# Patient Record
Sex: FEMALE | Race: WHITE | NOT HISPANIC OR LATINO | URBAN - METROPOLITAN AREA
[De-identification: names, ages, dates, MRNs, and addresses within clinical notes are randomized per-mention and may not be internally consistent; named-entity substitution may affect disease eponyms.]

---

## 2016-12-29 NOTE — ED PROVIDER NOTE - OBJECTIVE STATEMENT
89 y/o F with h/o chronic neck and back pain c/o same.  States she slept in a recliner last night and this exacerbated her chronic pain.  Denies injury/trauma.  Denies fever, bowel/bladder incontinence, extremity weakness , or any other complaints. 89 y/o F with h/o chronic neck and back pain c/o same.  States she slept in a recliner last night and this exacerbated her chronic pain.  Denies injury/trauma.  Denies fever, bowel/bladder incontinence, extremity weakness , or any other complaints.  Pt followed by pmd and pain mgmt for her chronic pain.  States this is her typical chronic neck and back pain.  No change.

## 2016-12-29 NOTE — ED PROVIDER NOTE - CARE PLAN
Principal Discharge DX:	Chronic back pain, unspecified back location, unspecified back pain laterality

## 2016-12-29 NOTE — ED ADULT NURSE NOTE - PMH
CAD (coronary artery disease)    Cataract  right eye  Chronic back pain    DVT (deep venous thrombosis), right    Hypothyroid

## 2016-12-29 NOTE — ED PROVIDER NOTE - CHPI ED SYMPTOMS NEG
no constipation/no bowel dysfunction/no motor function loss/no bladder dysfunction/no neck tenderness/no numbness/no tingling

## 2016-12-29 NOTE — ED PROVIDER NOTE - PROGRESS NOTE DETAILS
PT found to have rectal temp of 100.4.  PT denies fever/chills, SOB, cough, urinary symptoms, or any other infectious complaints.  Only c/o chronic neck and back pain. PT found to have rectal temp of 100.4.  PT denies fever/chills, SOB, cough, urinary symptoms, or any other infectious complaints.  Only c/o chronic neck and back pain.  CXR with chronic L  basilar effusion. Pt resting comfortably. No distress.  Feels better.  Family at bedside not sure if she can go home yet.  May need STR.   Will consult PT and SW. Seen and evaluated by PT. Not stable to go home. Will require 3 day in patient hospitalization. PMD=Dilip. Will contact  for admission Seen and evaluated by PT. Not stable to go home. Will require 3 day in patient hospitalization. PMD=Dilip. Will contact  for admission. Awaiting call back

## 2016-12-29 NOTE — ED ADULT NURSE NOTE - OBJECTIVE STATEMENT
pt states she has chronic pain and last night slept in the recliner and was unable to stand today. pt sees pain management  presents with complaints of pain to neck, and lower back. pt moaning and yells in pain with movement.

## 2016-12-30 NOTE — H&P ADULT. - PROBLEM SELECTOR PLAN 1
Will continue patient on Oxycodone for pain as needed.  Will get neurology to see patient.   Further management as per patient's clinical course

## 2016-12-30 NOTE — H&P ADULT. - PROBLEM SELECTOR PLAN 2
Will continue patient on amlodipine, Diovan and metoprolol with holding parameters.  Monitor blood pressure per protocol.

## 2016-12-30 NOTE — H&P ADULT. - PROBLEM SELECTOR PLAN 8
Source of leukocytosis and low-grade temperature not clear.  Will order one dose of Levaquin.  We will request infectious disease consultation.  Follow culture data.

## 2016-12-30 NOTE — PHYSICAL THERAPY INITIAL EVALUATION ADULT - GAIT DEVIATIONS NOTED, PT EVAL
decreased weight-shifting ability/decreased yuval/decreased velocity of limb motion/decreased stride length/decreased step length

## 2016-12-30 NOTE — H&P ADULT. - NEUROLOGICAL COMMENTS
Patient is awake and alert.  She is complaining of pain all over the body.  She denies any weakness in legs or arms.

## 2016-12-30 NOTE — PHYSICAL THERAPY INITIAL EVALUATION ADULT - RANGE OF MOTION EXAMINATION, REHAB EVAL
deficits as listed below/however limited cervical spine rotation, multiple arthritic changes and limitations to end range due to pain and musculoskeletal history./bilateral upper extremity ROM was WFL (within functional limits)/bilateral lower extremity ROM was WFL (within functional limits)

## 2016-12-30 NOTE — PHYSICAL THERAPY INITIAL EVALUATION ADULT - FOLLOWS COMMANDS/ANSWERS QUESTIONS, REHAB EVAL
very hesitant, anxious and not willing to allow PT to assist her. Pt was getting annoyed and stating "let me do it". Pt with confusion and poor safety awareness/75% of the time/able to follow single-step instructions

## 2016-12-30 NOTE — ED ADULT NURSE REASSESSMENT NOTE - COMFORT CARE
warm blanket provided/side rails up/plan of care explained/darkened lights/repositioned/wait time explained

## 2016-12-30 NOTE — H&P ADULT. - ASSESSMENT
89-year-old female with  history of CAD, stent placement, hypertension, dyslipidemia, hypothyroidism, history of cataracts and DVT in the past, who has been having increasing back pain and neck pain.  Patient was brought into the emergency room last night due to increasing back and neck pain and inability to walk.  She was seen by physical therapy today.  She is recommended subacute rehabilitation.  Patient is being admitted to the hospital for 3 nights today as per ER physician and physical therapy. Patient is being admitted for same. She is also noted to have low grade temp and mild leukocytosis. Chest x-ray is questionable for pneumonia.

## 2016-12-30 NOTE — PHYSICAL THERAPY INITIAL EVALUATION ADULT - ADDITIONAL COMMENTS
History obtained from pt but is noted to have confusion. As per pt she lives with her son in a private home (son works during daytime). Pt has 3 MARIO and handrails. Pt did not specify about stairs indoors. Pt reports she ambulates with a rolling walker, also owns a single axis cane. Pt does not drive. Pt reports being independent /c functional mobility and ADLs. Pt has a electric recliner in which she does not usually sleep in but slept in after seeing her doctor this week. Now came to ED due to back pain.

## 2016-12-30 NOTE — PATIENT PROFILE ADULT. - ABILITY TO HEAR (WITH HEARING AID OR HEARING APPLIANCE IF NORMALLY USED):
Mildly to Moderately Impaired: difficulty hearing in some environments or speaker may need to increase volume or speak distinctly

## 2016-12-30 NOTE — PHYSICAL THERAPY INITIAL EVALUATION ADULT - IMPAIRMENTS CONTRIBUTING TO GAIT DEVIATIONS, PT EVAL
cognition/decreased ROM/impaired balance/decreased flexibility/decreased strength/impaired coordination/pain/impaired postural control/narrow base of support

## 2016-12-30 NOTE — PHYSICAL THERAPY INITIAL EVALUATION ADULT - PERTINENT HX OF CURRENT PROBLEM, REHAB EVAL
As per ED note: "89 y/o F with h/o chronic neck and back pain c/o same.  States she slept in a recliner last night and this exacerbated her chronic pain.  Denies injury/trauma.  Denies fever, bowel/bladder incontinence, extremity weakness , or any other complaints.  Pt followed by pmd and pain mgmt for her chronic pain.  States this is her typical chronic neck and back pain.  No change". Pt has history of musculoskeletal issues of cervical spondylosis and chronic back pain.

## 2016-12-30 NOTE — PHYSICAL THERAPY INITIAL EVALUATION ADULT - ANKLE STRATEGY ASSESSMENT, REHAB EVAL
bpm when initially back into bed, resolved to ~98 bpm in < 10 seconds. NAD. Dr. Ascencio and CATRACHO Arzola aware.

## 2016-12-30 NOTE — PHYSICAL THERAPY INITIAL EVALUATION ADULT - PHYSICAL ASSIST/NONPHYSICAL ASSIST: SUPINE/SIT, REHAB EVAL
1 person assist/verbal cues/pt grabbing for bedrail instead of following PT cues for ease of transfers/nonverbal cues (demo/gestures)/set-up required

## 2016-12-30 NOTE — PHYSICAL THERAPY INITIAL EVALUATION ADULT - TRANSFER SAFETY CONCERNS NOTED: SIT/STAND, REHAB EVAL
decreased balance during turns/decreased weight-shifting ability/decreased sequencing ability/decreased safety awareness

## 2016-12-30 NOTE — PHYSICAL THERAPY INITIAL EVALUATION ADULT - PHYSICAL ASSIST/NONPHYSICAL ASSIST: SIT/SUPINE, REHAB EVAL
verbal cues/set-up required/nonverbal cues (demo/gestures)/pt grabbing for bedrail instead of following PT cues for ease of transfers/1 person assist

## 2016-12-30 NOTE — PHYSICAL THERAPY INITIAL EVALUATION ADULT - PLANNED THERAPY INTERVENTIONS, PT EVAL
balance training/Addendum: G-code information.  The patient's primary goal was to improve mobility and ambulation.  Based on the subjective reports and objective findings the primary functional issue is related to G code 8978 CK modifier. Because the patient will be followed in another setting, the goal and discharge status is as follows:  CK for goal,  CK for discharge. PT recc KUN needs./bed mobility training/transfer training/gait training

## 2016-12-30 NOTE — PHYSICAL THERAPY INITIAL EVALUATION ADULT - IMPAIRED TRANSFERS: SIT/STAND, REHAB EVAL
impaired balance/decreased flexibility/decreased ROM/impaired postural control/cognition/narrow base of support/pain/decreased strength/impaired coordination

## 2016-12-30 NOTE — H&P ADULT. - BACK COMMENTS
Patient complains of back pain on minimal palpation although no deformities are noted except for straightening of the spine.  Patient  is unable to sit up straight due to pain.  She also is unable to turn on her own.

## 2016-12-30 NOTE — PHYSICAL THERAPY INITIAL EVALUATION ADULT - GENERAL OBSERVATIONS, REHAB EVAL
Pt received in ED on stretcher /c BP signals beeping. BP re-taken 148/48. Pt reporting she feels cold and in pain to right heel initially. Pt noted to be confused as well. A&Ox2-3 but very anxious and sensitive to touch. NAD. CATRACHO Arzola and Dr. Ascencio aware. Pt hesitant and apprehension to get up and move but was agreeable /c PT eval.

## 2016-12-30 NOTE — H&P ADULT. - RS GEN PE MLT RESP DETAILS PC
no intercostal retractions/diminished breath sounds, L/no rales/airway patent/no chest wall tenderness/no subcutaneous emphysema/diminished breath sounds, R

## 2016-12-30 NOTE — PHYSICAL THERAPY INITIAL EVALUATION ADULT - IMPAIRMENTS FOUND, PT EVAL
gross motor/ROM/posture/pain, stairs/aerobic capacity/endurance/muscle strength/poor safety awareness/cognitive impairment/fine motor/gait, locomotion, and balance/arousal, attention, and cognition

## 2016-12-30 NOTE — H&P ADULT. - PMH
CAD (coronary artery disease)    Cataract  right eye  Chronic back pain    DVT (deep venous thrombosis), right    Essential hypertension    Hyperlipidemia, unspecified hyperlipidemia type    Hypothyroid

## 2016-12-30 NOTE — H&P ADULT. - HISTORY OF PRESENT ILLNESS
89-year-old female who has past medical history of CAD, stent placement, hypertension, dyslipidemia, hypothyroidism, history of cataracts and DVT in the past, who has been having increasing back pain and neck pain.  The patient follows with pain management physician on regular basis.      Patient apparently slept in a recliner day before.  She was unable to stand or walk next day due to pain.  The patient was brought into the emergency room last night.  She was seen by physical therapy today.  She is recommended subacute rehabilitation.  Patient is being admitted to the hospital for 3 nights today as per ER physician and physical therapy.    Patient was also noted to have low-grade fever in the emergency room 100.4.  Chest x-ray shows pleural effusion which are chronic.    At the time my evaluation patient denies any chest pain or chest pressure.  She continues complaining of neck pain and back pain.  Denies any nausea or vomiting denies any fever or chills.  Denies any dizziness or lightheadedness.  Denies any syncope.  The patient denies any bowel or bladder control issues.  She denies any neurological deficit other than inability to stand and walk at this time secondary to pain.

## 2016-12-30 NOTE — ED ADULT NURSE REASSESSMENT NOTE - NS ED NURSE REASSESS COMMENT FT1
patient c/o bladder pressure, bladder scan done, shows >678. Kumar cath placed with 675 mL residual urine drained with relief of pressure. Dr Latham aware.
patient resting during night comfortably in no distress, with no complaints. breathing easily, no pain, moving all extremities.
89 year old female received from Night RN. Pt presented to the ED with back pain. Pt at baseline mental status- confused A+O x 2. disoriented to time. Pt afib on the monitor. Pt denies chest pain. Respirations even and unlabored. Pt unwilling / unable to ambulate with assistance at this time. Pt c/o back bone pain . pain scale 8. Limited movement bilateral upper and lower extremities. Dumont in place to r/o urinary retention. 75 mL in dumont bag. Urine light yellow and clear. no abdominal or suprapubic pain/distention noted.

## 2017-01-02 NOTE — DISCHARGE NOTE ADULT - PROVIDER TOKENS
FREE:[LAST:[angela],FIRST:[tom],PHONE:[(   )    -],FAX:[(   )    -],ADDRESS:[[facility]]],TOKEN:'[id number]',TOKEN:'[id number]'

## 2017-01-02 NOTE — DISCHARGE NOTE ADULT - MEDICATION SUMMARY - MEDICATIONS TO STOP TAKING
I will STOP taking the medications listed below when I get home from the hospital:    hydrocodone-ibuprofen 7.5 mg-200 mg oral tablet  -- 1 tab(s) by mouth every 4 hours, As Needed

## 2017-01-02 NOTE — DISCHARGE NOTE ADULT - SECONDARY DIAGNOSIS.
CAD (coronary artery disease) Essential hypertension Hyperlipidemia, unspecified hyperlipidemia type Hypothyroid

## 2017-01-02 NOTE — DISCHARGE NOTE ADULT - MEDICATION SUMMARY - MEDICATIONS TO TAKE
I will START or STAY ON the medications listed below when I get home from the hospital:    Medrol Dosepak 4 mg oral tablet  -- 1 packet(s) by mouth once take as directed  -- Indication: For =    traMADol 50 mg oral tablet  -- 1 tab(s) by mouth 4 times a day, As needed, pain  -- Indication: For =    aspirin 81 mg oral tablet  -- 1 tab(s) by mouth once a day  -- Indication: For =    Diovan 80 mg oral tablet  -- 1 tab(s) by mouth once a day  -- Indication: For =    tamsulosin 0.4 mg oral capsule  -- 1 cap(s) by mouth once a day (at bedtime)  -- Indication: For =    heparin  -- 5000 unit(s) subcutaneous 3 times a day  -- Indication: For =    Zocor 40 mg oral tablet  -- 1 tab(s) by mouth once a day (at bedtime)  -- Indication: For =    Zetia 10 mg oral tablet  -- 1 tab(s) by mouth once a day  -- Indication: For =    QUEtiapine  -- 12.5 milligram(s) by mouth once a day (at bedtime)  -- Indication: For =    Xanax 0.25 mg oral tablet  -- 1 tab(s) by mouth 3 times a day, As Needed  -- Indication: For =    metoprolol tartrate 50 mg oral tablet  -- 1 tab(s) by mouth 2 times a day  -- Indication: For =    Fosamax  -- 35  by mouth once a day  -- Indication: For =    amLODIPine 10 mg oral tablet  -- 1 tab(s) by mouth once a day  -- Indication: For =    lidocaine 5% topical film  -- Apply on skin to affected area once a day  -- Indication: For =    docusate sodium 100 mg oral capsule  -- 1 cap(s) by mouth 3 times a day  -- Indication: For =    methocarbamol 750 mg oral tablet  -- 1 tab(s) by mouth 3 times a day  -- Indication: For =    pantoprazole 40 mg oral delayed release tablet  -- 1 tab(s) by mouth once a day (before a meal)  -- Indication: For =    Synthroid 75 mcg (0.075 mg) oral tablet  -- 1 tab(s) by mouth once a day  -- Indication: For = I will START or STAY ON the medications listed below when I get home from the hospital:    Medrol Dosepak 4 mg oral tablet  -- 1 packet(s) by mouth once take as directed  -- Indication: For =    traMADol 50 mg oral tablet  -- 1 tab(s) by mouth 4 times a day, As needed, pain  -- Indication: For =    aspirin 81 mg oral tablet  -- 1 tab(s) by mouth once a day  -- Indication: For =    Diovan 80 mg oral tablet  -- 1 tab(s) by mouth once a day  -- Indication: For =    tamsulosin 0.4 mg oral capsule  -- 1 cap(s) by mouth once a day (at bedtime)  -- Indication: For =    heparin  -- 5000 unit(s) subcutaneous 3 times a day  -- Indication: For =    Zocor 40 mg oral tablet  -- 1 tab(s) by mouth once a day (at bedtime)  -- Indication: For =    Zetia 10 mg oral tablet  -- 1 tab(s) by mouth once a day  -- Indication: For =    QUEtiapine  -- 12.5 milligram(s) by mouth once a day (at bedtime)  -- Indication: For =    Xanax 0.25 mg oral tablet  -- 1 tab(s) by mouth 3 times a day, As Needed  -- Indication: For =    metoprolol tartrate 50 mg oral tablet  -- 1 tab(s) by mouth 2 times a day  -- Indication: For =    Fosamax  -- 35  by mouth once a day  -- Indication: For =    amLODIPine 10 mg oral tablet  -- 1 tab(s) by mouth once a day  -- Indication: For =    lidocaine 5% topical film  -- Apply on skin to affected area once a day  -- Indication: For =    docusate sodium 100 mg oral capsule  -- 1 cap(s) by mouth 3 times a day  -- Indication: For =    methocarbamol 750 mg oral tablet  -- 1 tab(s) by mouth 3 times a day  -- Indication: For =    pantoprazole 40 mg oral delayed release tablet  -- 1 tab(s) by mouth once a day (before a meal)  -- Indication: For =    Synthroid 75 mcg (0.075 mg) oral tablet  -- 1 tab(s) by mouth once a day  -- Indication: For =    nitrofurantoin macrocrystals-monohydrate 100 mg oral capsule  -- 1 cap(s) by mouth 2 times a day (with meals) for 2 days  -- Indication: For =

## 2017-01-02 NOTE — DISCHARGE NOTE ADULT - CARE PROVIDER_API CALL
tom millerA  Phone: (   )    -  Fax: (   )    -    Nuno Marin (MD), Urology  [address]  [place] [address]  Phone: [phone]  Fax: [phone]    Perlman, Craig D (DO), Internal Medicine  [facility]  [place] [address]  Phone: [phone]  Fax: [phone]

## 2017-01-02 NOTE — DISCHARGE NOTE ADULT - HOSPITAL COURSE
admitted for FALL   had a febrile epiose  Doubt active infection  DC after ID and neuro clearance admitted for FALL   had a febrile epiose  Doubt active infection / UTI  DC after ID and neuro clearance

## 2017-01-02 NOTE — DISCHARGE NOTE ADULT - CARE PROVIDERS DIRECT ADDRESSES
,DirectAddress_Unknown,[email or web address],DirectAddress_Unknown,DirectAddress_Unknown

## 2017-01-02 NOTE — DISCHARGE NOTE ADULT - PATIENT PORTAL LINK FT
“You can access the FollowHealth Patient Portal, offered by [facility], by registering with the following website: [email or web address]”

## 2017-01-02 NOTE — DISCHARGE NOTE ADULT - CARE PLAN
Principal Discharge DX:	Chronic back pain, unspecified back location, unspecified back pain laterality  Goal:	free from pain  Instructions for follow-up, activity and diet:	follow up with PMD Dr. CURTIS  Secondary Diagnosis:	CAD (coronary artery disease)  Secondary Diagnosis:	Essential hypertension  Secondary Diagnosis:	Hyperlipidemia, unspecified hyperlipidemia type  Secondary Diagnosis:	Hypothyroid

## 2017-01-14 ENCOUNTER — INPATIENT (INPATIENT)
Facility: HOSPITAL | Age: 82
LOS: 3 days | Discharge: HOME | DRG: 195 | End: 2017-01-18
Attending: INTERNAL MEDICINE | Admitting: INTERNAL MEDICINE
Payer: MEDICARE

## 2017-01-14 VITALS
WEIGHT: 123.02 LBS | TEMPERATURE: 100 F | OXYGEN SATURATION: 97 % | SYSTOLIC BLOOD PRESSURE: 116 MMHG | HEART RATE: 84 BPM | RESPIRATION RATE: 14 BRPM | DIASTOLIC BLOOD PRESSURE: 60 MMHG

## 2017-01-14 DIAGNOSIS — A41.9 SEPSIS, UNSPECIFIED ORGANISM: ICD-10-CM

## 2017-01-14 DIAGNOSIS — Z95.0 PRESENCE OF CARDIAC PACEMAKER: Chronic | ICD-10-CM

## 2017-01-14 LAB
ALBUMIN SERPL ELPH-MCNC: 3 G/DL — LOW (ref 3.3–5)
ALBUMIN SERPL ELPH-MCNC: 3.3 G/DL — SIGNIFICANT CHANGE UP (ref 3.3–5)
ALP SERPL-CCNC: 81 U/L — SIGNIFICANT CHANGE UP (ref 40–120)
ALT FLD-CCNC: 37 U/L — SIGNIFICANT CHANGE UP (ref 12–78)
ANION GAP SERPL CALC-SCNC: 5 MMOL/L — SIGNIFICANT CHANGE UP (ref 5–17)
ANION GAP SERPL CALC-SCNC: 6 MMOL/L — SIGNIFICANT CHANGE UP (ref 5–17)
ANION GAP SERPL CALC-SCNC: 7 MMOL/L — SIGNIFICANT CHANGE UP (ref 5–17)
APPEARANCE UR: CLEAR — SIGNIFICANT CHANGE UP
AST SERPL-CCNC: 18 U/L — SIGNIFICANT CHANGE UP (ref 15–37)
AST SERPL-CCNC: 22 U/L — SIGNIFICANT CHANGE UP (ref 15–37)
BILIRUB SERPL-MCNC: 0.3 MG/DL — SIGNIFICANT CHANGE UP (ref 0.2–1.2)
BILIRUB UR-MCNC: ABNORMAL
BUN SERPL-MCNC: 35 MG/DL — HIGH (ref 7–23)
BUN SERPL-MCNC: 36 MG/DL — HIGH (ref 7–23)
BUN SERPL-MCNC: 37 MG/DL — HIGH (ref 7–23)
CALCIUM SERPL-MCNC: 7.1 MG/DL — LOW (ref 8.5–10.1)
CALCIUM SERPL-MCNC: 7.3 MG/DL — LOW (ref 8.5–10.1)
CALCIUM SERPL-MCNC: 8.3 MG/DL — LOW (ref 8.5–10.1)
CHLORIDE SERPL-SCNC: 107 MMOL/L — SIGNIFICANT CHANGE UP (ref 96–108)
CHLORIDE SERPL-SCNC: 110 MMOL/L — HIGH (ref 96–108)
CHLORIDE SERPL-SCNC: 110 MMOL/L — HIGH (ref 96–108)
CO2 SERPL-SCNC: 25 MMOL/L — SIGNIFICANT CHANGE UP (ref 22–31)
CO2 SERPL-SCNC: 25 MMOL/L — SIGNIFICANT CHANGE UP (ref 22–31)
CO2 SERPL-SCNC: 26 MMOL/L — SIGNIFICANT CHANGE UP (ref 22–31)
COLOR SPEC: YELLOW — SIGNIFICANT CHANGE UP
CREAT SERPL-MCNC: 1.1 MG/DL — SIGNIFICANT CHANGE UP (ref 0.5–1.3)
CREAT SERPL-MCNC: 1.1 MG/DL — SIGNIFICANT CHANGE UP (ref 0.5–1.3)
CREAT SERPL-MCNC: 1.3 MG/DL — SIGNIFICANT CHANGE UP (ref 0.5–1.3)
DIFF PNL FLD: ABNORMAL
EOSINOPHIL NFR BLD AUTO: 1 % — SIGNIFICANT CHANGE UP (ref 0–6)
GLUCOSE SERPL-MCNC: 126 MG/DL — HIGH (ref 70–99)
GLUCOSE SERPL-MCNC: 127 MG/DL — HIGH (ref 70–99)
GLUCOSE SERPL-MCNC: 154 MG/DL — HIGH (ref 70–99)
GLUCOSE UR QL: NEGATIVE — SIGNIFICANT CHANGE UP
HCT VFR BLD CALC: 36 % — SIGNIFICANT CHANGE UP (ref 34.5–45)
HCT VFR BLD CALC: 37.1 % — SIGNIFICANT CHANGE UP (ref 34.5–45)
HCT VFR BLD CALC: 39 % — SIGNIFICANT CHANGE UP (ref 34.5–45)
HGB BLD-MCNC: 11 G/DL — LOW (ref 11.5–15.5)
HGB BLD-MCNC: 11.5 G/DL — SIGNIFICANT CHANGE UP (ref 11.5–15.5)
HGB BLD-MCNC: 11.8 G/DL — SIGNIFICANT CHANGE UP (ref 11.5–15.5)
KETONES UR-MCNC: NEGATIVE — SIGNIFICANT CHANGE UP
LACTATE SERPL-SCNC: 1.2 MMOL/L — SIGNIFICANT CHANGE UP (ref 0.7–2)
LEUKOCYTE ESTERASE UR-ACNC: ABNORMAL
LYMPHOCYTES # BLD AUTO: 1 % — LOW (ref 13–44)
LYMPHOCYTES # BLD AUTO: 3 % — LOW (ref 13–44)
MCHC RBC-ENTMCNC: 29.7 PG — SIGNIFICANT CHANGE UP (ref 27–34)
MCHC RBC-ENTMCNC: 29.9 PG — SIGNIFICANT CHANGE UP (ref 27–34)
MCHC RBC-ENTMCNC: 30.2 GM/DL — LOW (ref 32–36)
MCHC RBC-ENTMCNC: 30.2 PG — SIGNIFICANT CHANGE UP (ref 27–34)
MCHC RBC-ENTMCNC: 30.4 GM/DL — LOW (ref 32–36)
MCHC RBC-ENTMCNC: 31 GM/DL — LOW (ref 32–36)
MCV RBC AUTO: 97.5 FL — SIGNIFICANT CHANGE UP (ref 80–100)
MCV RBC AUTO: 98.3 FL — SIGNIFICANT CHANGE UP (ref 80–100)
MCV RBC AUTO: 98.4 FL — SIGNIFICANT CHANGE UP (ref 80–100)
MONOCYTES NFR BLD AUTO: 2 % — SIGNIFICANT CHANGE UP (ref 1–9)
MONOCYTES NFR BLD AUTO: 7 % — SIGNIFICANT CHANGE UP (ref 1–9)
NEUTROPHILS NFR BLD AUTO: 87 % — HIGH (ref 43–77)
NEUTROPHILS NFR BLD AUTO: 91 % — HIGH (ref 43–77)
NITRITE UR-MCNC: NEGATIVE — SIGNIFICANT CHANGE UP
PH UR: 5 — SIGNIFICANT CHANGE UP (ref 4.8–8)
PLATELET # BLD AUTO: 221 K/UL — SIGNIFICANT CHANGE UP (ref 150–400)
PLATELET # BLD AUTO: 236 K/UL — SIGNIFICANT CHANGE UP (ref 150–400)
PLATELET # BLD AUTO: 267 K/UL — SIGNIFICANT CHANGE UP (ref 150–400)
POTASSIUM SERPL-MCNC: 5.3 MMOL/L — SIGNIFICANT CHANGE UP (ref 3.5–5.3)
POTASSIUM SERPL-MCNC: 5.6 MMOL/L — HIGH (ref 3.5–5.3)
POTASSIUM SERPL-MCNC: 6 MMOL/L — HIGH (ref 3.5–5.3)
POTASSIUM SERPL-SCNC: 5.3 MMOL/L — SIGNIFICANT CHANGE UP (ref 3.5–5.3)
POTASSIUM SERPL-SCNC: 5.6 MMOL/L — HIGH (ref 3.5–5.3)
POTASSIUM SERPL-SCNC: 6 MMOL/L — HIGH (ref 3.5–5.3)
PROT SERPL-MCNC: 7.8 G/DL — SIGNIFICANT CHANGE UP (ref 6–8.3)
PROT UR-MCNC: 25 MG/DL
RBC # BLD: 3.66 M/UL — LOW (ref 3.8–5.2)
RBC # BLD: 3.81 M/UL — SIGNIFICANT CHANGE UP (ref 3.8–5.2)
RBC # BLD: 3.96 M/UL — SIGNIFICANT CHANGE UP (ref 3.8–5.2)
RBC # FLD: 14.8 % — HIGH (ref 10.3–14.5)
RBC # FLD: 14.8 % — HIGH (ref 10.3–14.5)
RBC # FLD: 15 % — HIGH (ref 10.3–14.5)
SODIUM SERPL-SCNC: 140 MMOL/L — SIGNIFICANT CHANGE UP (ref 135–145)
SODIUM SERPL-SCNC: 140 MMOL/L — SIGNIFICANT CHANGE UP (ref 135–145)
SODIUM SERPL-SCNC: 141 MMOL/L — SIGNIFICANT CHANGE UP (ref 135–145)
SP GR SPEC: 1.01 — SIGNIFICANT CHANGE UP (ref 1.01–1.02)
UROBILINOGEN FLD QL: NEGATIVE — SIGNIFICANT CHANGE UP
WBC # BLD: 14.5 K/UL — HIGH (ref 3.8–10.5)
WBC # BLD: 14.9 K/UL — HIGH (ref 3.8–10.5)
WBC # BLD: 17.7 K/UL — HIGH (ref 3.8–10.5)
WBC # FLD AUTO: 14.5 K/UL — HIGH (ref 3.8–10.5)
WBC # FLD AUTO: 14.9 K/UL — HIGH (ref 3.8–10.5)
WBC # FLD AUTO: 17.7 K/UL — HIGH (ref 3.8–10.5)

## 2017-01-14 PROCEDURE — 71010: CPT | Mod: 26

## 2017-01-14 PROCEDURE — 99285 EMERGENCY DEPT VISIT HI MDM: CPT

## 2017-01-14 RX ORDER — SODIUM CHLORIDE 9 MG/ML
1000 INJECTION INTRAMUSCULAR; INTRAVENOUS; SUBCUTANEOUS ONCE
Qty: 0 | Refills: 0 | Status: COMPLETED | OUTPATIENT
Start: 2017-01-14 | End: 2017-01-14

## 2017-01-14 RX ORDER — QUETIAPINE FUMARATE 200 MG/1
12.5 TABLET, FILM COATED ORAL AT BEDTIME
Qty: 0 | Refills: 0 | Status: DISCONTINUED | OUTPATIENT
Start: 2017-01-14 | End: 2017-01-18

## 2017-01-14 RX ORDER — DOCUSATE SODIUM 100 MG
100 CAPSULE ORAL THREE TIMES A DAY
Qty: 0 | Refills: 0 | Status: DISCONTINUED | OUTPATIENT
Start: 2017-01-14 | End: 2017-01-18

## 2017-01-14 RX ORDER — HEPARIN SODIUM 5000 [USP'U]/ML
5000 INJECTION INTRAVENOUS; SUBCUTANEOUS EVERY 12 HOURS
Qty: 0 | Refills: 0 | Status: DISCONTINUED | OUTPATIENT
Start: 2017-01-14 | End: 2017-01-18

## 2017-01-14 RX ORDER — PANTOPRAZOLE SODIUM 20 MG/1
40 TABLET, DELAYED RELEASE ORAL
Qty: 0 | Refills: 0 | Status: DISCONTINUED | OUTPATIENT
Start: 2017-01-14 | End: 2017-01-18

## 2017-01-14 RX ORDER — LIDOCAINE 4 G/100G
1 CREAM TOPICAL DAILY
Qty: 0 | Refills: 0 | Status: DISCONTINUED | OUTPATIENT
Start: 2017-01-14 | End: 2017-01-18

## 2017-01-14 RX ORDER — TAMSULOSIN HYDROCHLORIDE 0.4 MG/1
0.4 CAPSULE ORAL AT BEDTIME
Qty: 0 | Refills: 0 | Status: DISCONTINUED | OUTPATIENT
Start: 2017-01-14 | End: 2017-01-18

## 2017-01-14 RX ORDER — ASPIRIN/CALCIUM CARB/MAGNESIUM 324 MG
81 TABLET ORAL DAILY
Qty: 0 | Refills: 0 | Status: DISCONTINUED | OUTPATIENT
Start: 2017-01-14 | End: 2017-01-18

## 2017-01-14 RX ORDER — LEVOTHYROXINE SODIUM 125 MCG
75 TABLET ORAL DAILY
Qty: 0 | Refills: 0 | Status: DISCONTINUED | OUTPATIENT
Start: 2017-01-14 | End: 2017-01-18

## 2017-01-14 RX ORDER — METOPROLOL TARTRATE 50 MG
50 TABLET ORAL
Qty: 0 | Refills: 0 | Status: DISCONTINUED | OUTPATIENT
Start: 2017-01-14 | End: 2017-01-17

## 2017-01-14 RX ORDER — AMLODIPINE BESYLATE 2.5 MG/1
10 TABLET ORAL DAILY
Qty: 0 | Refills: 0 | Status: DISCONTINUED | OUTPATIENT
Start: 2017-01-14 | End: 2017-01-18

## 2017-01-14 RX ORDER — SIMVASTATIN 20 MG/1
40 TABLET, FILM COATED ORAL AT BEDTIME
Qty: 0 | Refills: 0 | Status: DISCONTINUED | OUTPATIENT
Start: 2017-01-14 | End: 2017-01-18

## 2017-01-14 RX ORDER — ALPRAZOLAM 0.25 MG
0.25 TABLET ORAL THREE TIMES A DAY
Qty: 0 | Refills: 0 | Status: DISCONTINUED | OUTPATIENT
Start: 2017-01-14 | End: 2017-01-18

## 2017-01-14 RX ORDER — LACTOBACILLUS ACIDOPHILUS 100MM CELL
1 CAPSULE ORAL DAILY
Qty: 0 | Refills: 0 | Status: DISCONTINUED | OUTPATIENT
Start: 2017-01-14 | End: 2017-01-18

## 2017-01-14 RX ORDER — LOSARTAN POTASSIUM 100 MG/1
25 TABLET, FILM COATED ORAL DAILY
Qty: 0 | Refills: 0 | Status: DISCONTINUED | OUTPATIENT
Start: 2017-01-14 | End: 2017-01-15

## 2017-01-14 RX ORDER — TRAMADOL HYDROCHLORIDE 50 MG/1
50 TABLET ORAL
Qty: 0 | Refills: 0 | Status: DISCONTINUED | OUTPATIENT
Start: 2017-01-14 | End: 2017-01-18

## 2017-01-14 RX ORDER — ACETAMINOPHEN 500 MG
650 TABLET ORAL EVERY 6 HOURS
Qty: 0 | Refills: 0 | Status: DISCONTINUED | OUTPATIENT
Start: 2017-01-14 | End: 2017-01-18

## 2017-01-14 RX ORDER — CEFTRIAXONE 500 MG/1
1 INJECTION, POWDER, FOR SOLUTION INTRAMUSCULAR; INTRAVENOUS ONCE
Qty: 0 | Refills: 0 | Status: COMPLETED | OUTPATIENT
Start: 2017-01-14 | End: 2017-01-14

## 2017-01-14 RX ORDER — ACETAMINOPHEN 500 MG
650 TABLET ORAL ONCE
Qty: 0 | Refills: 0 | Status: COMPLETED | OUTPATIENT
Start: 2017-01-14 | End: 2017-01-14

## 2017-01-14 RX ORDER — VALSARTAN 80 MG/1
80 TABLET ORAL DAILY
Qty: 0 | Refills: 0 | Status: DISCONTINUED | OUTPATIENT
Start: 2017-01-14 | End: 2017-01-14

## 2017-01-14 RX ORDER — METHOCARBAMOL 500 MG/1
750 TABLET, FILM COATED ORAL THREE TIMES A DAY
Qty: 0 | Refills: 0 | Status: DISCONTINUED | OUTPATIENT
Start: 2017-01-14 | End: 2017-01-18

## 2017-01-14 RX ADMIN — CEFTRIAXONE 100 GRAM(S): 500 INJECTION, POWDER, FOR SOLUTION INTRAMUSCULAR; INTRAVENOUS at 19:52

## 2017-01-14 RX ADMIN — Medication 650 MILLIGRAM(S): at 18:30

## 2017-01-14 RX ADMIN — SODIUM CHLORIDE 1000 MILLILITER(S): 9 INJECTION INTRAMUSCULAR; INTRAVENOUS; SUBCUTANEOUS at 18:08

## 2017-01-14 RX ADMIN — SODIUM CHLORIDE 2000 MILLILITER(S): 9 INJECTION INTRAMUSCULAR; INTRAVENOUS; SUBCUTANEOUS at 19:57

## 2017-01-14 NOTE — ED PROVIDER NOTE - CONSTITUTIONAL, MLM
normal... Well appearing thin elderly female, well nourished, awake, alert, oriented to person, place, in no apparent distress.

## 2017-01-14 NOTE — ED PROVIDER NOTE - CHPI ED SYMPTOMS NEG
no chills/no decreased eating/drinking/no abdominal pain/no shortness of breath/no diarrhea/no cough/no vomiting

## 2017-01-14 NOTE — ED PROVIDER NOTE - OBJECTIVE STATEMENT
89 yo white female, home from rehab for one day, sent here from home for evaluation of possible fever after being visited by home care visiting nurse. Patient states feels well and has no complaints and son agrees with her

## 2017-01-14 NOTE — H&P ADULT. - PMH
CAD (coronary artery disease)    Cataract  right eye  Chronic back pain    DVT (deep venous thrombosis), right    Essential hypertension    Hyperlipidemia, unspecified hyperlipidemia type    Hypothyroid Agitation    Anxiety    CAD (coronary artery disease)    Cataract  right eye  Chronic back pain    DVT (deep venous thrombosis), right    Essential hypertension    GERD (gastroesophageal reflux disease)    Hyperlipidemia, unspecified hyperlipidemia type    Hypothyroid    Neck pain    Retention of urine

## 2017-01-15 DIAGNOSIS — D72.829 ELEVATED WHITE BLOOD CELL COUNT, UNSPECIFIED: ICD-10-CM

## 2017-01-15 DIAGNOSIS — R50.9 FEVER, UNSPECIFIED: ICD-10-CM

## 2017-01-15 DIAGNOSIS — E87.5 HYPERKALEMIA: ICD-10-CM

## 2017-01-15 LAB
ALP SERPL-CCNC: 69 U/L — SIGNIFICANT CHANGE UP (ref 40–120)
ALT FLD-CCNC: 17 U/L — SIGNIFICANT CHANGE UP (ref 12–78)
ANION GAP SERPL CALC-SCNC: 7 MMOL/L — SIGNIFICANT CHANGE UP (ref 5–17)
BILIRUB SERPL-MCNC: 0.2 MG/DL — SIGNIFICANT CHANGE UP (ref 0.2–1.2)
BUN SERPL-MCNC: 25 MG/DL — HIGH (ref 7–23)
CALCIUM SERPL-MCNC: 7 MG/DL — LOW (ref 8.5–10.1)
CHLORIDE SERPL-SCNC: 111 MMOL/L — HIGH (ref 96–108)
CO2 SERPL-SCNC: 24 MMOL/L — SIGNIFICANT CHANGE UP (ref 22–31)
CREAT SERPL-MCNC: 0.97 MG/DL — SIGNIFICANT CHANGE UP (ref 0.5–1.3)
GLUCOSE SERPL-MCNC: 86 MG/DL — SIGNIFICANT CHANGE UP (ref 70–99)
HBA1C BLD-MCNC: 6 % — HIGH (ref 4–5.6)
HCT VFR BLD CALC: 32.4 % — LOW (ref 34.5–45)
HGB BLD-MCNC: 10 G/DL — LOW (ref 11.5–15.5)
MCHC RBC-ENTMCNC: 29.8 PG — SIGNIFICANT CHANGE UP (ref 27–34)
MCHC RBC-ENTMCNC: 30.8 GM/DL — LOW (ref 32–36)
MCV RBC AUTO: 96.8 FL — SIGNIFICANT CHANGE UP (ref 80–100)
PLATELET # BLD AUTO: 201 K/UL — SIGNIFICANT CHANGE UP (ref 150–400)
POTASSIUM SERPL-MCNC: 3.7 MMOL/L — SIGNIFICANT CHANGE UP (ref 3.5–5.3)
POTASSIUM SERPL-SCNC: 3.7 MMOL/L — SIGNIFICANT CHANGE UP (ref 3.5–5.3)
PROT SERPL-MCNC: 6.4 G/DL — SIGNIFICANT CHANGE UP (ref 6–8.3)
RAPID RVP RESULT: SIGNIFICANT CHANGE UP
RBC # BLD: 3.34 M/UL — LOW (ref 3.8–5.2)
RBC # FLD: 14.6 % — HIGH (ref 10.3–14.5)
SODIUM SERPL-SCNC: 142 MMOL/L — SIGNIFICANT CHANGE UP (ref 135–145)
WBC # BLD: 11.2 K/UL — HIGH (ref 3.8–10.5)
WBC # FLD AUTO: 11.2 K/UL — HIGH (ref 3.8–10.5)

## 2017-01-15 RX ORDER — SODIUM POLYSTYRENE SULFONATE 4.1 MEQ/G
30 POWDER, FOR SUSPENSION ORAL ONCE
Qty: 0 | Refills: 0 | Status: COMPLETED | OUTPATIENT
Start: 2017-01-15 | End: 2017-01-15

## 2017-01-15 RX ORDER — SODIUM POLYSTYRENE SULFONATE 4.1 MEQ/G
15 POWDER, FOR SUSPENSION ORAL ONCE
Qty: 0 | Refills: 0 | Status: COMPLETED | OUTPATIENT
Start: 2017-01-15 | End: 2017-01-15

## 2017-01-15 RX ADMIN — Medication 100 MILLIGRAM(S): at 14:54

## 2017-01-15 RX ADMIN — QUETIAPINE FUMARATE 12.5 MILLIGRAM(S): 200 TABLET, FILM COATED ORAL at 23:25

## 2017-01-15 RX ADMIN — Medication 100 MILLIGRAM(S): at 05:07

## 2017-01-15 RX ADMIN — METHOCARBAMOL 750 MILLIGRAM(S): 500 TABLET, FILM COATED ORAL at 14:54

## 2017-01-15 RX ADMIN — AMLODIPINE BESYLATE 10 MILLIGRAM(S): 2.5 TABLET ORAL at 05:06

## 2017-01-15 RX ADMIN — Medication 50 MILLIGRAM(S): at 17:25

## 2017-01-15 RX ADMIN — METHOCARBAMOL 750 MILLIGRAM(S): 500 TABLET, FILM COATED ORAL at 06:49

## 2017-01-15 RX ADMIN — SODIUM POLYSTYRENE SULFONATE 15 GRAM(S): 4.1 POWDER, FOR SUSPENSION ORAL at 05:02

## 2017-01-15 RX ADMIN — LIDOCAINE 1 PATCH: 4 CREAM TOPICAL at 11:36

## 2017-01-15 RX ADMIN — Medication 75 MICROGRAM(S): at 05:06

## 2017-01-15 RX ADMIN — Medication 100 MILLIGRAM(S): at 11:37

## 2017-01-15 RX ADMIN — TAMSULOSIN HYDROCHLORIDE 0.4 MILLIGRAM(S): 0.4 CAPSULE ORAL at 21:56

## 2017-01-15 RX ADMIN — TRAMADOL HYDROCHLORIDE 50 MILLIGRAM(S): 50 TABLET ORAL at 23:00

## 2017-01-15 RX ADMIN — LOSARTAN POTASSIUM 25 MILLIGRAM(S): 100 TABLET, FILM COATED ORAL at 05:06

## 2017-01-15 RX ADMIN — SODIUM POLYSTYRENE SULFONATE 15 GRAM(S): 4.1 POWDER, FOR SUSPENSION ORAL at 11:37

## 2017-01-15 RX ADMIN — Medication 81 MILLIGRAM(S): at 11:36

## 2017-01-15 RX ADMIN — Medication 50 MILLIGRAM(S): at 05:06

## 2017-01-15 RX ADMIN — HEPARIN SODIUM 5000 UNIT(S): 5000 INJECTION INTRAVENOUS; SUBCUTANEOUS at 05:07

## 2017-01-15 RX ADMIN — TRAMADOL HYDROCHLORIDE 50 MILLIGRAM(S): 50 TABLET ORAL at 21:57

## 2017-01-15 RX ADMIN — SIMVASTATIN 40 MILLIGRAM(S): 20 TABLET, FILM COATED ORAL at 21:57

## 2017-01-15 RX ADMIN — Medication 1 TABLET(S): at 11:36

## 2017-01-15 RX ADMIN — PANTOPRAZOLE SODIUM 40 MILLIGRAM(S): 20 TABLET, DELAYED RELEASE ORAL at 05:06

## 2017-01-15 RX ADMIN — LIDOCAINE 1 PATCH: 4 CREAM TOPICAL at 23:26

## 2017-01-15 RX ADMIN — METHOCARBAMOL 750 MILLIGRAM(S): 500 TABLET, FILM COATED ORAL at 21:56

## 2017-01-15 RX ADMIN — HEPARIN SODIUM 5000 UNIT(S): 5000 INJECTION INTRAVENOUS; SUBCUTANEOUS at 17:25

## 2017-01-15 NOTE — PATIENT PROFILE ADULT. - VISION (WITH CORRECTIVE LENSES IF THE PATIENT USUALLY WEARS THEM):
use eyeglasses for reading/Partially impaired: cannot see medication labels or newsprint, but can see obstacles in path, and the surrounding layout; can count fingers at arm's length

## 2017-01-15 NOTE — PHYSICAL THERAPY INITIAL EVALUATION ADULT - PERTINENT HX OF CURRENT PROBLEM, REHAB EVAL
presented to er after patient felt feverish and had cough after exposed to cold air at rehab.  Dc from rehab couple of days ago.  Associated with chills and occasional diahporesis.

## 2017-01-16 LAB
ANION GAP SERPL CALC-SCNC: 6 MMOL/L — SIGNIFICANT CHANGE UP (ref 5–17)
BASOPHILS # BLD AUTO: 0 K/UL — SIGNIFICANT CHANGE UP (ref 0–0.2)
BASOPHILS NFR BLD AUTO: 0.5 % — SIGNIFICANT CHANGE UP (ref 0–2)
BUN SERPL-MCNC: 21 MG/DL — SIGNIFICANT CHANGE UP (ref 7–23)
CALCIUM SERPL-MCNC: 7.3 MG/DL — LOW (ref 8.5–10.1)
CHLORIDE SERPL-SCNC: 109 MMOL/L — HIGH (ref 96–108)
CO2 SERPL-SCNC: 27 MMOL/L — SIGNIFICANT CHANGE UP (ref 22–31)
CREAT SERPL-MCNC: 0.93 MG/DL — SIGNIFICANT CHANGE UP (ref 0.5–1.3)
EOSINOPHIL # BLD AUTO: 0.1 K/UL — SIGNIFICANT CHANGE UP (ref 0–0.5)
EOSINOPHIL NFR BLD AUTO: 1.6 % — SIGNIFICANT CHANGE UP (ref 0–6)
FERRITIN SERPL-MCNC: 231.2 NG/ML — HIGH (ref 15–150)
GLUCOSE SERPL-MCNC: 70 MG/DL — SIGNIFICANT CHANGE UP (ref 70–99)
HCT VFR BLD CALC: 31.1 % — LOW (ref 34.5–45)
HGB BLD-MCNC: 9.7 G/DL — LOW (ref 11.5–15.5)
IRON SATN MFR SERPL: 14 UG/DL — LOW (ref 30–160)
IRON SATN MFR SERPL: 9 % — LOW (ref 14–50)
LYMPHOCYTES # BLD AUTO: 0.7 K/UL — LOW (ref 1–3.3)
LYMPHOCYTES # BLD AUTO: 10 % — LOW (ref 13–44)
MAGNESIUM SERPL-MCNC: 1.8 MG/DL — SIGNIFICANT CHANGE UP (ref 1.8–2.4)
MCHC RBC-ENTMCNC: 30.1 PG — SIGNIFICANT CHANGE UP (ref 27–34)
MCHC RBC-ENTMCNC: 31.1 GM/DL — LOW (ref 32–36)
MCV RBC AUTO: 96.9 FL — SIGNIFICANT CHANGE UP (ref 80–100)
MONOCYTES # BLD AUTO: 0.5 K/UL — SIGNIFICANT CHANGE UP (ref 0–0.9)
MONOCYTES NFR BLD AUTO: 7 % — SIGNIFICANT CHANGE UP (ref 1–9)
NEUTROPHILS # BLD AUTO: 6 K/UL — SIGNIFICANT CHANGE UP (ref 1.8–7.4)
NEUTROPHILS NFR BLD AUTO: 80.9 % — HIGH (ref 43–77)
PLATELET # BLD AUTO: 153 K/UL — SIGNIFICANT CHANGE UP (ref 150–400)
POTASSIUM SERPL-MCNC: 3.9 MMOL/L — SIGNIFICANT CHANGE UP (ref 3.5–5.3)
POTASSIUM SERPL-SCNC: 3.9 MMOL/L — SIGNIFICANT CHANGE UP (ref 3.5–5.3)
RBC # BLD: 3.21 M/UL — LOW (ref 3.8–5.2)
RBC # BLD: 3.22 M/UL — LOW (ref 3.8–5.2)
RBC # FLD: 14.6 % — HIGH (ref 10.3–14.5)
RETICS #: 46.7 K/UL — SIGNIFICANT CHANGE UP (ref 25–125)
RETICS/RBC NFR: 1.5 % — SIGNIFICANT CHANGE UP (ref 0.5–2.5)
SODIUM SERPL-SCNC: 142 MMOL/L — SIGNIFICANT CHANGE UP (ref 135–145)
TIBC SERPL-MCNC: 149 UG/DL — LOW (ref 220–430)
UIBC SERPL-MCNC: 135 UG/DL — SIGNIFICANT CHANGE UP (ref 110–370)
VIT B12 SERPL-MCNC: 395 PG/ML — SIGNIFICANT CHANGE UP (ref 243–894)
WBC # BLD: 7.5 K/UL — SIGNIFICANT CHANGE UP (ref 3.8–10.5)
WBC # FLD AUTO: 7.5 K/UL — SIGNIFICANT CHANGE UP (ref 3.8–10.5)

## 2017-01-16 RX ORDER — PREGABALIN 225 MG/1
1000 CAPSULE ORAL DAILY
Qty: 0 | Refills: 0 | Status: COMPLETED | OUTPATIENT
Start: 2017-01-16 | End: 2017-01-17

## 2017-01-16 RX ORDER — IRON SUCROSE 20 MG/ML
100 INJECTION, SOLUTION INTRAVENOUS DAILY
Qty: 0 | Refills: 0 | Status: DISCONTINUED | OUTPATIENT
Start: 2017-01-16 | End: 2017-01-16

## 2017-01-16 RX ORDER — IRON SUCROSE 20 MG/ML
100 INJECTION, SOLUTION INTRAVENOUS DAILY
Qty: 0 | Refills: 0 | Status: COMPLETED | OUTPATIENT
Start: 2017-01-16 | End: 2017-01-18

## 2017-01-16 RX ADMIN — AMLODIPINE BESYLATE 10 MILLIGRAM(S): 2.5 TABLET ORAL at 05:52

## 2017-01-16 RX ADMIN — METHOCARBAMOL 750 MILLIGRAM(S): 500 TABLET, FILM COATED ORAL at 13:26

## 2017-01-16 RX ADMIN — HEPARIN SODIUM 5000 UNIT(S): 5000 INJECTION INTRAVENOUS; SUBCUTANEOUS at 05:52

## 2017-01-16 RX ADMIN — TRAMADOL HYDROCHLORIDE 50 MILLIGRAM(S): 50 TABLET ORAL at 09:30

## 2017-01-16 RX ADMIN — Medication 50 MILLIGRAM(S): at 05:52

## 2017-01-16 RX ADMIN — Medication 81 MILLIGRAM(S): at 12:27

## 2017-01-16 RX ADMIN — TRAMADOL HYDROCHLORIDE 50 MILLIGRAM(S): 50 TABLET ORAL at 23:56

## 2017-01-16 RX ADMIN — QUETIAPINE FUMARATE 12.5 MILLIGRAM(S): 200 TABLET, FILM COATED ORAL at 21:55

## 2017-01-16 RX ADMIN — TRAMADOL HYDROCHLORIDE 50 MILLIGRAM(S): 50 TABLET ORAL at 23:14

## 2017-01-16 RX ADMIN — TAMSULOSIN HYDROCHLORIDE 0.4 MILLIGRAM(S): 0.4 CAPSULE ORAL at 21:55

## 2017-01-16 RX ADMIN — Medication 100 MILLIGRAM(S): at 21:55

## 2017-01-16 RX ADMIN — SIMVASTATIN 40 MILLIGRAM(S): 20 TABLET, FILM COATED ORAL at 21:55

## 2017-01-16 RX ADMIN — IRON SUCROSE 100 MILLIGRAM(S): 20 INJECTION, SOLUTION INTRAVENOUS at 23:14

## 2017-01-16 RX ADMIN — METHOCARBAMOL 750 MILLIGRAM(S): 500 TABLET, FILM COATED ORAL at 21:55

## 2017-01-16 RX ADMIN — LIDOCAINE 1 PATCH: 4 CREAM TOPICAL at 12:27

## 2017-01-16 RX ADMIN — METHOCARBAMOL 750 MILLIGRAM(S): 500 TABLET, FILM COATED ORAL at 06:07

## 2017-01-16 RX ADMIN — TRAMADOL HYDROCHLORIDE 50 MILLIGRAM(S): 50 TABLET ORAL at 08:54

## 2017-01-16 RX ADMIN — PREGABALIN 1000 MICROGRAM(S): 225 CAPSULE ORAL at 22:28

## 2017-01-16 RX ADMIN — PANTOPRAZOLE SODIUM 40 MILLIGRAM(S): 20 TABLET, DELAYED RELEASE ORAL at 06:09

## 2017-01-16 RX ADMIN — Medication 75 MICROGRAM(S): at 05:55

## 2017-01-16 RX ADMIN — Medication 1 TABLET(S): at 12:27

## 2017-01-16 RX ADMIN — Medication 50 MILLIGRAM(S): at 17:44

## 2017-01-16 RX ADMIN — Medication 100 MILLIGRAM(S): at 15:50

## 2017-01-17 LAB
ANION GAP SERPL CALC-SCNC: 6 MMOL/L — SIGNIFICANT CHANGE UP (ref 5–17)
BUN SERPL-MCNC: 18 MG/DL — SIGNIFICANT CHANGE UP (ref 7–23)
CALCIUM SERPL-MCNC: 7.4 MG/DL — LOW (ref 8.5–10.1)
CHLORIDE SERPL-SCNC: 110 MMOL/L — HIGH (ref 96–108)
CO2 SERPL-SCNC: 25 MMOL/L — SIGNIFICANT CHANGE UP (ref 22–31)
CREAT SERPL-MCNC: 0.98 MG/DL — SIGNIFICANT CHANGE UP (ref 0.5–1.3)
GLUCOSE SERPL-MCNC: 84 MG/DL — SIGNIFICANT CHANGE UP (ref 70–99)
HCT VFR BLD CALC: 30.9 % — LOW (ref 34.5–45)
HGB BLD-MCNC: 9.6 G/DL — LOW (ref 11.5–15.5)
MCHC RBC-ENTMCNC: 29.3 PG — SIGNIFICANT CHANGE UP (ref 27–34)
MCHC RBC-ENTMCNC: 31.1 GM/DL — LOW (ref 32–36)
MCV RBC AUTO: 94.2 FL — SIGNIFICANT CHANGE UP (ref 80–100)
PLATELET # BLD AUTO: 149 K/UL — LOW (ref 150–400)
POTASSIUM SERPL-MCNC: 3.9 MMOL/L — SIGNIFICANT CHANGE UP (ref 3.5–5.3)
POTASSIUM SERPL-SCNC: 3.9 MMOL/L — SIGNIFICANT CHANGE UP (ref 3.5–5.3)
RBC # BLD: 3.28 M/UL — LOW (ref 3.8–5.2)
RBC # FLD: 14.5 % — SIGNIFICANT CHANGE UP (ref 10.3–14.5)
SODIUM SERPL-SCNC: 141 MMOL/L — SIGNIFICANT CHANGE UP (ref 135–145)
WBC # BLD: 8 K/UL — SIGNIFICANT CHANGE UP (ref 3.8–10.5)
WBC # FLD AUTO: 8 K/UL — SIGNIFICANT CHANGE UP (ref 3.8–10.5)

## 2017-01-17 RX ORDER — METOPROLOL TARTRATE 50 MG
50 TABLET ORAL
Qty: 0 | Refills: 0 | Status: DISCONTINUED | OUTPATIENT
Start: 2017-01-17 | End: 2017-01-18

## 2017-01-17 RX ADMIN — QUETIAPINE FUMARATE 12.5 MILLIGRAM(S): 200 TABLET, FILM COATED ORAL at 22:24

## 2017-01-17 RX ADMIN — Medication 75 MICROGRAM(S): at 06:00

## 2017-01-17 RX ADMIN — IRON SUCROSE 100 MILLIGRAM(S): 20 INJECTION, SOLUTION INTRAVENOUS at 12:15

## 2017-01-17 RX ADMIN — Medication 100 MILLIGRAM(S): at 22:24

## 2017-01-17 RX ADMIN — PREGABALIN 1000 MICROGRAM(S): 225 CAPSULE ORAL at 12:15

## 2017-01-17 RX ADMIN — Medication 50 MILLIGRAM(S): at 18:46

## 2017-01-17 RX ADMIN — Medication 1 TABLET(S): at 12:16

## 2017-01-17 RX ADMIN — PANTOPRAZOLE SODIUM 40 MILLIGRAM(S): 20 TABLET, DELAYED RELEASE ORAL at 06:03

## 2017-01-17 RX ADMIN — LIDOCAINE 1 PATCH: 4 CREAM TOPICAL at 15:24

## 2017-01-17 RX ADMIN — Medication 81 MILLIGRAM(S): at 12:16

## 2017-01-17 RX ADMIN — Medication 100 MILLIGRAM(S): at 15:47

## 2017-01-17 RX ADMIN — METHOCARBAMOL 750 MILLIGRAM(S): 500 TABLET, FILM COATED ORAL at 15:47

## 2017-01-17 RX ADMIN — METHOCARBAMOL 750 MILLIGRAM(S): 500 TABLET, FILM COATED ORAL at 22:24

## 2017-01-17 RX ADMIN — METHOCARBAMOL 750 MILLIGRAM(S): 500 TABLET, FILM COATED ORAL at 05:59

## 2017-01-17 RX ADMIN — LIDOCAINE 1 PATCH: 4 CREAM TOPICAL at 00:47

## 2017-01-17 RX ADMIN — SIMVASTATIN 40 MILLIGRAM(S): 20 TABLET, FILM COATED ORAL at 22:24

## 2017-01-17 RX ADMIN — TAMSULOSIN HYDROCHLORIDE 0.4 MILLIGRAM(S): 0.4 CAPSULE ORAL at 22:24

## 2017-01-17 RX ADMIN — HEPARIN SODIUM 5000 UNIT(S): 5000 INJECTION INTRAVENOUS; SUBCUTANEOUS at 18:46

## 2017-01-18 VITALS
HEART RATE: 65 BPM | TEMPERATURE: 98 F | OXYGEN SATURATION: 98 % | RESPIRATION RATE: 14 BRPM | DIASTOLIC BLOOD PRESSURE: 59 MMHG | SYSTOLIC BLOOD PRESSURE: 100 MMHG

## 2017-01-18 LAB
ANION GAP SERPL CALC-SCNC: 7 MMOL/L — SIGNIFICANT CHANGE UP (ref 5–17)
BASOPHILS # BLD AUTO: 0.1 K/UL — SIGNIFICANT CHANGE UP (ref 0–0.2)
BASOPHILS NFR BLD AUTO: 0.7 % — SIGNIFICANT CHANGE UP (ref 0–2)
BUN SERPL-MCNC: 13 MG/DL — SIGNIFICANT CHANGE UP (ref 7–23)
CALCIUM SERPL-MCNC: 8 MG/DL — LOW (ref 8.5–10.1)
CHLORIDE SERPL-SCNC: 109 MMOL/L — HIGH (ref 96–108)
CO2 SERPL-SCNC: 27 MMOL/L — SIGNIFICANT CHANGE UP (ref 22–31)
CREAT SERPL-MCNC: 0.88 MG/DL — SIGNIFICANT CHANGE UP (ref 0.5–1.3)
EOSINOPHIL # BLD AUTO: 0.2 K/UL — SIGNIFICANT CHANGE UP (ref 0–0.5)
EOSINOPHIL NFR BLD AUTO: 2.4 % — SIGNIFICANT CHANGE UP (ref 0–6)
GLUCOSE SERPL-MCNC: 85 MG/DL — SIGNIFICANT CHANGE UP (ref 70–99)
HCT VFR BLD CALC: 31.2 % — LOW (ref 34.5–45)
HGB BLD-MCNC: 9.6 G/DL — LOW (ref 11.5–15.5)
LYMPHOCYTES # BLD AUTO: 1.2 K/UL — SIGNIFICANT CHANGE UP (ref 1–3.3)
LYMPHOCYTES # BLD AUTO: 15 % — SIGNIFICANT CHANGE UP (ref 13–44)
MCHC RBC-ENTMCNC: 29.6 PG — SIGNIFICANT CHANGE UP (ref 27–34)
MCHC RBC-ENTMCNC: 30.8 GM/DL — LOW (ref 32–36)
MCV RBC AUTO: 95.9 FL — SIGNIFICANT CHANGE UP (ref 80–100)
MONOCYTES # BLD AUTO: 0.5 K/UL — SIGNIFICANT CHANGE UP (ref 0–0.9)
MONOCYTES NFR BLD AUTO: 5.8 % — SIGNIFICANT CHANGE UP (ref 1–9)
NEUTROPHILS # BLD AUTO: 6 K/UL — SIGNIFICANT CHANGE UP (ref 1.8–7.4)
NEUTROPHILS NFR BLD AUTO: 76.2 % — SIGNIFICANT CHANGE UP (ref 43–77)
PLATELET # BLD AUTO: 158 K/UL — SIGNIFICANT CHANGE UP (ref 150–400)
POTASSIUM SERPL-MCNC: 4.2 MMOL/L — SIGNIFICANT CHANGE UP (ref 3.5–5.3)
POTASSIUM SERPL-SCNC: 4.2 MMOL/L — SIGNIFICANT CHANGE UP (ref 3.5–5.3)
RBC # BLD: 3.25 M/UL — LOW (ref 3.8–5.2)
RBC # FLD: 14.1 % — SIGNIFICANT CHANGE UP (ref 10.3–14.5)
SODIUM SERPL-SCNC: 143 MMOL/L — SIGNIFICANT CHANGE UP (ref 135–145)
WBC # BLD: 7.9 K/UL — SIGNIFICANT CHANGE UP (ref 3.8–10.5)
WBC # FLD AUTO: 7.9 K/UL — SIGNIFICANT CHANGE UP (ref 3.8–10.5)

## 2017-01-18 PROCEDURE — 97530 THERAPEUTIC ACTIVITIES: CPT

## 2017-01-18 PROCEDURE — 85045 AUTOMATED RETICULOCYTE COUNT: CPT

## 2017-01-18 PROCEDURE — 87798 DETECT AGENT NOS DNA AMP: CPT

## 2017-01-18 PROCEDURE — 82607 VITAMIN B-12: CPT

## 2017-01-18 PROCEDURE — 96374 THER/PROPH/DIAG INJ IV PUSH: CPT

## 2017-01-18 PROCEDURE — 80048 BASIC METABOLIC PNL TOTAL CA: CPT

## 2017-01-18 PROCEDURE — 81001 URINALYSIS AUTO W/SCOPE: CPT

## 2017-01-18 PROCEDURE — 71045 X-RAY EXAM CHEST 1 VIEW: CPT

## 2017-01-18 PROCEDURE — 87086 URINE CULTURE/COLONY COUNT: CPT

## 2017-01-18 PROCEDURE — 99285 EMERGENCY DEPT VISIT HI MDM: CPT | Mod: 25

## 2017-01-18 PROCEDURE — 87040 BLOOD CULTURE FOR BACTERIA: CPT

## 2017-01-18 PROCEDURE — 87633 RESP VIRUS 12-25 TARGETS: CPT

## 2017-01-18 PROCEDURE — 83550 IRON BINDING TEST: CPT

## 2017-01-18 PROCEDURE — 97116 GAIT TRAINING THERAPY: CPT

## 2017-01-18 PROCEDURE — 87486 CHLMYD PNEUM DNA AMP PROBE: CPT

## 2017-01-18 PROCEDURE — 83735 ASSAY OF MAGNESIUM: CPT

## 2017-01-18 PROCEDURE — 97161 PT EVAL LOW COMPLEX 20 MIN: CPT

## 2017-01-18 PROCEDURE — 96372 THER/PROPH/DIAG INJ SC/IM: CPT | Mod: 59

## 2017-01-18 PROCEDURE — 96375 TX/PRO/DX INJ NEW DRUG ADDON: CPT

## 2017-01-18 PROCEDURE — 82728 ASSAY OF FERRITIN: CPT

## 2017-01-18 PROCEDURE — 80053 COMPREHEN METABOLIC PANEL: CPT

## 2017-01-18 PROCEDURE — 83036 HEMOGLOBIN GLYCOSYLATED A1C: CPT

## 2017-01-18 PROCEDURE — 83605 ASSAY OF LACTIC ACID: CPT

## 2017-01-18 PROCEDURE — 85027 COMPLETE CBC AUTOMATED: CPT

## 2017-01-18 PROCEDURE — 87581 M.PNEUMON DNA AMP PROBE: CPT

## 2017-01-18 PROCEDURE — 84145 PROCALCITONIN (PCT): CPT

## 2017-01-18 RX ORDER — LACTOBACILLUS ACIDOPHILUS 100MM CELL
1 CAPSULE ORAL
Qty: 0 | Refills: 0 | COMMUNITY
Start: 2017-01-18 | End: 2017-02-01

## 2017-01-18 RX ADMIN — Medication 100 MILLIGRAM(S): at 13:54

## 2017-01-18 RX ADMIN — PANTOPRAZOLE SODIUM 40 MILLIGRAM(S): 20 TABLET, DELAYED RELEASE ORAL at 06:15

## 2017-01-18 RX ADMIN — AMLODIPINE BESYLATE 10 MILLIGRAM(S): 2.5 TABLET ORAL at 06:15

## 2017-01-18 RX ADMIN — Medication 81 MILLIGRAM(S): at 12:06

## 2017-01-18 RX ADMIN — LIDOCAINE 1 PATCH: 4 CREAM TOPICAL at 12:06

## 2017-01-18 RX ADMIN — Medication 75 MICROGRAM(S): at 06:15

## 2017-01-18 RX ADMIN — TRAMADOL HYDROCHLORIDE 50 MILLIGRAM(S): 50 TABLET ORAL at 10:53

## 2017-01-18 RX ADMIN — TRAMADOL HYDROCHLORIDE 50 MILLIGRAM(S): 50 TABLET ORAL at 11:59

## 2017-01-18 RX ADMIN — METHOCARBAMOL 750 MILLIGRAM(S): 500 TABLET, FILM COATED ORAL at 06:15

## 2017-01-18 RX ADMIN — Medication 100 MILLIGRAM(S): at 06:15

## 2017-01-18 RX ADMIN — TRAMADOL HYDROCHLORIDE 50 MILLIGRAM(S): 50 TABLET ORAL at 03:18

## 2017-01-18 RX ADMIN — Medication 50 MILLIGRAM(S): at 06:15

## 2017-01-18 RX ADMIN — METHOCARBAMOL 750 MILLIGRAM(S): 500 TABLET, FILM COATED ORAL at 13:56

## 2017-01-18 RX ADMIN — TRAMADOL HYDROCHLORIDE 50 MILLIGRAM(S): 50 TABLET ORAL at 04:15

## 2017-01-18 RX ADMIN — HEPARIN SODIUM 5000 UNIT(S): 5000 INJECTION INTRAVENOUS; SUBCUTANEOUS at 06:16

## 2017-01-18 RX ADMIN — IRON SUCROSE 100 MILLIGRAM(S): 20 INJECTION, SOLUTION INTRAVENOUS at 12:07

## 2017-01-18 RX ADMIN — Medication 1 TABLET(S): at 12:05

## 2017-01-18 NOTE — DISCHARGE NOTE ADULT - CARE PLAN
Principal Discharge DX:	Pneumonia due to infectious organism, unspecified laterality, unspecified part of lung  Goal:	IV antibiotic  Instructions for follow-up, activity and diet:	IV fluid  Secondary Diagnosis:	Hyperlipidemia, unspecified hyperlipidemia type  Secondary Diagnosis:	Neck pain  Secondary Diagnosis:	Retention of urine  Secondary Diagnosis:	Fever, unspecified fever cause  Secondary Diagnosis:	Essential hypertension  Secondary Diagnosis:	Chronic midline low back pain without sciatica

## 2017-01-18 NOTE — DISCHARGE NOTE ADULT - PATIENT PORTAL LINK FT
“You can access the FollowHealth Patient Portal, offered by [facility], by registering with the following website: [email or web address]”

## 2017-01-18 NOTE — DISCHARGE NOTE ADULT - MEDICATION SUMMARY - MEDICATIONS TO TAKE
I will START or STAY ON the medications listed below when I get home from the hospital:    Medrol Dosepak 4 mg oral tablet  -- 1 packet(s) by mouth once take as directed  -- Indication: For Neck pain    traMADol 50 mg oral tablet  -- 1 tab(s) by mouth 4 times a day, As needed, pain  -- Indication: For Neck pain    aspirin 81 mg oral tablet  -- 1 tab(s) by mouth once a day  -- Indication: For cad    Diovan 80 mg oral tablet  -- 1 tab(s) by mouth once a day  -- Indication: For Hypertension    tamsulosin 0.4 mg oral capsule  -- 1 cap(s) by mouth once a day (at bedtime)  -- Indication: For bph    Zocor 40 mg oral tablet  -- 1 tab(s) by mouth once a day (at bedtime)  -- Indication: For Hypercholesterolemia     QUEtiapine  -- 12.5 milligram(s) by mouth once a day (at bedtime)  -- Indication: For Antipsychotic    Xanax 0.25 mg oral tablet  -- 1 tab(s) by mouth 3 times a day, As Needed  -- Indication: For Anxiety    metoprolol tartrate 50 mg oral tablet  -- 1 tab(s) by mouth 2 times a day  -- Indication: For Hypertension    Fosamax  -- 35  by mouth once a day  -- Indication: For osteoporosis    amLODIPine 10 mg oral tablet  -- 1 tab(s) by mouth once a day  -- Indication: For Hypertension    lidocaine 5% topical film  -- Apply on skin to affected area once a day  -- Indication: For pain    guaiFENesin 100 mg/5 mL oral liquid  -- 5 milliliter(s) by mouth every 6 hours, As needed, Cough  -- Indication: For cough    docusate sodium 100 mg oral capsule  -- 1 cap(s) by mouth 3 times a day  -- Indication: For constipation    methocarbamol 750 mg oral tablet  -- 1 tab(s) by mouth 3 times a day  -- Indication: For Muscle relaxent    lactobacillus acidophilus oral capsule  -- 1  by mouth 3 times a day  -- Indication: For probiotics    pantoprazole 40 mg oral delayed release tablet  -- 1 tab(s) by mouth once a day (before a meal)  -- Indication: For GERD (gastroesophageal reflux disease)    levoFLOXacin 250 mg oral tablet  -- 1 tab(s) by mouth every 24 hours  -- Indication: For Bronchitis    Synthroid 75 mcg (0.075 mg) oral tablet  -- 1 tab(s) by mouth once a day  -- Indication: For Hypothyroidism

## 2017-01-18 NOTE — DISCHARGE NOTE ADULT - SECONDARY DIAGNOSIS.
Hyperlipidemia, unspecified hyperlipidemia type Neck pain Retention of urine Fever, unspecified fever cause Essential hypertension Chronic midline low back pain without sciatica

## 2017-01-18 NOTE — DISCHARGE NOTE ADULT - CARE PROVIDER_API CALL
Amico, Frank J (DO), Internal Medicine  [address] [place] [address]  Phone: [phone]  Fax: [phone]    Edith Pro), Internal Medicine  [address] [place] [address]  Phone: [phone]  Fax: [phone]

## 2017-01-18 NOTE — DISCHARGE NOTE ADULT - HOSPITAL COURSE
This is a 89 YO W F presented to ER after patient felt feverish and had cough after exposed to cold air at rehab.  She was discharged from rehab couple of few days ago.  had associated with chills and occasional diaphoresis.  Denies dysuria.    Patient treated with IV antibiotics and condition improved and d/c home.

## 2017-01-19 LAB
CULTURE RESULTS: SIGNIFICANT CHANGE UP
CULTURE RESULTS: SIGNIFICANT CHANGE UP
SPECIMEN SOURCE: SIGNIFICANT CHANGE UP
SPECIMEN SOURCE: SIGNIFICANT CHANGE UP

## 2017-01-20 DIAGNOSIS — R50.9 FEVER, UNSPECIFIED: ICD-10-CM

## 2017-01-20 DIAGNOSIS — M54.2 CERVICALGIA: ICD-10-CM

## 2017-01-20 DIAGNOSIS — I25.10 ATHEROSCLEROTIC HEART DISEASE OF NATIVE CORONARY ARTERY WITHOUT ANGINA PECTORIS: ICD-10-CM

## 2017-01-20 DIAGNOSIS — M54.5 LOW BACK PAIN: ICD-10-CM

## 2017-01-20 DIAGNOSIS — K21.9 GASTRO-ESOPHAGEAL REFLUX DISEASE WITHOUT ESOPHAGITIS: ICD-10-CM

## 2017-01-20 DIAGNOSIS — R33.9 RETENTION OF URINE, UNSPECIFIED: ICD-10-CM

## 2017-01-20 DIAGNOSIS — D72.829 ELEVATED WHITE BLOOD CELL COUNT, UNSPECIFIED: ICD-10-CM

## 2017-01-20 DIAGNOSIS — D50.0 IRON DEFICIENCY ANEMIA SECONDARY TO BLOOD LOSS (CHRONIC): ICD-10-CM

## 2017-01-20 DIAGNOSIS — E03.9 HYPOTHYROIDISM, UNSPECIFIED: ICD-10-CM

## 2017-01-20 DIAGNOSIS — I10 ESSENTIAL (PRIMARY) HYPERTENSION: ICD-10-CM

## 2017-01-20 DIAGNOSIS — Z86.718 PERSONAL HISTORY OF OTHER VENOUS THROMBOSIS AND EMBOLISM: ICD-10-CM

## 2017-01-20 DIAGNOSIS — E87.5 HYPERKALEMIA: ICD-10-CM

## 2017-01-20 DIAGNOSIS — J18.9 PNEUMONIA, UNSPECIFIED ORGANISM: ICD-10-CM

## 2017-01-20 DIAGNOSIS — Z79.01 LONG TERM (CURRENT) USE OF ANTICOAGULANTS: ICD-10-CM

## 2017-01-20 DIAGNOSIS — E78.5 HYPERLIPIDEMIA, UNSPECIFIED: ICD-10-CM

## 2017-01-20 DIAGNOSIS — Z79.82 LONG TERM (CURRENT) USE OF ASPIRIN: ICD-10-CM

## 2017-01-20 DIAGNOSIS — F41.9 ANXIETY DISORDER, UNSPECIFIED: ICD-10-CM

## 2017-01-20 DIAGNOSIS — Z95.1 PRESENCE OF AORTOCORONARY BYPASS GRAFT: ICD-10-CM

## 2017-01-20 DIAGNOSIS — Z95.0 PRESENCE OF CARDIAC PACEMAKER: ICD-10-CM

## 2017-05-22 ENCOUNTER — INPATIENT (INPATIENT)
Facility: HOSPITAL | Age: 82
LOS: 2 days | Discharge: HOME HEALTH | DRG: 378 | End: 2017-05-25
Attending: FAMILY MEDICINE | Admitting: FAMILY MEDICINE
Payer: MEDICARE

## 2017-05-22 VITALS
RESPIRATION RATE: 15 BRPM | TEMPERATURE: 98 F | DIASTOLIC BLOOD PRESSURE: 60 MMHG | HEART RATE: 63 BPM | SYSTOLIC BLOOD PRESSURE: 96 MMHG

## 2017-05-22 DIAGNOSIS — E03.9 HYPOTHYROIDISM, UNSPECIFIED: ICD-10-CM

## 2017-05-22 DIAGNOSIS — I10 ESSENTIAL (PRIMARY) HYPERTENSION: ICD-10-CM
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
## 2017-05-22 DIAGNOSIS — M10.9 GOUT, UNSPECIFIED: ICD-10-CM

## 2017-05-22 DIAGNOSIS — Z95.0 PRESENCE OF CARDIAC PACEMAKER: Chronic | ICD-10-CM

## 2017-05-22 DIAGNOSIS — K52.9 NONINFECTIVE GASTROENTERITIS AND COLITIS, UNSPECIFIED: ICD-10-CM

## 2017-05-22 DIAGNOSIS — K92.2 GASTROINTESTINAL HEMORRHAGE, UNSPECIFIED: ICD-10-CM

## 2017-05-22 PROBLEM — M54.2 CERVICALGIA: Chronic | Status: ACTIVE | Noted: 2017-01-15

## 2017-05-22 PROBLEM — K21.9 GASTRO-ESOPHAGEAL REFLUX DISEASE WITHOUT ESOPHAGITIS: Chronic | Status: ACTIVE | Noted: 2017-01-15

## 2017-05-22 PROBLEM — R33.9 RETENTION OF URINE, UNSPECIFIED: Chronic | Status: ACTIVE | Noted: 2017-01-15

## 2017-05-22 PROBLEM — R45.1 RESTLESSNESS AND AGITATION: Chronic | Status: ACTIVE | Noted: 2017-01-15

## 2017-05-22 PROBLEM — F41.9 ANXIETY DISORDER, UNSPECIFIED: Chronic | Status: ACTIVE | Noted: 2017-01-15

## 2017-05-22 LAB
ALBUMIN SERPL ELPH-MCNC: 4 G/DL — SIGNIFICANT CHANGE UP (ref 3.3–5)
ALP SERPL-CCNC: 86 U/L — SIGNIFICANT CHANGE UP (ref 40–120)
ALT FLD-CCNC: 20 U/L — SIGNIFICANT CHANGE UP (ref 12–78)
ANION GAP SERPL CALC-SCNC: 9 MMOL/L — SIGNIFICANT CHANGE UP (ref 5–17)
APPEARANCE UR: CLEAR — SIGNIFICANT CHANGE UP
AST SERPL-CCNC: 30 U/L — SIGNIFICANT CHANGE UP (ref 15–37)
BACTERIA # UR AUTO: ABNORMAL
BASOPHILS # BLD AUTO: 0.1 K/UL — SIGNIFICANT CHANGE UP (ref 0–0.2)
BASOPHILS NFR BLD AUTO: 0.5 % — SIGNIFICANT CHANGE UP (ref 0–2)
BILIRUB SERPL-MCNC: 0.5 MG/DL — SIGNIFICANT CHANGE UP (ref 0.2–1.2)
BILIRUB UR-MCNC: NEGATIVE — SIGNIFICANT CHANGE UP
BUN SERPL-MCNC: 35 MG/DL — HIGH (ref 7–23)
CALCIUM SERPL-MCNC: 9.3 MG/DL — SIGNIFICANT CHANGE UP (ref 8.5–10.1)
CHLORIDE SERPL-SCNC: 107 MMOL/L — SIGNIFICANT CHANGE UP (ref 96–108)
CO2 SERPL-SCNC: 25 MMOL/L — SIGNIFICANT CHANGE UP (ref 22–31)
COLOR SPEC: SIGNIFICANT CHANGE UP
CREAT SERPL-MCNC: 1.2 MG/DL — SIGNIFICANT CHANGE UP (ref 0.5–1.3)
DIFF PNL FLD: NEGATIVE — SIGNIFICANT CHANGE UP
EOSINOPHIL # BLD AUTO: 0 K/UL — SIGNIFICANT CHANGE UP (ref 0–0.5)
EOSINOPHIL NFR BLD AUTO: 0.2 % — SIGNIFICANT CHANGE UP (ref 0–6)
EPI CELLS # UR: SIGNIFICANT CHANGE UP
ERYTHROCYTE [SEDIMENTATION RATE] IN BLOOD: 34 MM/HR — HIGH (ref 0–20)
GLUCOSE SERPL-MCNC: 126 MG/DL — HIGH (ref 70–99)
GLUCOSE UR QL: NEGATIVE — SIGNIFICANT CHANGE UP
HCT VFR BLD CALC: 35.1 % — SIGNIFICANT CHANGE UP (ref 34.5–45)
HCT VFR BLD CALC: 36.2 % — SIGNIFICANT CHANGE UP (ref 34.5–45)
HCT VFR BLD CALC: 40.8 % — SIGNIFICANT CHANGE UP (ref 34.5–45)
HGB BLD-MCNC: 11 G/DL — LOW (ref 11.5–15.5)
HGB BLD-MCNC: 11.2 G/DL — LOW (ref 11.5–15.5)
HGB BLD-MCNC: 12.9 G/DL — SIGNIFICANT CHANGE UP (ref 11.5–15.5)
KETONES UR-MCNC: NEGATIVE — SIGNIFICANT CHANGE UP
LEUKOCYTE ESTERASE UR-ACNC: ABNORMAL
LIDOCAIN IGE QN: 217 U/L — SIGNIFICANT CHANGE UP (ref 73–393)
LYMPHOCYTES # BLD AUTO: 1.3 K/UL — SIGNIFICANT CHANGE UP (ref 1–3.3)
LYMPHOCYTES # BLD AUTO: 10.9 % — LOW (ref 13–44)
MCHC RBC-ENTMCNC: 30.3 PG — SIGNIFICANT CHANGE UP (ref 27–34)
MCHC RBC-ENTMCNC: 30.6 PG — SIGNIFICANT CHANGE UP (ref 27–34)
MCHC RBC-ENTMCNC: 30.8 GM/DL — LOW (ref 32–36)
MCHC RBC-ENTMCNC: 31.1 PG — SIGNIFICANT CHANGE UP (ref 27–34)
MCHC RBC-ENTMCNC: 31.4 GM/DL — LOW (ref 32–36)
MCHC RBC-ENTMCNC: 31.5 GM/DL — LOW (ref 32–36)
MCV RBC AUTO: 96.3 FL — SIGNIFICANT CHANGE UP (ref 80–100)
MCV RBC AUTO: 99.2 FL — SIGNIFICANT CHANGE UP (ref 80–100)
MCV RBC AUTO: 99.3 FL — SIGNIFICANT CHANGE UP (ref 80–100)
MONOCYTES # BLD AUTO: 0.8 K/UL — SIGNIFICANT CHANGE UP (ref 0–0.9)
MONOCYTES NFR BLD AUTO: 6.8 % — SIGNIFICANT CHANGE UP (ref 1–9)
NEUTROPHILS # BLD AUTO: 9.6 K/UL — HIGH (ref 1.8–7.4)
NEUTROPHILS NFR BLD AUTO: 81.6 % — HIGH (ref 43–77)
NITRITE UR-MCNC: NEGATIVE — SIGNIFICANT CHANGE UP
OB PNL STL: POSITIVE
PH UR: 7 — SIGNIFICANT CHANGE UP (ref 5–8)
PLATELET # BLD AUTO: 169 K/UL — SIGNIFICANT CHANGE UP (ref 150–400)
PLATELET # BLD AUTO: 169 K/UL — SIGNIFICANT CHANGE UP (ref 150–400)
PLATELET # BLD AUTO: 228 K/UL — SIGNIFICANT CHANGE UP (ref 150–400)
POTASSIUM SERPL-MCNC: 5.2 MMOL/L — SIGNIFICANT CHANGE UP (ref 3.5–5.3)
POTASSIUM SERPL-SCNC: 5.2 MMOL/L — SIGNIFICANT CHANGE UP (ref 3.5–5.3)
PROCALCITONIN SERPL-MCNC: <0.05 NG/ML — SIGNIFICANT CHANGE UP (ref 0–0.05)
PROT SERPL-MCNC: 7.9 G/DL — SIGNIFICANT CHANGE UP (ref 6–8.3)
PROT UR-MCNC: NEGATIVE — SIGNIFICANT CHANGE UP
RBC # BLD: 3.54 M/UL — LOW (ref 3.8–5.2)
RBC # BLD: 3.65 M/UL — LOW (ref 3.8–5.2)
RBC # BLD: 4.24 M/UL — SIGNIFICANT CHANGE UP (ref 3.8–5.2)
RBC # FLD: 14 % — SIGNIFICANT CHANGE UP (ref 10.3–14.5)
RBC # FLD: 14.2 % — SIGNIFICANT CHANGE UP (ref 10.3–14.5)
RBC # FLD: 14.3 % — SIGNIFICANT CHANGE UP (ref 10.3–14.5)
SODIUM SERPL-SCNC: 141 MMOL/L — SIGNIFICANT CHANGE UP (ref 135–145)
SP GR SPEC: 1 — LOW (ref 1.01–1.02)
UROBILINOGEN FLD QL: NEGATIVE — SIGNIFICANT CHANGE UP
WBC # BLD: 11.7 K/UL — HIGH (ref 3.8–10.5)
WBC # BLD: 8.3 K/UL — SIGNIFICANT CHANGE UP (ref 3.8–10.5)
WBC # BLD: 8.6 K/UL — SIGNIFICANT CHANGE UP (ref 3.8–10.5)
WBC # FLD AUTO: 11.7 K/UL — HIGH (ref 3.8–10.5)
WBC # FLD AUTO: 8.3 K/UL — SIGNIFICANT CHANGE UP (ref 3.8–10.5)
WBC # FLD AUTO: 8.6 K/UL — SIGNIFICANT CHANGE UP (ref 3.8–10.5)
WBC UR QL: SIGNIFICANT CHANGE UP

## 2017-05-22 PROCEDURE — 99285 EMERGENCY DEPT VISIT HI MDM: CPT

## 2017-05-22 PROCEDURE — 74176 CT ABD & PELVIS W/O CONTRAST: CPT | Mod: 26

## 2017-05-22 RX ORDER — PANTOPRAZOLE SODIUM 20 MG/1
40 TABLET, DELAYED RELEASE ORAL EVERY 12 HOURS
Qty: 0 | Refills: 0 | Status: DISCONTINUED | OUTPATIENT
Start: 2017-05-22 | End: 2017-05-23

## 2017-05-22 RX ORDER — TRAMADOL HYDROCHLORIDE 50 MG/1
50 TABLET ORAL EVERY 6 HOURS
Qty: 0 | Refills: 0 | Status: DISCONTINUED | OUTPATIENT
Start: 2017-05-22 | End: 2017-05-22

## 2017-05-22 RX ORDER — ATORVASTATIN CALCIUM 80 MG/1
20 TABLET, FILM COATED ORAL AT BEDTIME
Qty: 0 | Refills: 0 | Status: DISCONTINUED | OUTPATIENT
Start: 2017-05-22 | End: 2017-05-25

## 2017-05-22 RX ORDER — SODIUM CHLORIDE 9 MG/ML
1000 INJECTION INTRAMUSCULAR; INTRAVENOUS; SUBCUTANEOUS
Qty: 0 | Refills: 0 | Status: DISCONTINUED | OUTPATIENT
Start: 2017-05-22 | End: 2017-05-23

## 2017-05-22 RX ORDER — LEVOTHYROXINE SODIUM 125 MCG
75 TABLET ORAL DAILY
Qty: 0 | Refills: 0 | Status: DISCONTINUED | OUTPATIENT
Start: 2017-05-22 | End: 2017-05-25

## 2017-05-22 RX ORDER — METRONIDAZOLE 500 MG
500 TABLET ORAL ONCE
Qty: 0 | Refills: 0 | Status: COMPLETED | OUTPATIENT
Start: 2017-05-22 | End: 2017-05-22

## 2017-05-22 RX ORDER — IOHEXOL 300 MG/ML
30 INJECTION, SOLUTION INTRAVENOUS ONCE
Qty: 0 | Refills: 0 | Status: COMPLETED | OUTPATIENT
Start: 2017-05-22 | End: 2017-05-22

## 2017-05-22 RX ORDER — TRAMADOL HYDROCHLORIDE 50 MG/1
25 TABLET ORAL
Qty: 0 | Refills: 0 | Status: DISCONTINUED | OUTPATIENT
Start: 2017-05-22 | End: 2017-05-25

## 2017-05-22 RX ORDER — METRONIDAZOLE 500 MG
500 TABLET ORAL EVERY 8 HOURS
Qty: 0 | Refills: 0 | Status: DISCONTINUED | OUTPATIENT
Start: 2017-05-22 | End: 2017-05-24

## 2017-05-22 RX ORDER — PIPERACILLIN AND TAZOBACTAM 4; .5 G/20ML; G/20ML
3.38 INJECTION, POWDER, LYOPHILIZED, FOR SOLUTION INTRAVENOUS ONCE
Qty: 0 | Refills: 0 | Status: COMPLETED | OUTPATIENT
Start: 2017-05-22 | End: 2017-05-22

## 2017-05-22 RX ORDER — PIPERACILLIN AND TAZOBACTAM 4; .5 G/20ML; G/20ML
3.38 INJECTION, POWDER, LYOPHILIZED, FOR SOLUTION INTRAVENOUS EVERY 8 HOURS
Qty: 0 | Refills: 0 | Status: DISCONTINUED | OUTPATIENT
Start: 2017-05-22 | End: 2017-05-22

## 2017-05-22 RX ORDER — PANTOPRAZOLE SODIUM 20 MG/1
40 TABLET, DELAYED RELEASE ORAL ONCE
Qty: 0 | Refills: 0 | Status: COMPLETED | OUTPATIENT
Start: 2017-05-22 | End: 2017-05-22

## 2017-05-22 RX ORDER — ACETAMINOPHEN 500 MG
650 TABLET ORAL EVERY 6 HOURS
Qty: 0 | Refills: 0 | Status: DISCONTINUED | OUTPATIENT
Start: 2017-05-22 | End: 2017-05-25

## 2017-05-22 RX ORDER — ACETAMINOPHEN 500 MG
650 TABLET ORAL EVERY 6 HOURS
Qty: 0 | Refills: 0 | Status: DISCONTINUED | OUTPATIENT
Start: 2017-05-22 | End: 2017-05-22

## 2017-05-22 RX ORDER — LACTOBACILLUS ACIDOPHILUS 100MM CELL
1 CAPSULE ORAL
Qty: 0 | Refills: 0 | Status: DISCONTINUED | OUTPATIENT
Start: 2017-05-22 | End: 2017-05-25

## 2017-05-22 RX ORDER — CIPROFLOXACIN LACTATE 400MG/40ML
200 VIAL (ML) INTRAVENOUS EVERY 12 HOURS
Qty: 0 | Refills: 0 | Status: DISCONTINUED | OUTPATIENT
Start: 2017-05-22 | End: 2017-05-24

## 2017-05-22 RX ORDER — SODIUM CHLORIDE 9 MG/ML
1000 INJECTION INTRAMUSCULAR; INTRAVENOUS; SUBCUTANEOUS ONCE
Qty: 0 | Refills: 0 | Status: COMPLETED | OUTPATIENT
Start: 2017-05-22 | End: 2017-05-22

## 2017-05-22 RX ORDER — SODIUM CHLORIDE 9 MG/ML
3 INJECTION INTRAMUSCULAR; INTRAVENOUS; SUBCUTANEOUS ONCE
Qty: 0 | Refills: 0 | Status: COMPLETED | OUTPATIENT
Start: 2017-05-22 | End: 2017-05-22

## 2017-05-22 RX ORDER — ALLOPURINOL 300 MG
100 TABLET ORAL DAILY
Qty: 0 | Refills: 0 | Status: DISCONTINUED | OUTPATIENT
Start: 2017-05-22 | End: 2017-05-25

## 2017-05-22 RX ADMIN — Medication 1 TABLET(S): at 17:01

## 2017-05-22 RX ADMIN — Medication 100 MILLIGRAM(S): at 21:08

## 2017-05-22 RX ADMIN — ATORVASTATIN CALCIUM 20 MILLIGRAM(S): 80 TABLET, FILM COATED ORAL at 21:08

## 2017-05-22 RX ADMIN — SODIUM CHLORIDE 1000 MILLILITER(S): 9 INJECTION INTRAMUSCULAR; INTRAVENOUS; SUBCUTANEOUS at 05:36

## 2017-05-22 RX ADMIN — Medication 1 TABLET(S): at 13:02

## 2017-05-22 RX ADMIN — SODIUM CHLORIDE 75 MILLILITER(S): 9 INJECTION INTRAMUSCULAR; INTRAVENOUS; SUBCUTANEOUS at 12:20

## 2017-05-22 RX ADMIN — Medication 100 MILLIGRAM(S): at 14:13

## 2017-05-22 RX ADMIN — PANTOPRAZOLE SODIUM 40 MILLIGRAM(S): 20 TABLET, DELAYED RELEASE ORAL at 17:01

## 2017-05-22 RX ADMIN — PANTOPRAZOLE SODIUM 40 MILLIGRAM(S): 20 TABLET, DELAYED RELEASE ORAL at 05:36

## 2017-05-22 RX ADMIN — Medication 100 MILLIGRAM(S): at 09:05

## 2017-05-22 RX ADMIN — IOHEXOL 30 MILLILITER(S): 300 INJECTION, SOLUTION INTRAVENOUS at 05:36

## 2017-05-22 RX ADMIN — Medication 100 MILLIGRAM(S): at 18:12

## 2017-05-22 RX ADMIN — Medication 100 MILLIGRAM(S): at 13:02

## 2017-05-22 RX ADMIN — PIPERACILLIN AND TAZOBACTAM 200 GRAM(S): 4; .5 INJECTION, POWDER, LYOPHILIZED, FOR SOLUTION INTRAVENOUS at 09:04

## 2017-05-22 RX ADMIN — SODIUM CHLORIDE 3 MILLILITER(S): 9 INJECTION INTRAMUSCULAR; INTRAVENOUS; SUBCUTANEOUS at 05:29

## 2017-05-22 NOTE — H&P ADULT - HISTORY OF PRESENT ILLNESS
This is a 89 F with PMH of HTN CAD PPM HLD hypothyroid who came in c/o bloody diarrhea. Patient reports having crampy abdominal pain yesterday. Today when she went to the bathroom there was diarrhea mixed with blood. She had one large bloody BM before coming to the ER. She denies n/v, CP, AGARWAL, SOB, palpitations, fevers, chills. This has never happened before. She has never had a colonoscopy. She is not on any blood thinners.

## 2017-05-22 NOTE — CHART NOTE - NSCHARTNOTEFT_GEN_A_CORE
Called by nurse because patient reported that she fell while getting out of the tub 1 or 2 weeks ago. She went to grab the grab handle and the handle came out of the wall. Then she fell backwards and she hit her head. She denies LOC, dizziness, headache, blurry vision, palpitations.     HEAD: there is a very small bump on the left side of her head, no open wound/laceration; skin intact    Will order CT head to r/o bleed even though it is highly unlikely considering the fall was so many days ago.

## 2017-05-22 NOTE — PROGRESS NOTE ADULT - SUBJECTIVE AND OBJECTIVE BOX
Patient is a 89y old  Female who presents with a chief complaint of bloody diarrhea (22 May 2017 11:22)      INTERVAL /OVERNIGHT EVENTS: s/p fall at home, abd pain and diarhea    MEDICATIONS  (STANDING):  allopurinol 100milliGRAM(s) Oral daily  atorvastatin 20milliGRAM(s) Oral at bedtime  levothyroxine 75MICROGram(s) Oral daily  sodium chloride 0.9%. 1000milliLiter(s) IV Continuous <Continuous>  metroNIDAZOLE  IVPB 500milliGRAM(s) IV Intermittent every 8 hours  lactobacillus acidophilus 1Tablet(s) Oral three times a day with meals  pantoprazole  Injectable 40milliGRAM(s) IV Push every 12 hours  ciprofloxacin   IVPB 200milliGRAM(s) IV Intermittent every 12 hours  multivitamin 1Tablet(s) Oral daily    MEDICATIONS  (PRN):  acetaminophen   Tablet 650milliGRAM(s) Oral every 6 hours PRN For Temp greater than 38 C (100.4 F)  traMADol 25milliGRAM(s) Oral four times a day PRN Mild Pain (1 - 3)      Allergies    No Known Allergies    Intolerances        REVIEW OF SYSTEMS:  CONSTITUTIONAL: No fever, weight loss, or fatigue  EYES: No eye pain, visual disturbances, or discharge  ENMT:  No difficulty hearing, tinnitus, vertigo; No sinus or throat pain  NECK: No pain or stiffness  RESPIRATORY: No cough, wheezing, chills or hemoptysis; No shortness of breath  CARDIOVASCULAR: No chest pain, palpitations, dizziness, or leg swelling  GASTROINTESTINAL: No abdominal or epigastric pain. No nausea, vomiting, or hematemesis; No diarrhea or constipation. No melena or hematochezia.  GENITOURINARY: No dysuria, frequency, hematuria, or incontinence  NEUROLOGICAL: No headaches, memory loss, loss of strength, numbness, or tremors  SKIN: No itching, burning, rashes, or lesions   LYMPH NODES: No enlarged glands  ENDOCRINE: No heat or cold intolerance; No hair loss; No polydipsia or polyuria  MUSCULOSKELETAL: No joint pain or swelling; No muscle, back, or extremity pain  PSYCHIATRIC: No depression, anxiety, mood swings, or difficulty sleeping  HEME/LYMPH: No easy bruising, or bleeding gums  ALLERGY AND IMMUNOLOGIC: No hives or eczema    Vital Signs Last 24 Hrs  T(C): 36.7, Max: 36.7 ( @ 14:46)  T(F): 98, Max: 98 ( @ 14:46)  HR: 61 (60 - 66)  BP: 106/58 (96/60 - 113/57)  BP(mean): --  RR: 15 (15 - 16)  SpO2: 97% (97% - 99%)    PHYSICAL EXAM:  GENERAL: NAD, well-groomed, well-developed  HEAD:  Atraumatic, Normocephalic  EYES: EOMI, PERRLA, conjunctiva and sclera clear  ENMT: No tonsillar erythema, exudates, or enlargement; Moist mucous membranes, Good dentition, No lesions  NECK: Supple, No JVD, Normal thyroid  NERVOUS SYSTEM:  Alert & Oriented X3, Good concentration; Motor Strength 5/5 B/L upper and lower extremities; DTRs 2+ intact and symmetric  CHEST/LUNG: Clear to auscultation bilaterally; No rales, rhonchi, wheezing, or rubs  HEART: Regular rate and rhythm; No murmurs, rubs, or gallops  ABDOMEN: Soft, +tender, Nondistended; Bowel sounds present  EXTREMITIES:  2+ Peripheral Pulses, No clubbing, cyanosis, or edema  LYMPH: No lymphadenopathy noted  SKIN: No rashes or lesions    LABS:                        11.2   8.3   )-----------( 169      ( 22 May 2017 13:04 )             36.2     22 May 2017 05:28    141    |  107    |  35     ----------------------------<  126    5.2     |  25     |  1.20     Ca    9.3        22 May 2017 05:28    TPro  7.9    /  Alb  4.0    /  TBili  0.5    /  DBili  x      /  AST  30     /  ALT  20     /  AlkPhos  86     22 May 2017 05:28      Urinalysis Basic - ( 22 May 2017 07:29 )    Color: Pale Yellow / Appearance: Clear / S.005 / pH: x  Gluc: x / Ketone: Negative  / Bili: Negative / Urobili: Negative   Blood: x / Protein: Negative / Nitrite: Negative   Leuk Esterase: Small / RBC: x / WBC 3-5   Sq Epi: x / Non Sq Epi: Few / Bacteria: Occasional      CAPILLARY BLOOD GLUCOSE      RADIOLOGY & ADDITIONAL TESTS: EXAM:  CT ABDOMEN AND PELVIS OC                            PROCEDURE DATE:  2017        INTERPRETATION:  History: Abdominal pain, rectal bleeding.    CT abdomen and pelvis, only oral contrast.   In situ cardiac pacer. Coronary artery calcification. Fibroatelectatic   stranding left lung base.  Calcified gallstones. Unenhanced liver pancreas spleen not remarkable.  There is a 1.6 cm right adrenal nodule consistent with a cortical adenoma.  Multiple bilateral renal cysts. Bilateral extrarenal pelvis. No   hydronephrosis or urolithiasis.  Markedly atherosclerotic nonaneurysmal abdominal aorta.  No suspicious retroperitoneal mesenteric adenopathy.  Mild wall thickening involving the sigmoid descending and transverse   colon including the region of the splenic flexure consistent with   colitis, inflammatory/infectious versus ischemic. There is no bowel   obstruction, free air free fluid or abscess. Uterus normal for age.   Bladder not remarkable.  Spinal degenerative change.    Impression:    Uncomplicated colitis involving sigmoid and descending and transverse   colon. Consider inflammatory/infectious versus ischemic etiology.      Notes Reviewed:  [ x] YES  [ ] NO    Care Discussed with Consultants/Other Providers [x ] YES  [ ] NO

## 2017-05-22 NOTE — ED PROVIDER NOTE - PROGRESS NOTE DETAILS
dr matute paged regarding consultation for pt with uncomplicated colitis and gi b dr matute paged regarding consultation for pt with uncomplicated colitis and gib, he asks for gi consult by garett and isa after which dispo will be considered this was relayed to pt who is agreeable to plan dw gi dr mendiola will see pt after his review of ct p joseph and gloria by dr ILA Lord, gi needs admit dr matute notified by him

## 2017-05-22 NOTE — ED ADULT NURSE NOTE - PMH
Agitation    Anxiety    CAD (coronary artery disease)    Cataract  right eye  Chronic back pain    DVT (deep venous thrombosis), right    Essential hypertension    GERD (gastroesophageal reflux disease)    Hyperlipidemia, unspecified hyperlipidemia type    Hypothyroid    Neck pain    Retention of urine

## 2017-05-22 NOTE — CONSULT NOTE ADULT - SUBJECTIVE AND OBJECTIVE BOX
Chief Complaint:  Patient is a 89y old  Female who presents with a chief complaint of bloody diarrhea (22 May 2017 11:22)    Anxiety  GERD (gastroesophageal reflux disease)  Retention of urine  Agitation  Neck pain  Hyperlipidemia, unspecified hyperlipidemia type  Essential hypertension  Chronic back pain  CAD (coronary artery disease)  Cataract  Hypothyroid  DVT (deep venous thrombosis), right  Pacemaker  S/P coronary artery bypass graft x 1     HPI:  This is a 89 F with PMH of HTN CAD PPM HLD hypothyroid who came in c/o bloody diarrhea. Patient reports having crampy abdominal pain yesterday. Today when she went to the bathroom there was diarrhea mixed with blood. She had one large bloody BM before coming to the ER. She denies n/v, CP, AGARWAL, SOB, palpitations, fevers, chills. This has never happened before. She has never had a colonoscopy. She is not on any blood thinners. (22 May 2017 11:22)      No Known Allergies      allopurinol 100milliGRAM(s) Oral daily  atorvastatin 20milliGRAM(s) Oral at bedtime  levothyroxine 75MICROGram(s) Oral daily  sodium chloride 0.9%. 1000milliLiter(s) IV Continuous <Continuous>  piperacillin/tazobactam IVPB. 3.375Gram(s) IV Intermittent every 8 hours  metroNIDAZOLE  IVPB 500milliGRAM(s) IV Intermittent every 8 hours  lactobacillus acidophilus 1Tablet(s) Oral three times a day with meals  pantoprazole  Injectable 40milliGRAM(s) IV Push every 12 hours  acetaminophen   Tablet 650milliGRAM(s) Oral every 6 hours PRN  acetaminophen   Tablet. 650milliGRAM(s) Oral every 6 hours PRN        FAMILY HISTORY:  No pertinent family history in first degree relatives        Review of Systems:    General:  No wt loss, fevers, chills, night sweats,fatigue,   Eyes:  Good vision, no reported pain  ENT:  No sore throat, pain, runny nose, dysphagia  CV:  No pain, palpitatioins, hypo/hypertension  Resp:  No dyspnea, cough, tachypnea, wheezing  :  No pain, bleeding, incontinence, nocturia  Muscle:  No pain, weakness  Neuro:  No weakness, tingling, memory problems  Psych:  No fatigue, insomnia, mood problems, depression  Endocrine:  No polyuria, polydypsia, cold/heat intolerance  Heme:  No petechiae, ecchymosis, easy bruisability  Skin:  No rash, tattoos, scars, edema    Relevant Family History:       Relevant Social History:       Physical Exam:    Vital Signs:  Vital Signs Last 24 Hrs  T(C): 36.6, Max: 36.6 ( @ 05:02)  T(F): 97.8, Max: 97.9 ( @ 05:02)  HR: 60 (60 - 66)  BP: 113/57 (96/60 - 113/57)  BP(mean): --  RR: 16 (15 - 16)  SpO2: 98% (98% - 99%)  Daily     Daily     General:  Appears stated age, well-groomed, well-nourished, no distress  HEENT:  NC/AT,  conjunctivae clear and pink, no thyromegaly, nodules, adenopathy, no JVD  Chest:  Full & symmetric excursion, no increased effort, breath sounds clear  Cardiovascular:  Regular rhythm, S1, S2, no murmur/rub/S3/S4, no abdominal bruit, no edema  Abdomen:  Soft, non-tender, non-distended, normoactive bowel sounds,  no masses ,no hepatosplenomeagaly, no signs of chronic liver disease  Extremities:  no cyanosis,clubbing or edema  Skin:  No rash/erythema/ecchymoses/petechiae/wounds/abscess/warm/dry  Neuro/Psych:  Alert, oriented, no asterixis, no tremor, no encephalopathy    Laboratory:                            11.2   8.3   )-----------( 169      ( 22 May 2017 13:04 )             36.2         141  |  107  |  35<H>  ----------------------------<  126<H>  5.2   |  25  |  1.20    Ca    9.3      22 May 2017 05:28    TPro  7.9  /  Alb  4.0  /  TBili  0.5  /  DBili  x   /  AST  30  /  ALT  20  /  AlkPhos  86      LIVER FUNCTIONS - ( 22 May 2017 05:28 )  Alb: 4.0 g/dL / Pro: 7.9 g/dL / ALK PHOS: 86 U/L / ALT: 20 U/L / AST: 30 U/L / GGT: x             Urinalysis Basic - ( 22 May 2017 07:29 )    Color: Pale Yellow / Appearance: Clear / S.005 / pH: x  Gluc: x / Ketone: Negative  / Bili: Negative / Urobili: Negative   Blood: x / Protein: Negative / Nitrite: Negative   Leuk Esterase: Small / RBC: x / WBC 3-5   Sq Epi: x / Non Sq Epi: Few / Bacteria: Occasional      Amylase Serum--      Lipase [id number]       Ammonia--    Imaging:

## 2017-05-23 DIAGNOSIS — Z71.89 OTHER SPECIFIED COUNSELING: ICD-10-CM

## 2017-05-23 DIAGNOSIS — R19.7 DIARRHEA, UNSPECIFIED: ICD-10-CM

## 2017-05-23 LAB
ANION GAP SERPL CALC-SCNC: 7 MMOL/L — SIGNIFICANT CHANGE UP (ref 5–17)
BUN SERPL-MCNC: 21 MG/DL — SIGNIFICANT CHANGE UP (ref 7–23)
CALCIUM SERPL-MCNC: 8.6 MG/DL — SIGNIFICANT CHANGE UP (ref 8.5–10.1)
CHLORIDE SERPL-SCNC: 114 MMOL/L — HIGH (ref 96–108)
CO2 SERPL-SCNC: 25 MMOL/L — SIGNIFICANT CHANGE UP (ref 22–31)
CREAT SERPL-MCNC: 0.94 MG/DL — SIGNIFICANT CHANGE UP (ref 0.5–1.3)
CRP SERPL-MCNC: 0.5 MG/DL — HIGH (ref 0–0.4)
GLUCOSE SERPL-MCNC: 92 MG/DL — SIGNIFICANT CHANGE UP (ref 70–99)
HBA1C BLD-MCNC: 5.7 % — HIGH (ref 4–5.6)
HCT VFR BLD CALC: 34.5 % — SIGNIFICANT CHANGE UP (ref 34.5–45)
HCT VFR BLD CALC: 34.8 % — SIGNIFICANT CHANGE UP (ref 34.5–45)
HGB BLD-MCNC: 10.5 G/DL — LOW (ref 11.5–15.5)
HGB BLD-MCNC: 11 G/DL — LOW (ref 11.5–15.5)
MAGNESIUM SERPL-MCNC: 2 MG/DL — SIGNIFICANT CHANGE UP (ref 1.6–2.6)
MCHC RBC-ENTMCNC: 29.6 PG — SIGNIFICANT CHANGE UP (ref 27–34)
MCHC RBC-ENTMCNC: 30.5 GM/DL — LOW (ref 32–36)
MCHC RBC-ENTMCNC: 31 PG — SIGNIFICANT CHANGE UP (ref 27–34)
MCHC RBC-ENTMCNC: 31.6 GM/DL — LOW (ref 32–36)
MCV RBC AUTO: 97 FL — SIGNIFICANT CHANGE UP (ref 80–100)
MCV RBC AUTO: 98.1 FL — SIGNIFICANT CHANGE UP (ref 80–100)
OB PNL STL: NEGATIVE — SIGNIFICANT CHANGE UP
PLATELET # BLD AUTO: 146 K/UL — LOW (ref 150–400)
PLATELET # BLD AUTO: 150 K/UL — SIGNIFICANT CHANGE UP (ref 150–400)
POTASSIUM SERPL-MCNC: 4.5 MMOL/L — SIGNIFICANT CHANGE UP (ref 3.5–5.3)
POTASSIUM SERPL-SCNC: 4.5 MMOL/L — SIGNIFICANT CHANGE UP (ref 3.5–5.3)
RBC # BLD: 3.55 M/UL — LOW (ref 3.8–5.2)
RBC # BLD: 3.55 M/UL — LOW (ref 3.8–5.2)
RBC # FLD: 14 % — SIGNIFICANT CHANGE UP (ref 10.3–14.5)
RBC # FLD: 14.2 % — SIGNIFICANT CHANGE UP (ref 10.3–14.5)
SODIUM SERPL-SCNC: 146 MMOL/L — HIGH (ref 135–145)
WBC # BLD: 6.2 K/UL — SIGNIFICANT CHANGE UP (ref 3.8–10.5)
WBC # BLD: 7 K/UL — SIGNIFICANT CHANGE UP (ref 3.8–10.5)
WBC # FLD AUTO: 6.2 K/UL — SIGNIFICANT CHANGE UP (ref 3.8–10.5)
WBC # FLD AUTO: 7 K/UL — SIGNIFICANT CHANGE UP (ref 3.8–10.5)
WRIGHT STN STL: NEGATIVE — SIGNIFICANT CHANGE UP

## 2017-05-23 RX ORDER — CHOLESTYRAMINE 4 G/9G
4 POWDER, FOR SUSPENSION ORAL DAILY
Qty: 0 | Refills: 0 | Status: DISCONTINUED | OUTPATIENT
Start: 2017-05-23 | End: 2017-05-25

## 2017-05-23 RX ORDER — RISPERIDONE 4 MG/1
0.5 TABLET ORAL AT BEDTIME
Qty: 0 | Refills: 0 | Status: DISCONTINUED | OUTPATIENT
Start: 2017-05-23 | End: 2017-05-25

## 2017-05-23 RX ORDER — PANTOPRAZOLE SODIUM 20 MG/1
40 TABLET, DELAYED RELEASE ORAL
Qty: 0 | Refills: 0 | Status: DISCONTINUED | OUTPATIENT
Start: 2017-05-23 | End: 2017-05-25

## 2017-05-23 RX ADMIN — RISPERIDONE 0.5 MILLIGRAM(S): 4 TABLET ORAL at 23:08

## 2017-05-23 RX ADMIN — Medication 100 MILLIGRAM(S): at 13:31

## 2017-05-23 RX ADMIN — Medication 75 MICROGRAM(S): at 05:32

## 2017-05-23 RX ADMIN — Medication 100 MILLIGRAM(S): at 23:02

## 2017-05-23 RX ADMIN — CHOLESTYRAMINE 4 GRAM(S): 4 POWDER, FOR SUSPENSION ORAL at 08:58

## 2017-05-23 RX ADMIN — SODIUM CHLORIDE 75 MILLILITER(S): 9 INJECTION INTRAMUSCULAR; INTRAVENOUS; SUBCUTANEOUS at 05:31

## 2017-05-23 RX ADMIN — Medication 100 MILLIGRAM(S): at 11:46

## 2017-05-23 RX ADMIN — Medication 1 TABLET(S): at 11:46

## 2017-05-23 RX ADMIN — Medication 1 TABLET(S): at 07:38

## 2017-05-23 RX ADMIN — Medication 100 MILLIGRAM(S): at 17:52

## 2017-05-23 RX ADMIN — SODIUM CHLORIDE 50 MILLILITER(S): 9 INJECTION INTRAMUSCULAR; INTRAVENOUS; SUBCUTANEOUS at 10:06

## 2017-05-23 RX ADMIN — PANTOPRAZOLE SODIUM 40 MILLIGRAM(S): 20 TABLET, DELAYED RELEASE ORAL at 05:32

## 2017-05-23 RX ADMIN — ATORVASTATIN CALCIUM 20 MILLIGRAM(S): 80 TABLET, FILM COATED ORAL at 23:02

## 2017-05-23 RX ADMIN — Medication 100 MILLIGRAM(S): at 05:31

## 2017-05-23 RX ADMIN — Medication 1 TABLET(S): at 17:07

## 2017-05-23 RX ADMIN — Medication 100 MILLIGRAM(S): at 05:32

## 2017-05-23 NOTE — GOALS OF CARE CONVERSATION - PERSONAL ADVANCE DIRECTIVE - CONVERSATION DETAILS
met pt, A&O x3 at this moment, spoke to daughter juan francisco on speaker phone, verified hcp, copy from old chart, pt wants to add son Barry to the proxy list, discussed w daughter: is first, then Babar, and Barry. further discussion of form, pt directives: simple lay terms, pt would not want cpr, but to discuss further w family as well as feed tube. pt is full code at present, to talk more to family. hcp completed. contact # given

## 2017-05-23 NOTE — PHYSICAL THERAPY INITIAL EVALUATION ADULT - ADDITIONAL COMMENTS
Pt reports 0 stairs to navigate @ home. Pt has 2 steps with rails into the house and 1 in the back with rails. Pt has 2 steps with rails into the house and 1 in the back with rails. Pt lives at home with son who works. Other children/grandchildren live close by. Pt uses cane to ambulate in and outside of home. Pt sometimes uses rollator for exercise. Pt does not drive, takes cab or family member to appointments or store. Pt reports being independent with ADLs at home.

## 2017-05-23 NOTE — PHYSICAL THERAPY INITIAL EVALUATION ADULT - DISCHARGE DISPOSITION, PT EVAL
home w/ home PT/home w/ assist home w/ home PT/home w/ assist/Pt is interested in receiving an aid home w/ home PT/HCPT. Pt is interested in receiving a home health aid (pt declines HCPT services)/home w/ assist

## 2017-05-23 NOTE — CONSULT NOTE ADULT - SUBJECTIVE AND OBJECTIVE BOX
89yFemale admitted to med/surg with following history:  HPI:  This is a 89 F with PMH of HTN CAD PPM HLD hypothyroid who came in c/o bloody diarrhea. Patient reports having crampy abdominal pain yesterday. Today when she went to the bathroom there was diarrhea mixed with blood. She had one large bloody BM before coming to the ER. She denies n/v, CP, AGARWAL, SOB, palpitations, fevers, chills. This has never happened before. She has never had a colonoscopy. She is not on any blood thinners. (22 May 2017 11:22)      Psych HPI: Patient presented with agitation and confused. She reported auditory hallucinations, hearing music and occasional voices. At this time patient is calm and lucid, she denies symptoms of psychosis, charo or depression.    Past Psych Hx: Denies    PAST MEDICAL & SURGICAL HISTORY:  Anxiety  GERD (gastroesophageal reflux disease)  Retention of urine  Agitation  Neck pain  Hyperlipidemia, unspecified hyperlipidemia type  Essential hypertension  Chronic back pain  CAD (coronary artery disease)  Cataract: right eye  Hypothyroid  DVT (deep venous thrombosis), right  Pacemaker  S/P coronary artery bypass graft x 1      Allergies    No Known Allergies    Intolerances      MEDICATIONS  (STANDING):  allopurinol 100milliGRAM(s) Oral daily  atorvastatin 20milliGRAM(s) Oral at bedtime  levothyroxine 75MICROGram(s) Oral daily  metroNIDAZOLE  IVPB 500milliGRAM(s) IV Intermittent every 8 hours  lactobacillus acidophilus 1Tablet(s) Oral three times a day with meals  ciprofloxacin   IVPB 200milliGRAM(s) IV Intermittent every 12 hours  multivitamin 1Tablet(s) Oral daily  cholestyramine Powder (Sugar-Free) 4Gram(s) Oral daily  pantoprazole    Tablet 40milliGRAM(s) Oral before breakfast    MEDICATIONS  (PRN):  acetaminophen   Tablet 650milliGRAM(s) Oral every 6 hours PRN For Temp greater than 38 C (100.4 F)  traMADol 25milliGRAM(s) Oral four times a day PRN Mild Pain (1 - 3)      SH: Lives in [place] with her daughter, she is a   Sub Abuse Hx: Does not smoke, does not drink, no drugs    FPH: Denies    ROS: Psych: See HPI.  All other systems negative.                          11.0   6.2   )-----------( 146      ( 23 May 2017 06:35 )             34.8   23 May 2017 06:35    146    |  114    |  21     ----------------------------<  92     4.5     |  25     |  0.94     Ca    8.6        23 May 2017 06:35  Mg     2.0       23 May 2017 06:35    TPro  7.9    /  Alb  4.0    /  TBili  0.5    /  DBili  x      /  AST  30     /  ALT  20     /  AlkPhos  86     22 May 2017 05:28    TEXAM:  Vital Signs Last 24 Hrs  T(C): 36.6, Max: 36.8 (05-22 @ 22:51)  T(F): 97.9, Max: 98.2 (05-22 @ 22:51)  HR: 67 (61 - 75)  BP: 127/53 (106/58 - 135/52)  BP(mean): --  RR: 17 (15 - 17)  SpO2: 100% (96% - 100%)  Gen Appearance: Good hygiene and grooming  Gait/Station/Muscle Tone: WNL  Abnl Movements: Absent  Speech: [employer]orm[employer]  TP; WNL  Associations: WNL  TC: WNL  Mood: Somewhat anxious  Affect: Congruent  Consciousness/orientation: WNL  Memory:   Recent: Grossly intact    Remote: Intact  Attention/Concentration: Impaired  Language: WNL  Fund of Knowledge: Average  Insight: Fair in most instances  Judgment: Fair in most instances    Suicide Risk Assessment: Patient currently denies suicidal ideation, is future-oriented, and is able to contract for safety      DX: Psychosis NOS, r/o Delirium    REC: Risperdal 0.5 mg po at HS  Observation Status: Regular  Follow-up: As needed

## 2017-05-23 NOTE — PHYSICAL THERAPY INITIAL EVALUATION ADULT - IMPAIRMENTS CONTRIBUTING TO GAIT DEVIATIONS, PT EVAL
impaired balance Pt held onto IV pole with R hand throughout entire walk. Felt unsteady without assistive device./impaired balance impaired postural control/decreased strength/impaired balance/Pt held onto IV pole with R hand throughout entire walk. Felt unsteady without assistive device./decreased ROM

## 2017-05-23 NOTE — CONSULT NOTE ADULT - ASSESSMENT
Patient is 1) cognitively intact 2) Understands the nature of the medical condition, risks, benefits, alternatives and side-effects of treatment 3) Wants to make decisions voluntarily.  At this time patient has decision making capacity regarding medical decisions.

## 2017-05-23 NOTE — PHYSICAL THERAPY INITIAL EVALUATION ADULT - STRENGTHENING, PT EVAL
Pt will demonstrate a grade of 4+ for UE and LE manual muscle testing. Pt will demonstrate a grade of 4+/5 for UE and LE manual muscle testing /c in 5 sessions

## 2017-05-23 NOTE — PHYSICAL THERAPY INITIAL EVALUATION ADULT - GAIT TRAINING, PT EVAL
Patient will ambulate 200 feet with rollator/ rolling walker in 5 sessions. Patient will ambulate 150 feet independently /c or /s an assistive device. Patient will ambulate 150 feet independently /c or /s an assistive device /c in 1-2 sessions

## 2017-05-23 NOTE — PHYSICAL THERAPY INITIAL EVALUATION ADULT - GAIT PATTERN USED, PT EVAL
swing-through gait some postural sway, pt noted to be reaching for objects in order to stabilize herself when not holding onto IV pole/swing-through gait

## 2017-05-23 NOTE — PHYSICAL THERAPY INITIAL EVALUATION ADULT - CRITERIA FOR SKILLED THERAPEUTIC INTERVENTIONS
impairments found/anticipated discharge recommendation/HCPT (Pt refused) rehab potential/HCPT (Pt refused)/impairments found/anticipated discharge recommendation/therapy frequency

## 2017-05-23 NOTE — PHYSICAL THERAPY INITIAL EVALUATION ADULT - PERTINENT HX OF CURRENT PROBLEM, REHAB EVAL
presented to er after patient felt feverish and had cough after exposed to cold air at rehab.  Dc from rehab couple of days ago.  Associated with chills and occasional diahporesis. Per H&P: "This is a 89 F with PMH of HTN CAD PPM HLD hypothyroid who came in c/o bloody diarrhea. Patient reports having crampy abdominal pain yesterday. Today when she went to the bathroom there was diarrhea mixed with blood. She had one large bloody BM before coming to the ER. She denies n/v, CP, AGARWAL, SOB, palpitations, fevers, chills. This has never happened before. She has never had a colonoscopy. She is not on any blood thinners."

## 2017-05-23 NOTE — PROGRESS NOTE ADULT - SUBJECTIVE AND OBJECTIVE BOX
INTERVAL HPI/OVERNIGHT EVENTS:    MEDICATIONS  (STANDING):  allopurinol 100milliGRAM(s) Oral daily  atorvastatin 20milliGRAM(s) Oral at bedtime  levothyroxine 75MICROGram(s) Oral daily  metroNIDAZOLE  IVPB 500milliGRAM(s) IV Intermittent every 8 hours  lactobacillus acidophilus 1Tablet(s) Oral three times a day with meals  ciprofloxacin   IVPB 200milliGRAM(s) IV Intermittent every 12 hours  multivitamin 1Tablet(s) Oral daily  cholestyramine Powder (Sugar-Free) 4Gram(s) Oral daily  pantoprazole    Tablet 40milliGRAM(s) Oral before breakfast    MEDICATIONS  (PRN):  acetaminophen   Tablet 650milliGRAM(s) Oral every 6 hours PRN For Temp greater than 38 C (100.4 F)  traMADol 25milliGRAM(s) Oral four times a day PRN Mild Pain (1 - 3)      Allergies    No Known Allergies    Intolerances        Review of Systems:    General:  No wt loss, fevers, chills, night sweats,fatigue,   Eyes:  Good vision, no reported pain  ENT:  No sore throat, pain, runny nose, dysphagia  CV:  No pain, palpitatioins, hypo/hypertension  Resp:  No dyspnea, cough, tachypnea, wheezing  GI:  No pain, No nausea, No vomiting, No diarrhea, No constipatiion, No weight loss, No fever, No pruritis, No rectal bleeding, No tarry stools, No dysphagia,  :  No pain, bleeding, incontinence, nocturia  Muscle:  No pain, weakness  Neuro:  No weakness, tingling, memory problems  Psych:  No fatigue, insomnia, mood problems, depression  Endocrine:  No polyuria, polydypsia, cold/heat intolerance  Heme:  No petechiae, ecchymosis, easy bruisability  Skin:  No rash, tattoos, scars, edema      Vital Signs Last 24 Hrs  T(C): 36.6, Max: 36.8 (05- @ 22:51)  T(F): 97.9, Max: 98.2 (05- @ 22:51)  HR: 75 (60 - 75)  BP: 135/52 (99/60 - 135/52)  BP(mean): --  RR: 16 (15 - 16)  SpO2: 100% (96% - 100%)    PHYSICAL EXAM:    Constitutional: NAD, well-developed  HEENT: EOMI, throat clear  Neck: No LAD, supple  Respiratory: CTA and P  Cardiovascular: S1 and S2, RRR, no M  Gastrointestinal: BS+, soft, NT/ND, neg HSM,  Extremities: No peripheral edema, neg clubing, cyanosis  Vascular: 2+ peripheral pulses  Neurological: A/O x 3, no focal deficits  Psychiatric: Normal mood, normal affect  Skin: No rashes      LABS:                        11.0   6.2   )-----------( 146      ( 23 May 2017 06:35 )             34.8         146<H>  |  114<H>  |  21  ----------------------------<  92  4.5   |  25  |  0.94    Ca    8.6      23 May 2017 06:35  Mg     2.0         TPro  7.9  /  Alb  4.0  /  TBili  0.5  /  DBili  x   /  AST  30  /  ALT  20  /  AlkPhos  86        Urinalysis Basic - ( 22 May 2017 07:29 )    Color: Pale Yellow / Appearance: Clear / S.005 / pH: x  Gluc: x / Ketone: Negative  / Bili: Negative / Urobili: Negative   Blood: x / Protein: Negative / Nitrite: Negative   Leuk Esterase: Small / RBC: x / WBC 3-5   Sq Epi: x / Non Sq Epi: Few / Bacteria: Occasional        RADIOLOGY & ADDITIONAL TESTS:

## 2017-05-23 NOTE — PHYSICAL THERAPY INITIAL EVALUATION ADULT - ORIENTATION, REHAB EVAL
situation/person/place oriented to person, place, time and situation/Pt did not know day of week in month

## 2017-05-23 NOTE — PHYSICAL THERAPY INITIAL EVALUATION ADULT - RANGE OF MOTION EXAMINATION, REHAB EVAL
no ROM deficits were identified bilateral upper extremity ROM was WFL (within functional limits)/Arthritic changes noted throughout, knuckle hypertrophy, excessive kyphosis./bilateral lower extremity ROM was WFL (within functional limits) bilateral lower extremity ROM was WFL (within functional limits)/bilateral upper extremity ROM was WFL (within functional limits)/Arthritic changes noted throughout, knuckle hypertrophy, excessive kyphosis./deficits as listed below

## 2017-05-23 NOTE — PROGRESS NOTE ADULT - SUBJECTIVE AND OBJECTIVE BOX
Patient is a 89y old  Female who presents with a chief complaint of bloody diarrhea (22 May 2017 11:22)      INTERVAL /OVERNIGHT EVENTS: still having bloody diarrhea and abdominal cramping    MEDICATIONS  (STANDING):  allopurinol 100milliGRAM(s) Oral daily  atorvastatin 20milliGRAM(s) Oral at bedtime  levothyroxine 75MICROGram(s) Oral daily  metroNIDAZOLE  IVPB 500milliGRAM(s) IV Intermittent every 8 hours  lactobacillus acidophilus 1Tablet(s) Oral three times a day with meals  ciprofloxacin   IVPB 200milliGRAM(s) IV Intermittent every 12 hours  multivitamin 1Tablet(s) Oral daily  cholestyramine Powder (Sugar-Free) 4Gram(s) Oral daily  pantoprazole    Tablet 40milliGRAM(s) Oral before breakfast    MEDICATIONS  (PRN):  acetaminophen   Tablet 650milliGRAM(s) Oral every 6 hours PRN For Temp greater than 38 C (100.4 F)  traMADol 25milliGRAM(s) Oral four times a day PRN Mild Pain (1 - 3)      Allergies    No Known Allergies    Intolerances        REVIEW OF SYSTEMS:  CONSTITUTIONAL: No fever, weight loss, or fatigue  EYES: No eye pain, visual disturbances, or discharge  ENMT:  No difficulty hearing, tinnitus, vertigo; No sinus or throat pain  NECK: No pain or stiffness  RESPIRATORY: No cough, wheezing, chills or hemoptysis; No shortness of breath  CARDIOVASCULAR: No chest pain, palpitations, dizziness, or leg swelling  GASTROINTESTINAL: +abdominal  pain. No nausea, vomiting, or hematemesis; No diarrhea or constipation. No melena or hematochezia.  GENITOURINARY: No dysuria, frequency, hematuria, or incontinence  NEUROLOGICAL: No headaches, memory loss, loss of strength, numbness, or tremors  SKIN: No itching, burning, rashes, or lesions   LYMPH NODES: No enlarged glands  ENDOCRINE: No heat or cold intolerance; No hair loss; No polydipsia or polyuria  MUSCULOSKELETAL: No joint pain or swelling; No muscle, back, or extremity pain  PSYCHIATRIC: No depression, anxiety, mood swings, or difficulty sleeping  HEME/LYMPH: No easy bruising, or bleeding gums  ALLERGY AND IMMUNOLOGIC: No hives or eczema    Vital Signs Last 24 Hrs  T(C): 36.6, Max: 36.8 ( @ 22:51)  T(F): 97.9, Max: 98.2 ( @ 22:51)  HR: 75 (60 - 75)  BP: 135/52 (99/60 - 135/52)  BP(mean): --  RR: 16 (15 - 16)  SpO2: 100% (96% - 100%)    PHYSICAL EXAM:  GENERAL: NAD, well-groomed, well-developed  HEAD:  Atraumatic, Normocephalic  EYES: EOMI, PERRLA, conjunctiva and sclera clear  ENMT: No tonsillar erythema, exudates, or enlargement; Moist mucous membranes, Good dentition, No lesions  NECK: Supple, No JVD, Normal thyroid  NERVOUS SYSTEM:  Alert & Oriented X3, Good concentration; Motor Strength 5/5 B/L upper and lower extremities; DTRs 2+ intact and symmetric  CHEST/LUNG: Clear to auscultation bilaterally; No rales, rhonchi, wheezing, or rubs  HEART: Regular rate and rhythm; No murmurs, rubs, or gallops  ABDOMEN: Soft, +tender, Nondistended; Bowel sounds present  EXTREMITIES:  2+ Peripheral Pulses, No clubbing, cyanosis, or edema  LYMPH: No lymphadenopathy noted  SKIN: No rashes or lesions    LABS:                        11.0   6.2   )-----------( 146      ( 23 May 2017 06:35 )             34.8     23 May 2017 06:35    146    |  114    |  21     ----------------------------<  92     4.5     |  25     |  0.94     Ca    8.6        23 May 2017 06:35  Mg     2.0       23 May 2017 06:35        Urinalysis Basic - ( 22 May 2017 07:29 )    Color: Pale Yellow / Appearance: Clear / S.005 / pH: x  Gluc: x / Ketone: Negative  / Bili: Negative / Urobili: Negative   Blood: x / Protein: Negative / Nitrite: Negative   Leuk Esterase: Small / RBC: x / WBC 3-5   Sq Epi: x / Non Sq Epi: Few / Bacteria: Occasional      CAPILLARY BLOOD GLUCOSE      RADIOLOGY & ADDITIONAL TESTS: EXAM:  CT ABDOMEN AND PELVIS OC                            PROCEDURE DATE:  2017        INTERPRETATION:  History: Abdominal pain, rectal bleeding.    CT abdomen and pelvis, only oral contrast.   In situ cardiac pacer. Coronary artery calcification. Fibroatelectatic   stranding left lung base.  Calcified gallstones. Unenhanced liver pancreas spleen not remarkable.  There is a 1.6 cm right adrenal nodule consistent with a cortical adenoma.  Multiple bilateral renal cysts. Bilateral extrarenal pelvis. No   hydronephrosis or urolithiasis.  Markedly atherosclerotic nonaneurysmal abdominal aorta.  No suspicious retroperitoneal mesenteric adenopathy.  Mild wall thickening involving the sigmoid descending and transverse   colon including the region of the splenic flexure consistent with   colitis, inflammatory/infectious versus ischemic. There is no bowel   obstruction, free air free fluid or abscess. Uterus normal for age.   Bladder not remarkable.  Spinal degenerative change.    Impression:    Uncomplicated colitis involving sigmoid and descending and transverse   colon. Consider inflammatory/infectious versus ischemic etiology.        Notes Reviewed:  [x ] YES  [ ] NO    Care Discussed with Consultants/Other Providers [ x] YES  [ ] NO

## 2017-05-23 NOTE — PHYSICAL THERAPY INITIAL EVALUATION ADULT - BALANCE DISTURBANCE, IDENTIFIED IMPAIRMENT CONTRIBUTE, REHAB EVAL
decreased strength/impaired postural control decreased strength/impaired postural control/decreased ROM

## 2017-05-24 LAB
HCT VFR BLD CALC: 35.2 % — SIGNIFICANT CHANGE UP (ref 34.5–45)
HGB BLD-MCNC: 11 G/DL — LOW (ref 11.5–15.5)
MCHC RBC-ENTMCNC: 30.5 PG — SIGNIFICANT CHANGE UP (ref 27–34)
MCHC RBC-ENTMCNC: 31.3 GM/DL — LOW (ref 32–36)
MCV RBC AUTO: 97.3 FL — SIGNIFICANT CHANGE UP (ref 80–100)
PLATELET # BLD AUTO: 154 K/UL — SIGNIFICANT CHANGE UP (ref 150–400)
RBC # BLD: 3.62 M/UL — LOW (ref 3.8–5.2)
RBC # FLD: 14 % — SIGNIFICANT CHANGE UP (ref 10.3–14.5)
WBC # BLD: 7.2 K/UL — SIGNIFICANT CHANGE UP (ref 3.8–10.5)
WBC # FLD AUTO: 7.2 K/UL — SIGNIFICANT CHANGE UP (ref 3.8–10.5)

## 2017-05-24 PROCEDURE — 70450 CT HEAD/BRAIN W/O DYE: CPT | Mod: 26

## 2017-05-24 RX ORDER — CIPROFLOXACIN LACTATE 400MG/40ML
250 VIAL (ML) INTRAVENOUS
Qty: 0 | Refills: 0 | Status: DISCONTINUED | OUTPATIENT
Start: 2017-05-24 | End: 2017-05-25

## 2017-05-24 RX ORDER — METRONIDAZOLE 500 MG
500 TABLET ORAL EVERY 8 HOURS
Qty: 0 | Refills: 0 | Status: DISCONTINUED | OUTPATIENT
Start: 2017-05-24 | End: 2017-05-25

## 2017-05-24 RX ORDER — PANTOPRAZOLE SODIUM 20 MG/1
1 TABLET, DELAYED RELEASE ORAL
Qty: 30 | Refills: 0
Start: 2017-05-24 | End: 2017-06-23

## 2017-05-24 RX ORDER — LACTOBACILLUS ACIDOPHILUS 100MM CELL
1 CAPSULE ORAL
Qty: 0 | Refills: 0 | COMMUNITY
Start: 2017-05-24

## 2017-05-24 RX ORDER — TRAMADOL HYDROCHLORIDE 50 MG/1
1 TABLET ORAL
Qty: 0 | Refills: 0 | COMMUNITY

## 2017-05-24 RX ORDER — DIPHENOXYLATE HCL/ATROPINE 2.5-.025MG
1 TABLET ORAL
Qty: 0 | Refills: 0 | Status: DISCONTINUED | OUTPATIENT
Start: 2017-05-24 | End: 2017-05-25

## 2017-05-24 RX ORDER — CHOLESTYRAMINE 4 G/9G
1 POWDER, FOR SUSPENSION ORAL
Qty: 5 | Refills: 0 | OUTPATIENT
Start: 2017-05-24 | End: 2017-05-29

## 2017-05-24 RX ADMIN — RISPERIDONE 0.5 MILLIGRAM(S): 4 TABLET ORAL at 21:58

## 2017-05-24 RX ADMIN — Medication 1 TABLET(S): at 13:05

## 2017-05-24 RX ADMIN — Medication 100 MILLIGRAM(S): at 06:56

## 2017-05-24 RX ADMIN — PANTOPRAZOLE SODIUM 40 MILLIGRAM(S): 20 TABLET, DELAYED RELEASE ORAL at 06:56

## 2017-05-24 RX ADMIN — Medication 1 TABLET(S): at 13:04

## 2017-05-24 RX ADMIN — Medication 100 MILLIGRAM(S): at 06:55

## 2017-05-24 RX ADMIN — Medication 75 MICROGRAM(S): at 06:56

## 2017-05-24 RX ADMIN — CHOLESTYRAMINE 4 GRAM(S): 4 POWDER, FOR SUSPENSION ORAL at 13:04

## 2017-05-24 RX ADMIN — Medication 500 MILLIGRAM(S): at 21:58

## 2017-05-24 RX ADMIN — Medication 100 MILLIGRAM(S): at 13:05

## 2017-05-24 RX ADMIN — Medication 1 TABLET(S): at 17:25

## 2017-05-24 RX ADMIN — Medication 250 MILLIGRAM(S): at 17:25

## 2017-05-24 RX ADMIN — Medication 500 MILLIGRAM(S): at 16:12

## 2017-05-24 NOTE — PROGRESS NOTE ADULT - SUBJECTIVE AND OBJECTIVE BOX
Patient is a 89y old  Female who presents with a chief complaint of bloody diarrhea (22 May 2017 11:22)      INTERVAL /OVERNIGHT EVENTS: still has diarrhea and abdominal cramps, not comfortable going home today    MEDICATIONS  (STANDING):  allopurinol 100milliGRAM(s) Oral daily  atorvastatin 20milliGRAM(s) Oral at bedtime  levothyroxine 75MICROGram(s) Oral daily  lactobacillus acidophilus 1Tablet(s) Oral three times a day with meals  multivitamin 1Tablet(s) Oral daily  cholestyramine Powder (Sugar-Free) 4Gram(s) Oral daily  pantoprazole    Tablet 40milliGRAM(s) Oral before breakfast  risperiDONE   Tablet 0.5milliGRAM(s) Oral at bedtime  metroNIDAZOLE    Tablet 500milliGRAM(s) Oral every 8 hours  ciprofloxacin     Tablet 250milliGRAM(s) Oral two times a day    MEDICATIONS  (PRN):  acetaminophen   Tablet 650milliGRAM(s) Oral every 6 hours PRN For Temp greater than 38 C (100.4 F)  traMADol 25milliGRAM(s) Oral four times a day PRN Mild Pain (1 - 3)  diphenoxylate/atropine 1Tablet(s) Oral four times a day PRN Diarrhea      Allergies    No Known Allergies    Intolerances        REVIEW OF SYSTEMS:  CONSTITUTIONAL: No fever, weight loss, or fatigue  EYES: No eye pain, visual disturbances, or discharge  ENMT:  No difficulty hearing, tinnitus, vertigo; No sinus or throat pain  NECK: No pain or stiffness  RESPIRATORY: No cough, wheezing, chills or hemoptysis; No shortness of breath  CARDIOVASCULAR: No chest pain, palpitations, dizziness, or leg swelling  GASTROINTESTINAL: +abdominal  pain. No nausea, vomiting, or hematemesis; No diarrhea or constipation. No melena or hematochezia.  GENITOURINARY: No dysuria, frequency, hematuria, or incontinence  NEUROLOGICAL: No headaches, memory loss, loss of strength, numbness, or tremors  SKIN: No itching, burning, rashes, or lesions   LYMPH NODES: No enlarged glands  ENDOCRINE: No heat or cold intolerance; No hair loss; No polydipsia or polyuria  MUSCULOSKELETAL: No joint pain or swelling; No muscle, back, or extremity pain  PSYCHIATRIC: No depression, anxiety, mood swings, or difficulty sleeping  HEME/LYMPH: No easy bruising, or bleeding gums  ALLERGY AND IMMUNOLOGIC: No hives or eczema    Vital Signs Last 24 Hrs  T(C): 36.7, Max: 36.8 (05-23 @ 21:24)  T(F): 98, Max: 98.3 (05-23 @ 21:24)  HR: 62 (60 - 62)  BP: 163/67 (115/60 - 163/67)  BP(mean): --  RR: 16 (16 - 16)  SpO2: 94% (94% - 99%)    PHYSICAL EXAM:  GENERAL: NAD, well-groomed, well-developed  HEAD:  Atraumatic, Normocephalic  EYES: EOMI, PERRLA, conjunctiva and sclera clear  ENMT: No tonsillar erythema, exudates, or enlargement; Moist mucous membranes, Good dentition, No lesions  NECK: Supple, No JVD, Normal thyroid  NERVOUS SYSTEM:  Alert & Oriented X3, Good concentration; Motor Strength 5/5 B/L upper and lower extremities; DTRs 2+ intact and symmetric  CHEST/LUNG: Clear to auscultation bilaterally; No rales, rhonchi, wheezing, or rubs  HEART: Regular rate and rhythm; No murmurs, rubs, or gallops  ABDOMEN: Soft, +tender, Nondistended; Bowel sounds present  EXTREMITIES:  2+ Peripheral Pulses, No clubbing, cyanosis, or edema  LYMPH: No lymphadenopathy noted  SKIN: No rashes or lesions    LABS:                        11.0   7.2   )-----------( 154      ( 24 May 2017 08:50 )             35.2       Ca    8.6        23 May 2017 06:35          CAPILLARY BLOOD GLUCOSE      RADIOLOGY & ADDITIONAL TESTS: EXAM:  CT BRAIN                            PROCEDURE DATE:  05/24/2017        INTERPRETATION:  Clinical History: Headache following fall 2010 days ago.    Noncontrast CT scan of brain is performed with axial images obtained from   skull base to vertex.    There is cerebral volume loss, without mass effect or midline shift.    Periventricular white matter low attenuation is compatible with   microvascular ischemic disease.    No acute parenchymal hemorrhage or large territorial infarction is noted.   No extra-axial fluid collection is demonstrated.    The visualized paranasal sinuses appear unremarkable.    Impression:    No acute intracranial pathology noted.      Notes Reviewed:  [x ] YES  [ ] NO    Care Discussed with Consultants/Other Providers [x ] YES  [ ] NO

## 2017-05-24 NOTE — PROGRESS NOTE ADULT - SUBJECTIVE AND OBJECTIVE BOX
INTERVAL HPI/OVERNIGHT EVENTS:  HPI:  This is a 89 F with PMH of HTN CAD PPM HLD hypothyroid who came in c/o bloody diarrhea. Patient reports having crampy abdominal pain yesterday. Today when she went to the bathroom there was diarrhea mixed with blood. She had one large bloody BM before coming to the ER. She denies n/v, CP, AGARWAL, SOB, palpitations, fevers, chills. This has never happened before. She has never had a colonoscopy. She is not on any blood thinners. No new overnight event.  No N/V/D.  Tolerating diet.      MEDICATIONS  (STANDING):  allopurinol 100milliGRAM(s) Oral daily  atorvastatin 20milliGRAM(s) Oral at bedtime  levothyroxine 75MICROGram(s) Oral daily  metroNIDAZOLE  IVPB 500milliGRAM(s) IV Intermittent every 8 hours  lactobacillus acidophilus 1Tablet(s) Oral three times a day with meals  ciprofloxacin   IVPB 200milliGRAM(s) IV Intermittent every 12 hours  multivitamin 1Tablet(s) Oral daily  cholestyramine Powder (Sugar-Free) 4Gram(s) Oral daily  pantoprazole    Tablet 40milliGRAM(s) Oral before breakfast  risperiDONE   Tablet 0.5milliGRAM(s) Oral at bedtime    MEDICATIONS  (PRN):  acetaminophen   Tablet 650milliGRAM(s) Oral every 6 hours PRN For Temp greater than 38 C (100.4 F)  traMADol 25milliGRAM(s) Oral four times a day PRN Mild Pain (1 - 3)      Allergies    No Known Allergies    Intolerances          General:  No wt loss, fevers, chills, night sweats, fatigue,   Eyes:  Good vision, no reported pain  ENT:  No sore throat, pain, runny nose, dysphagia  CV:  No pain, palpitations, hypo/hypertension  Resp:  No dyspnea, cough, tachypnea, wheezing  GI:  No pain, No nausea, No vomiting, No diarrhea, No constipation, No weight loss, No fever, No pruritis, No rectal bleeding, No tarry stools, No dysphagia,  :  No pain, bleeding, incontinence, nocturia  Muscle:  No pain, weakness  Neuro:  No weakness, tingling, memory problems  Psych:  No fatigue, insomnia, mood problems, depression  Endocrine:  No polyuria, polydipsia, cold/heat intolerance  Heme:  No petechiae, ecchymosis, easy bruisability  Skin:  No rash, tattoos, scars, edema      PHYSICAL EXAM:   Vital Signs:  Vital Signs Last 24 Hrs  T(C): 36.8, Max: 36.8 ( @ 21:24)  T(F): 98.3, Max: 98.3 ( @ 21:24)  HR: 60 (60 - 75)  BP: 115/60 (115/60 - 127/53)  BP(mean): --  RR: 16 (16 - 17)  SpO2: 98% (98% - 100%)  Daily     Daily Weight in k.8 (24 May 2017 05:30)I&O's Summary    I & Os for current day (as of 24 May 2017 09:03)  =============================================  IN: 500 ml / OUT: 0 ml / NET: 500 ml      GENERAL:  Appears stated age, well-groomed, well-nourished, no distress  HEENT:  NC/AT,  conjunctivae clear and pink, no thyromegaly, nodules, adenopathy, no JVD, sclera -anicteric  CHEST:  Full & symmetric excursion, no increased effort, breath sounds clear  HEART:  Regular rhythm, S1, S2, no murmur/rub/S3/S4, no abdominal bruit, no edema  ABDOMEN:  Soft, non-tender, non-distended, normoactive bowel sounds,  no masses ,no hepato-splenomegaly, no signs of chronic liver disease  EXTEREMITIES:  no cyanosis,clubbing or edema  SKIN:  No rash/erythema/ecchymoses/petechiae/wounds/abscess/warm/dry  NEURO:  Alert, oriented, no asterixis, no tremor, no encephalopathy      LABS:                        11.0   7.2   )-----------( 154      ( 24 May 2017 08:50 )             35.2     -    146<H>  |  114<H>  |  21  ----------------------------<  92  4.5   |  25  |  0.94    Ca    8.6      23 May 2017 06:35  Mg     2.0               amylase   lipaseLipase, Serum: 217 U/L ( @ 05:28)    RADIOLOGY & ADDITIONAL TESTS:

## 2017-05-24 NOTE — PROGRESS NOTE ADULT - PROBLEM SELECTOR PLAN 6
refusing CT head  PT eval appreciated  refusing HCPT
refusing CT head  PT eval appreciated  refusing HCPT / outpatient PT
refusing CT head  PT eval

## 2017-05-25 VITALS
TEMPERATURE: 99 F | RESPIRATION RATE: 16 BRPM | DIASTOLIC BLOOD PRESSURE: 71 MMHG | HEART RATE: 74 BPM | SYSTOLIC BLOOD PRESSURE: 121 MMHG | OXYGEN SATURATION: 98 %

## 2017-05-25 PROCEDURE — 70450 CT HEAD/BRAIN W/O DYE: CPT

## 2017-05-25 PROCEDURE — 83735 ASSAY OF MAGNESIUM: CPT

## 2017-05-25 PROCEDURE — 86140 C-REACTIVE PROTEIN: CPT

## 2017-05-25 PROCEDURE — 97110 THERAPEUTIC EXERCISES: CPT

## 2017-05-25 PROCEDURE — 80048 BASIC METABOLIC PNL TOTAL CA: CPT

## 2017-05-25 PROCEDURE — 74176 CT ABD & PELVIS W/O CONTRAST: CPT

## 2017-05-25 PROCEDURE — 83690 ASSAY OF LIPASE: CPT

## 2017-05-25 PROCEDURE — 99285 EMERGENCY DEPT VISIT HI MDM: CPT | Mod: 25

## 2017-05-25 PROCEDURE — 96365 THER/PROPH/DIAG IV INF INIT: CPT

## 2017-05-25 PROCEDURE — 82272 OCCULT BLD FECES 1-3 TESTS: CPT

## 2017-05-25 PROCEDURE — 81001 URINALYSIS AUTO W/SCOPE: CPT

## 2017-05-25 PROCEDURE — 85652 RBC SED RATE AUTOMATED: CPT

## 2017-05-25 PROCEDURE — 80053 COMPREHEN METABOLIC PANEL: CPT

## 2017-05-25 PROCEDURE — 82270 OCCULT BLOOD FECES: CPT

## 2017-05-25 PROCEDURE — 83036 HEMOGLOBIN GLYCOSYLATED A1C: CPT

## 2017-05-25 PROCEDURE — 85027 COMPLETE CBC AUTOMATED: CPT

## 2017-05-25 PROCEDURE — 89055 LEUKOCYTE ASSESSMENT FECAL: CPT

## 2017-05-25 PROCEDURE — 84145 PROCALCITONIN (PCT): CPT

## 2017-05-25 PROCEDURE — 97161 PT EVAL LOW COMPLEX 20 MIN: CPT

## 2017-05-25 PROCEDURE — 97116 GAIT TRAINING THERAPY: CPT

## 2017-05-25 RX ORDER — CIPROFLOXACIN LACTATE 400MG/40ML
1 VIAL (ML) INTRAVENOUS
Qty: 10 | Refills: 0 | OUTPATIENT
Start: 2017-05-25 | End: 2017-05-30

## 2017-05-25 RX ORDER — METRONIDAZOLE 500 MG
1 TABLET ORAL
Qty: 15 | Refills: 0 | OUTPATIENT
Start: 2017-05-25 | End: 2017-05-30

## 2017-05-25 RX ADMIN — Medication 1 TABLET(S): at 11:54

## 2017-05-25 RX ADMIN — Medication 250 MILLIGRAM(S): at 05:45

## 2017-05-25 RX ADMIN — CHOLESTYRAMINE 4 GRAM(S): 4 POWDER, FOR SUSPENSION ORAL at 10:17

## 2017-05-25 RX ADMIN — Medication 100 MILLIGRAM(S): at 11:54

## 2017-05-25 RX ADMIN — Medication 500 MILLIGRAM(S): at 05:45

## 2017-05-25 RX ADMIN — Medication 1 TABLET(S): at 09:09

## 2017-05-25 RX ADMIN — Medication 75 MICROGRAM(S): at 05:45

## 2017-05-25 NOTE — DISCHARGE NOTE ADULT - PROVIDER TOKENS
FREE:[LAST:[angela],FIRST:[tom],PHONE:[(   )    -],FAX:[(   )    -],ADDRESS:[[facility]TOKEN:'[id number]:'[id number]'

## 2017-05-25 NOTE — PROGRESS NOTE ADULT - SUBJECTIVE AND OBJECTIVE BOX
INTERVAL HPI/OVERNIGHT EVENTS:  HPI:  This is a 89 F with PMH of HTN CAD PPM HLD hypothyroid who came in c/o bloody diarrhea. Patient reports having crampy abdominal pain yesterday. Today when she went to the bathroom there was diarrhea mixed with blood. She had one large bloody BM before coming to the ER. She denies n/v, CP, AGARWAL, SOB, palpitations, fevers, chills. This has never happened before. She has never had a colonoscopy. She is not on any blood thinners. No new overnight event.  No N/V/D.  Tolerating diet.      MEDICATIONS  (STANDING):  allopurinol 100milliGRAM(s) Oral daily  atorvastatin 20milliGRAM(s) Oral at bedtime  levothyroxine 75MICROGram(s) Oral daily  lactobacillus acidophilus 1Tablet(s) Oral three times a day with meals  multivitamin 1Tablet(s) Oral daily  cholestyramine Powder (Sugar-Free) 4Gram(s) Oral daily  pantoprazole    Tablet 40milliGRAM(s) Oral before breakfast  risperiDONE   Tablet 0.5milliGRAM(s) Oral at bedtime  metroNIDAZOLE    Tablet 500milliGRAM(s) Oral every 8 hours  ciprofloxacin     Tablet 250milliGRAM(s) Oral two times a day    MEDICATIONS  (PRN):  acetaminophen   Tablet 650milliGRAM(s) Oral every 6 hours PRN For Temp greater than 38 C (100.4 F)  traMADol 25milliGRAM(s) Oral four times a day PRN Mild Pain (1 - 3)  diphenoxylate/atropine 1Tablet(s) Oral four times a day PRN Diarrhea      Allergies    No Known Allergies    Intolerances          General:  No wt loss, fevers, chills, night sweats, fatigue,   Eyes:  Good vision, no reported pain  ENT:  No sore throat, pain, runny nose, dysphagia  CV:  No pain, palpitations, hypo/hypertension  Resp:  No dyspnea, cough, tachypnea, wheezing  GI:  No pain, No nausea, No vomiting, No diarrhea, No constipation, No weight loss, No fever, No pruritis, No rectal bleeding, No tarry stools, No dysphagia,  :  No pain, bleeding, incontinence, nocturia  Muscle:  No pain, weakness  Neuro:  No weakness, tingling, memory problems  Psych:  No fatigue, insomnia, mood problems, depression  Endocrine:  No polyuria, polydipsia, cold/heat intolerance  Heme:  No petechiae, ecchymosis, easy bruisability  Skin:  No rash, tattoos, scars, edema      PHYSICAL EXAM:   Vital Signs:  Vital Signs Last 24 Hrs  T(C): 37.4, Max: 37.4 (05-25 @ 14:16)  T(F): 99.4, Max: 99.4 (05-25 @ 14:16)  HR: 74 (66 - 74)  BP: 121/71 (118/64 - 151/64)  BP(mean): --  RR: 16 (16 - 16)  SpO2: 98% (97% - 98%)  Daily     Daily I&O's Summary    I & Os for current day (as of 25 May 2017 19:09)  =============================================  IN: 800 ml / OUT: 0 ml / NET: 800 ml      GENERAL:  Appears stated age, well-groomed, well-nourished, no distress  HEENT:  NC/AT,  conjunctivae clear and pink, no thyromegaly, nodules, adenopathy, no JVD, sclera -anicteric  CHEST:  Full & symmetric excursion, no increased effort, breath sounds clear  HEART:  Regular rhythm, S1, S2, no murmur/rub/S3/S4, no abdominal bruit, no edema  ABDOMEN:  Soft, non-tender, non-distended, normoactive bowel sounds,  no masses ,no hepato-splenomegaly, no signs of chronic liver disease  EXTEREMITIES:  no cyanosis,clubbing or edema  SKIN:  No rash/erythema/ecchymoses/petechiae/wounds/abscess/warm/dry  NEURO:  Alert, oriented, no asterixis, no tremor, no encephalopathy      LABS:                        11.0   7.2   )-----------( 154      ( 24 May 2017 08:50 )             35.2               amylase   lipaseLipase, Serum: 217 U/L (05-22 @ 05:28)    RADIOLOGY & ADDITIONAL TESTS:

## 2017-05-25 NOTE — DISCHARGE NOTE ADULT - CARE PROVIDER_API CALL
tom miller / NEL Rodriguez  Phone: (   )    -  Fax: (   )    -    Josep Cali (), Gastroenterology; Internal Medicine  [address] [place] [address]  Phone: [phone]  Fax: [phone]    Joe Contreras), Psychiatry  [address] [place] [address]  Phone: [phone]  Fax: [phone]

## 2017-05-25 NOTE — PROGRESS NOTE ADULT - PROBLEM SELECTOR PLAN 1
clinically improving  continue antibiotics  advance diet  clinically improving   possible dc   bacid 2 tabs tid
clinically improving  continue antibiotics  advance diet  clinically improving   possible dc   bacid 2 tabs tid
-Admit to [facility]  -iv cipro/flagyl  -clear liquid diet  -IVF gently  -serial labs  -GI consult
-Admit to [facility]  change -iv cipro/flagyl to PO  -advance diet as tolerated  stop-IVF  -serial labs  -GI consult with isa de la cruz
-Admit to [facility]  continue-iv cipro/flagyl  -clear liquid diet to full diet today  stop-IVF  -serial labs  -GI consult with isa
clinically improving  continue antibiotics  advance diet  clinically improving   possible dc tommorrow

## 2017-05-25 NOTE — DISCHARGE NOTE ADULT - SECONDARY DIAGNOSIS.
Diarrhea Essential hypertension Fall GERD (gastroesophageal reflux disease) Hypothyroid Hyperlipidemia, unspecified hyperlipidemia type

## 2017-05-25 NOTE — DISCHARGE NOTE ADULT - CARE PLAN
Principal Discharge DX:	Colitis, acute  Goal:	free from abdominal pain  Instructions for follow-up, activity and diet:	follow up with GI Dr. HUGHES  Secondary Diagnosis:	Diarrhea  Secondary Diagnosis:	Essential hypertension  Secondary Diagnosis:	Fall  Secondary Diagnosis:	GERD (gastroesophageal reflux disease)  Secondary Diagnosis:	Hypothyroid  Secondary Diagnosis:	Hyperlipidemia, unspecified hyperlipidemia type

## 2017-05-25 NOTE — DISCHARGE NOTE ADULT - HOSPITAL COURSE
admitted for colitis   etiology unknown - possibly ischemic  diarhea - improved with questran and lomotil  H and H remained relatively stable  DC after GI clearance

## 2017-05-25 NOTE — DISCHARGE NOTE ADULT - MEDICATION SUMMARY - MEDICATIONS TO TAKE
I will START or STAY ON the medications listed below when I get home from the hospital:    metroNIDAZOLE 500 mg oral tablet  -- 1 tab(s) by mouth every 8 hours  -- Indication: For Colitis, acute    aspirin 81 mg oral tablet, chewable  -- 1 tab(s) by mouth once a day  -- Indication: For prevent heart disease    valsartan 80 mg oral capsule  -- 1 tab(s) by mouth once a day  -- Indication: For Essential hypertension    allopurinol 100 mg oral tablet  -- 1 tab(s) by mouth once a day  -- Indication: For Gout, unspecified cause, unspecified chronicity, unspecified site    atorvastatin 20 mg oral tablet  -- 1 tab(s) by mouth once a day  -- Indication: For High lipids    cholestyramine 4 g/9 g oral powder for reconstitution  -- 1 packet(s) by mouth once a day  -- Indication: For Diarrhea    amLODIPine 10 mg oral tablet  -- 1 tab(s) by mouth once a day  -- Indication: For Essential hypertension    lactobacillus acidophilus oral capsule  -- 1  by mouth once a day  -- Indication: For suplement    pantoprazole 40 mg oral delayed release tablet  -- 1 tab(s) by mouth once a day (before a meal)  -- Indication: For Gerd    ciprofloxacin 250 mg oral tablet  -- 1 tab(s) by mouth 2 times a day  -- Indication: For Colitis, acute    levothyroxine 75 mcg (0.075 mg) oral tablet  -- 1 tab(s) by mouth once a day  -- Indication: For Hypothyroidism, unspecified type    Multiple Vitamins oral tablet  -- 1 tab(s) by mouth once a day  -- Indication: For suplement

## 2017-05-25 NOTE — DISCHARGE NOTE ADULT - PATIENT PORTAL LINK FT
“You can access the [facility] Patient Portal, offered by [facility], by registering with the following website: [email or web address]”

## 2017-05-30 DIAGNOSIS — E78.5 HYPERLIPIDEMIA, UNSPECIFIED: ICD-10-CM

## 2017-05-30 DIAGNOSIS — I25.10 ATHEROSCLEROTIC HEART DISEASE OF NATIVE CORONARY ARTERY WITHOUT ANGINA PECTORIS: ICD-10-CM

## 2017-05-30 DIAGNOSIS — Z87.891 PERSONAL HISTORY OF NICOTINE DEPENDENCE: ICD-10-CM

## 2017-05-30 DIAGNOSIS — E03.9 HYPOTHYROIDISM, UNSPECIFIED: ICD-10-CM

## 2017-05-30 DIAGNOSIS — R19.7 DIARRHEA, UNSPECIFIED: ICD-10-CM

## 2017-05-30 DIAGNOSIS — Z79.01 LONG TERM (CURRENT) USE OF ANTICOAGULANTS: ICD-10-CM

## 2017-05-30 DIAGNOSIS — R44.0 AUDITORY HALLUCINATIONS: ICD-10-CM

## 2017-05-30 DIAGNOSIS — Z95.5 PRESENCE OF CORONARY ANGIOPLASTY IMPLANT AND GRAFT: ICD-10-CM

## 2017-05-30 DIAGNOSIS — G89.29 OTHER CHRONIC PAIN: ICD-10-CM

## 2017-05-30 DIAGNOSIS — F41.9 ANXIETY DISORDER, UNSPECIFIED: ICD-10-CM

## 2017-05-30 DIAGNOSIS — I10 ESSENTIAL (PRIMARY) HYPERTENSION: ICD-10-CM

## 2017-05-30 DIAGNOSIS — Z95.0 PRESENCE OF CARDIAC PACEMAKER: ICD-10-CM

## 2017-05-30 DIAGNOSIS — K21.9 GASTRO-ESOPHAGEAL REFLUX DISEASE WITHOUT ESOPHAGITIS: ICD-10-CM

## 2017-05-30 DIAGNOSIS — K92.2 GASTROINTESTINAL HEMORRHAGE, UNSPECIFIED: ICD-10-CM

## 2017-05-30 DIAGNOSIS — Z86.718 PERSONAL HISTORY OF OTHER VENOUS THROMBOSIS AND EMBOLISM: ICD-10-CM

## 2017-05-30 DIAGNOSIS — M54.2 CERVICALGIA: ICD-10-CM

## 2017-05-30 DIAGNOSIS — K55.9 VASCULAR DISORDER OF INTESTINE, UNSPECIFIED: ICD-10-CM

## 2017-05-30 DIAGNOSIS — H26.9 UNSPECIFIED CATARACT: ICD-10-CM

## 2017-05-30 DIAGNOSIS — R33.9 RETENTION OF URINE, UNSPECIFIED: ICD-10-CM

## 2017-12-27 ENCOUNTER — EMERGENCY (EMERGENCY)
Facility: HOSPITAL | Age: 82
LOS: 1 days | Discharge: HOME | End: 2017-12-27
Attending: EMERGENCY MEDICINE | Admitting: EMERGENCY MEDICINE
Payer: MEDICARE

## 2017-12-27 VITALS
TEMPERATURE: 98 F | OXYGEN SATURATION: 97 % | HEART RATE: 72 BPM | RESPIRATION RATE: 16 BRPM | WEIGHT: 145.06 LBS | SYSTOLIC BLOOD PRESSURE: 155 MMHG | DIASTOLIC BLOOD PRESSURE: 68 MMHG | HEIGHT: 62 IN

## 2017-12-27 DIAGNOSIS — Z95.0 PRESENCE OF CARDIAC PACEMAKER: Chronic | ICD-10-CM

## 2017-12-27 LAB
ALBUMIN SERPL ELPH-MCNC: 3.4 G/DL — SIGNIFICANT CHANGE UP (ref 3.3–5)
ALP SERPL-CCNC: 107 U/L — SIGNIFICANT CHANGE UP (ref 40–120)
ALT FLD-CCNC: 30 U/L — SIGNIFICANT CHANGE UP (ref 12–78)
ANION GAP SERPL CALC-SCNC: 8 MMOL/L — SIGNIFICANT CHANGE UP (ref 5–17)
AST SERPL-CCNC: 39 U/L — HIGH (ref 15–37)
BASOPHILS # BLD AUTO: 0.1 K/UL — SIGNIFICANT CHANGE UP (ref 0–0.2)
BASOPHILS NFR BLD AUTO: 0.5 % — SIGNIFICANT CHANGE UP (ref 0–2)
BILIRUB SERPL-MCNC: 0.6 MG/DL — SIGNIFICANT CHANGE UP (ref 0.2–1.2)
BUN SERPL-MCNC: 49 MG/DL — HIGH (ref 7–23)
CALCIUM SERPL-MCNC: 9.2 MG/DL — SIGNIFICANT CHANGE UP (ref 8.5–10.1)
CHLORIDE SERPL-SCNC: 109 MMOL/L — HIGH (ref 96–108)
CK SERPL-CCNC: 63 U/L — SIGNIFICANT CHANGE UP (ref 26–192)
CO2 SERPL-SCNC: 21 MMOL/L — LOW (ref 22–31)
CREAT SERPL-MCNC: 1 MG/DL — SIGNIFICANT CHANGE UP (ref 0.5–1.3)
EOSINOPHIL # BLD AUTO: 0.2 K/UL — SIGNIFICANT CHANGE UP (ref 0–0.5)
EOSINOPHIL NFR BLD AUTO: 1.4 % — SIGNIFICANT CHANGE UP (ref 0–6)
GLUCOSE SERPL-MCNC: 111 MG/DL — HIGH (ref 70–99)
HCT VFR BLD CALC: 37.4 % — SIGNIFICANT CHANGE UP (ref 34.5–45)
HGB BLD-MCNC: 11.3 G/DL — LOW (ref 11.5–15.5)
LYMPHOCYTES # BLD AUTO: 1.1 K/UL — SIGNIFICANT CHANGE UP (ref 1–3.3)
LYMPHOCYTES # BLD AUTO: 9.1 % — LOW (ref 13–44)
MCHC RBC-ENTMCNC: 29.2 PG — SIGNIFICANT CHANGE UP (ref 27–34)
MCHC RBC-ENTMCNC: 30.2 GM/DL — LOW (ref 32–36)
MCV RBC AUTO: 96.7 FL — SIGNIFICANT CHANGE UP (ref 80–100)
MONOCYTES # BLD AUTO: 1 K/UL — HIGH (ref 0–0.9)
MONOCYTES NFR BLD AUTO: 8.4 % — SIGNIFICANT CHANGE UP (ref 1–9)
NEUTROPHILS # BLD AUTO: 9.9 K/UL — HIGH (ref 1.8–7.4)
NEUTROPHILS NFR BLD AUTO: 80.6 % — HIGH (ref 43–77)
PLATELET # BLD AUTO: 207 K/UL — SIGNIFICANT CHANGE UP (ref 150–400)
POTASSIUM SERPL-MCNC: 5 MMOL/L — SIGNIFICANT CHANGE UP (ref 3.5–5.3)
POTASSIUM SERPL-SCNC: 5 MMOL/L — SIGNIFICANT CHANGE UP (ref 3.5–5.3)
PROT SERPL-MCNC: 8.1 G/DL — SIGNIFICANT CHANGE UP (ref 6–8.3)
RBC # BLD: 3.86 M/UL — SIGNIFICANT CHANGE UP (ref 3.8–5.2)
RBC # FLD: 14.5 % — SIGNIFICANT CHANGE UP (ref 10.3–14.5)
SODIUM SERPL-SCNC: 138 MMOL/L — SIGNIFICANT CHANGE UP (ref 135–145)
WBC # BLD: 12.2 K/UL — HIGH (ref 3.8–10.5)
WBC # FLD AUTO: 12.2 K/UL — HIGH (ref 3.8–10.5)

## 2017-12-27 PROCEDURE — 93970 EXTREMITY STUDY: CPT | Mod: 26

## 2017-12-27 PROCEDURE — 72170 X-RAY EXAM OF PELVIS: CPT | Mod: 26

## 2017-12-27 PROCEDURE — 99284 EMERGENCY DEPT VISIT MOD MDM: CPT | Mod: 25

## 2017-12-27 RX ORDER — OXYCODONE HYDROCHLORIDE 5 MG/1
1 TABLET ORAL
Qty: 12 | Refills: 0 | OUTPATIENT
Start: 2017-12-27 | End: 2017-12-29

## 2017-12-27 RX ORDER — OXYCODONE AND ACETAMINOPHEN 5; 325 MG/1; MG/1
1 TABLET ORAL ONCE
Qty: 0 | Refills: 0 | Status: DISCONTINUED | OUTPATIENT
Start: 2017-12-27 | End: 2017-12-27

## 2017-12-27 RX ADMIN — OXYCODONE AND ACETAMINOPHEN 1 TABLET(S): 5; 325 TABLET ORAL at 02:13

## 2017-12-27 NOTE — ED ADULT TRIAGE NOTE - CHIEF COMPLAINT QUOTE
as per EMS according to family "patient did not fall but has leg pain." pt presents alert and oriented to person, place, situation. as per EMS patient has dementia and periods of confusion.

## 2017-12-27 NOTE — ED PROVIDER NOTE - OBJECTIVE STATEMENT
89 yo female hx of dementia BIB son c/o b/l hip pain/leg pain for past 8 hours, here with son stating she fell 3 days ago, but has been using her walker well ever since fall.  Pain worse when flexing her hips.  No fever/chills.

## 2017-12-27 NOTE — ED ADULT NURSE NOTE - ED STAT RN HANDOFF DETAILS
Discharge instruction provided by Ange HAYDEN patient was ambulatory using roller walker without assistance and walked in the hallway. Patient accompanied by son home was wheeled by tech via wheelchair.

## 2017-12-28 ENCOUNTER — INPATIENT (INPATIENT)
Facility: HOSPITAL | Age: 82
LOS: 4 days | Discharge: NURSING, REHAB OR LONG-TERM CARE | DRG: 552 | End: 2018-01-02
Attending: INTERNAL MEDICINE | Admitting: INTERNAL MEDICINE
Payer: MEDICARE

## 2017-12-28 VITALS
OXYGEN SATURATION: 95 % | TEMPERATURE: 99 F | WEIGHT: 149.91 LBS | SYSTOLIC BLOOD PRESSURE: 161 MMHG | HEART RATE: 79 BPM | RESPIRATION RATE: 18 BRPM | DIASTOLIC BLOOD PRESSURE: 76 MMHG

## 2017-12-28 DIAGNOSIS — M54.9 DORSALGIA, UNSPECIFIED: ICD-10-CM

## 2017-12-28 DIAGNOSIS — E03.9 HYPOTHYROIDISM, UNSPECIFIED: ICD-10-CM

## 2017-12-28 DIAGNOSIS — R26.9 UNSPECIFIED ABNORMALITIES OF GAIT AND MOBILITY: ICD-10-CM

## 2017-12-28 DIAGNOSIS — I10 ESSENTIAL (PRIMARY) HYPERTENSION: ICD-10-CM

## 2017-12-28 DIAGNOSIS — Z95.0 PRESENCE OF CARDIAC PACEMAKER: Chronic | ICD-10-CM

## 2017-12-28 DIAGNOSIS — D72.829 ELEVATED WHITE BLOOD CELL COUNT, UNSPECIFIED: ICD-10-CM

## 2017-12-28 DIAGNOSIS — E78.5 HYPERLIPIDEMIA, UNSPECIFIED: ICD-10-CM

## 2017-12-28 DIAGNOSIS — Z29.9 ENCOUNTER FOR PROPHYLACTIC MEASURES, UNSPECIFIED: ICD-10-CM

## 2017-12-28 LAB
ALBUMIN SERPL ELPH-MCNC: 3.5 G/DL — SIGNIFICANT CHANGE UP (ref 3.3–5)
ALP SERPL-CCNC: 116 U/L — SIGNIFICANT CHANGE UP (ref 40–120)
ALT FLD-CCNC: 23 U/L — SIGNIFICANT CHANGE UP (ref 12–78)
ANION GAP SERPL CALC-SCNC: 10 MMOL/L — SIGNIFICANT CHANGE UP (ref 5–17)
APPEARANCE UR: CLEAR — SIGNIFICANT CHANGE UP
APTT BLD: 33.4 SEC — SIGNIFICANT CHANGE UP (ref 27.5–37.4)
AST SERPL-CCNC: 35 U/L — SIGNIFICANT CHANGE UP (ref 15–37)
BILIRUB SERPL-MCNC: 0.8 MG/DL — SIGNIFICANT CHANGE UP (ref 0.2–1.2)
BILIRUB UR-MCNC: NEGATIVE — SIGNIFICANT CHANGE UP
BUN SERPL-MCNC: 31 MG/DL — HIGH (ref 7–23)
CALCIUM SERPL-MCNC: 9.6 MG/DL — SIGNIFICANT CHANGE UP (ref 8.5–10.1)
CHLORIDE SERPL-SCNC: 107 MMOL/L — SIGNIFICANT CHANGE UP (ref 96–108)
CO2 SERPL-SCNC: 23 MMOL/L — SIGNIFICANT CHANGE UP (ref 22–31)
COLOR SPEC: YELLOW — SIGNIFICANT CHANGE UP
CREAT SERPL-MCNC: 1 MG/DL — SIGNIFICANT CHANGE UP (ref 0.5–1.3)
DIFF PNL FLD: ABNORMAL
GLUCOSE SERPL-MCNC: 104 MG/DL — HIGH (ref 70–99)
GLUCOSE UR QL: NEGATIVE — SIGNIFICANT CHANGE UP
HCT VFR BLD CALC: 39.5 % — SIGNIFICANT CHANGE UP (ref 34.5–45)
HGB BLD-MCNC: 12.6 G/DL — SIGNIFICANT CHANGE UP (ref 11.5–15.5)
INR BLD: 1.15 RATIO — SIGNIFICANT CHANGE UP (ref 0.88–1.16)
KETONES UR-MCNC: ABNORMAL
LEUKOCYTE ESTERASE UR-ACNC: ABNORMAL
LYMPHOCYTES # BLD AUTO: 3 % — LOW (ref 13–44)
MCHC RBC-ENTMCNC: 30.3 PG — SIGNIFICANT CHANGE UP (ref 27–34)
MCHC RBC-ENTMCNC: 31.8 GM/DL — LOW (ref 32–36)
MCV RBC AUTO: 95.1 FL — SIGNIFICANT CHANGE UP (ref 80–100)
MONOCYTES NFR BLD AUTO: 5 % — SIGNIFICANT CHANGE UP (ref 1–9)
NEUTROPHILS NFR BLD AUTO: 88 % — HIGH (ref 43–77)
NITRITE UR-MCNC: NEGATIVE — SIGNIFICANT CHANGE UP
PH UR: 5 — SIGNIFICANT CHANGE UP (ref 5–8)
PLATELET # BLD AUTO: 247 K/UL — SIGNIFICANT CHANGE UP (ref 150–400)
POTASSIUM SERPL-MCNC: 4.4 MMOL/L — SIGNIFICANT CHANGE UP (ref 3.5–5.3)
POTASSIUM SERPL-SCNC: 4.4 MMOL/L — SIGNIFICANT CHANGE UP (ref 3.5–5.3)
PROT SERPL-MCNC: 9 G/DL — HIGH (ref 6–8.3)
PROT UR-MCNC: 25 MG/DL
PROTHROM AB SERPL-ACNC: 12.6 SEC — SIGNIFICANT CHANGE UP (ref 9.8–12.7)
RBC # BLD: 4.15 M/UL — SIGNIFICANT CHANGE UP (ref 3.8–5.2)
RBC # FLD: 14.1 % — SIGNIFICANT CHANGE UP (ref 10.3–14.5)
SODIUM SERPL-SCNC: 140 MMOL/L — SIGNIFICANT CHANGE UP (ref 135–145)
SP GR SPEC: 1.01 — SIGNIFICANT CHANGE UP (ref 1.01–1.02)
UROBILINOGEN FLD QL: NEGATIVE — SIGNIFICANT CHANGE UP
WBC # BLD: 19.7 K/UL — HIGH (ref 3.8–10.5)
WBC # FLD AUTO: 19.7 K/UL — HIGH (ref 3.8–10.5)

## 2017-12-28 PROCEDURE — 99285 EMERGENCY DEPT VISIT HI MDM: CPT

## 2017-12-28 PROCEDURE — 72192 CT PELVIS W/O DYE: CPT | Mod: 26

## 2017-12-28 PROCEDURE — 71010: CPT | Mod: 26

## 2017-12-28 PROCEDURE — 72128 CT CHEST SPINE W/O DYE: CPT | Mod: 26

## 2017-12-28 PROCEDURE — 72131 CT LUMBAR SPINE W/O DYE: CPT | Mod: 26

## 2017-12-28 PROCEDURE — 70450 CT HEAD/BRAIN W/O DYE: CPT | Mod: 26

## 2017-12-28 PROCEDURE — 93010 ELECTROCARDIOGRAM REPORT: CPT

## 2017-12-28 RX ORDER — AMLODIPINE BESYLATE 2.5 MG/1
10 TABLET ORAL DAILY
Qty: 0 | Refills: 0 | Status: DISCONTINUED | OUTPATIENT
Start: 2017-12-28 | End: 2018-01-02

## 2017-12-28 RX ORDER — ACETAMINOPHEN 500 MG
975 TABLET ORAL ONCE
Qty: 0 | Refills: 0 | Status: COMPLETED | OUTPATIENT
Start: 2017-12-28 | End: 2017-12-28

## 2017-12-28 RX ORDER — LEVOTHYROXINE SODIUM 125 MCG
75 TABLET ORAL DAILY
Qty: 0 | Refills: 0 | Status: DISCONTINUED | OUTPATIENT
Start: 2017-12-28 | End: 2018-01-02

## 2017-12-28 RX ORDER — ATORVASTATIN CALCIUM 80 MG/1
20 TABLET, FILM COATED ORAL AT BEDTIME
Qty: 0 | Refills: 0 | Status: DISCONTINUED | OUTPATIENT
Start: 2017-12-28 | End: 2018-01-02

## 2017-12-28 RX ORDER — ENOXAPARIN SODIUM 100 MG/ML
40 INJECTION SUBCUTANEOUS DAILY
Qty: 0 | Refills: 0 | Status: DISCONTINUED | OUTPATIENT
Start: 2017-12-28 | End: 2018-01-02

## 2017-12-28 RX ORDER — ALLOPURINOL 300 MG
100 TABLET ORAL DAILY
Qty: 0 | Refills: 0 | Status: DISCONTINUED | OUTPATIENT
Start: 2017-12-28 | End: 2018-01-02

## 2017-12-28 RX ORDER — ASPIRIN/CALCIUM CARB/MAGNESIUM 324 MG
81 TABLET ORAL DAILY
Qty: 0 | Refills: 0 | Status: DISCONTINUED | OUTPATIENT
Start: 2017-12-28 | End: 2018-01-02

## 2017-12-28 RX ORDER — VALSARTAN 80 MG/1
80 TABLET ORAL DAILY
Qty: 0 | Refills: 0 | Status: DISCONTINUED | OUTPATIENT
Start: 2017-12-28 | End: 2018-01-02

## 2017-12-28 RX ORDER — PANTOPRAZOLE SODIUM 20 MG/1
40 TABLET, DELAYED RELEASE ORAL
Qty: 0 | Refills: 0 | Status: DISCONTINUED | OUTPATIENT
Start: 2017-12-28 | End: 2018-01-02

## 2017-12-28 RX ORDER — LIDOCAINE 4 G/100G
1 CREAM TOPICAL DAILY
Qty: 0 | Refills: 0 | Status: DISCONTINUED | OUTPATIENT
Start: 2017-12-28 | End: 2018-01-02

## 2017-12-28 RX ORDER — LACTOBACILLUS ACIDOPHILUS 100MM CELL
1 CAPSULE ORAL
Qty: 0 | Refills: 0 | Status: DISCONTINUED | OUTPATIENT
Start: 2017-12-28 | End: 2018-01-02

## 2017-12-28 RX ORDER — OXYCODONE AND ACETAMINOPHEN 5; 325 MG/1; MG/1
1 TABLET ORAL EVERY 6 HOURS
Qty: 0 | Refills: 0 | Status: DISCONTINUED | OUTPATIENT
Start: 2017-12-28 | End: 2018-01-02

## 2017-12-28 RX ADMIN — Medication 975 MILLIGRAM(S): at 14:41

## 2017-12-28 RX ADMIN — OXYCODONE AND ACETAMINOPHEN 1 TABLET(S): 5; 325 TABLET ORAL at 23:28

## 2017-12-28 RX ADMIN — OXYCODONE AND ACETAMINOPHEN 1 TABLET(S): 5; 325 TABLET ORAL at 19:36

## 2017-12-28 RX ADMIN — Medication 975 MILLIGRAM(S): at 10:04

## 2017-12-28 RX ADMIN — ATORVASTATIN CALCIUM 20 MILLIGRAM(S): 80 TABLET, FILM COATED ORAL at 21:48

## 2017-12-28 RX ADMIN — Medication 1 TABLET(S): at 19:36

## 2017-12-28 RX ADMIN — OXYCODONE AND ACETAMINOPHEN 1 TABLET(S): 5; 325 TABLET ORAL at 20:44

## 2017-12-28 NOTE — ED ADULT NURSE REASSESSMENT NOTE - NS ED NURSE REASSESS COMMENT FT1
Patient condition unchanged. No acute distress noted. Pending admission bed assignment. Patient repositioned in stretcher for comfort.

## 2017-12-28 NOTE — PHYSICAL THERAPY INITIAL EVALUATION ADULT - ADDITIONAL COMMENTS
Pt lives with son in a one story private home + 4 stairs with railing to enter. Pt owns a RW for ambulation, and a wheelchair for long distances. As per son at bedside, pt was independent with ADLs prior to admission. Pt owns a shower chair and bedside commode.

## 2017-12-28 NOTE — PHYSICAL THERAPY INITIAL EVALUATION ADULT - PERTINENT HX OF CURRENT PROBLEM, REHAB EVAL
Pt is a 89 y/o female who presents to ED on 12/28 s/p fall 3 days prior. Pt c/o back pain, BLE pain, and difficulty walking. PMHx: anxiety, GERD, retention of urine, agitation, neck pain, HLD, HTN, chronic back pain, CAD, hypothyroidism. B/L Doppler: (-) DVT, X-ray of pelvis: (-) fracture, CT pelvis & L/S: (-) fracture

## 2017-12-28 NOTE — ED ADULT NURSE NOTE - OBJECTIVE STATEMENT
Patient unsure as to why she is here. Reported patient fell 3 days ago and was evaluated here at [place]. Patient reports she is not able to walk from pain. Patient noted with pain in b/L knee. no trauma noted. patient reports she felt trouble urinating and was planning to obtain specimen for urine.

## 2017-12-28 NOTE — CONSULT NOTE ADULT - SUBJECTIVE AND OBJECTIVE BOX
.ANALYSIS/PLAN:  This is a 89-year-old with episode of change in mental status in regards to forgetfulness, back pain, fall.  1.	For  back pain --radiculopathy, most likely secondary to spinal disk disease, spinal stenosis.  I will recommend a trial of Lidoderm patch. ct spine   2.	For history of forgetfulness, change in mental status most likely secondary to underlying dementia.  Secondary to the patient's age, I will recommend supportive therapy.  3.	 appears mechanical   Spoke with daughter at bedside Brii, telephone number [phone].  Explained to her that while in the hospital setting, the patient's cognitive status and mental status may become worse.  She is aware of that.  I will continue to follow. .ANALYSIS/PLAN:  This is a 89-year-old with episode of change in mental status in regards to forgetfulness, back pain, fall.  1.	For  back pain --radiculopathy, most likely secondary to spinal disk disease, spinal stenosis.  I will recommend a trial of Lidoderm patch. ct spine   2.	For history of forgetfulness, change in mental status most likely secondary to underlying dementia.  Secondary to the patient's age, I will recommend supportive therapy.  3.	 appears mechanical -- pt eval

## 2017-12-28 NOTE — H&P ADULT - HISTORY OF PRESENT ILLNESS
89 yo female with hx htn, hld, gerd, chronic back pain bibems due to chronic back pain. As per patient, " my son doesn't know what to do with me anymore." Patient denies any pain at present. PAtient with fall x 3 days ago, but has been ambualtory at home with walker. Patient was in ED yesterday for similar complaint, and was evaluated with labs, xray, sono. Patient was ambulatory with walker in ED yesterday. denies headache or chest pain or abdominal pain,

## 2017-12-28 NOTE — ED ADULT NURSE REASSESSMENT NOTE - NS ED NURSE REASSESS COMMENT FT1
Patient son at bedside and after communicating with son, patient is unable to walk and did not fall 3 days ago. patient has been reporting increasing b/l knee and back pain inhibiting her from ADLs and ambulating.

## 2017-12-28 NOTE — PHYSICAL THERAPY INITIAL EVALUATION ADULT - PHYSICAL ASSIST/NONPHYSICAL ASSIST: SUPINE/SIT, REHAB EVAL
verbal cues/verbal cues for technique and hand placement/1 person assist verbal cues/verbal cues for technique and hand placement/2 person assist

## 2017-12-28 NOTE — PHYSICAL THERAPY INITIAL EVALUATION ADULT - PHYSICAL ASSIST/NONPHYSICAL ASSIST: SCOOT/BRIDGE, REHAB EVAL
verbal cues/verbal cues for technique and hand placement/2 person assist verbal cues/verbal cues for technique and hand placement/1 person assist

## 2017-12-28 NOTE — ED PROVIDER NOTE - OBJECTIVE STATEMENT
89 yo female with hx htn, hld, gerd, chronic back pain bibems due to chronic back pain. As per patient, " my son doesn't know what to do with me anymore." Patient denies any pain at present. PAtient with fall x 3 days ago, but has been ambualtory at home with walker. Patient was in ED yesterday for similar complaint, and was evaluated with labs, xray, sono. Patient was ambulatory with walker in ED yesterday. denies headache or chest pain or abdominal pain,    pmd Arcati

## 2017-12-28 NOTE — PHYSICAL THERAPY INITIAL EVALUATION ADULT - PHYSICAL ASSIST/NONPHYSICAL ASSIST: SIT/SUPINE, REHAB EVAL
verbal cues for technique and hand placement/verbal cues/2 person assist verbal cues/verbal cues for technique and hand placement/1 person assist

## 2017-12-29 LAB
ANION GAP SERPL CALC-SCNC: 7 MMOL/L — SIGNIFICANT CHANGE UP (ref 5–17)
BUN SERPL-MCNC: 34 MG/DL — HIGH (ref 7–23)
CALCIUM SERPL-MCNC: 8.7 MG/DL — SIGNIFICANT CHANGE UP (ref 8.5–10.1)
CHLORIDE SERPL-SCNC: 106 MMOL/L — SIGNIFICANT CHANGE UP (ref 96–108)
CO2 SERPL-SCNC: 27 MMOL/L — SIGNIFICANT CHANGE UP (ref 22–31)
CREAT SERPL-MCNC: 1.1 MG/DL — SIGNIFICANT CHANGE UP (ref 0.5–1.3)
CULTURE RESULTS: NO GROWTH — SIGNIFICANT CHANGE UP
GLUCOSE SERPL-MCNC: 93 MG/DL — SIGNIFICANT CHANGE UP (ref 70–99)
HCT VFR BLD CALC: 35 % — SIGNIFICANT CHANGE UP (ref 34.5–45)
HGB BLD-MCNC: 11 G/DL — LOW (ref 11.5–15.5)
MAGNESIUM SERPL-MCNC: 2 MG/DL — SIGNIFICANT CHANGE UP (ref 1.6–2.6)
MCHC RBC-ENTMCNC: 30.1 PG — SIGNIFICANT CHANGE UP (ref 27–34)
MCHC RBC-ENTMCNC: 31.4 GM/DL — LOW (ref 32–36)
MCV RBC AUTO: 95.9 FL — SIGNIFICANT CHANGE UP (ref 80–100)
PLATELET # BLD AUTO: 220 K/UL — SIGNIFICANT CHANGE UP (ref 150–400)
POTASSIUM SERPL-MCNC: 4.7 MMOL/L — SIGNIFICANT CHANGE UP (ref 3.5–5.3)
POTASSIUM SERPL-SCNC: 4.7 MMOL/L — SIGNIFICANT CHANGE UP (ref 3.5–5.3)
RBC # BLD: 3.65 M/UL — LOW (ref 3.8–5.2)
RBC # FLD: 14.2 % — SIGNIFICANT CHANGE UP (ref 10.3–14.5)
SODIUM SERPL-SCNC: 140 MMOL/L — SIGNIFICANT CHANGE UP (ref 135–145)
SPECIMEN SOURCE: SIGNIFICANT CHANGE UP
WBC # BLD: 12.5 K/UL — HIGH (ref 3.8–10.5)
WBC # FLD AUTO: 12.5 K/UL — HIGH (ref 3.8–10.5)

## 2017-12-29 RX ORDER — HALOPERIDOL DECANOATE 100 MG/ML
2 INJECTION INTRAMUSCULAR ONCE
Qty: 0 | Refills: 0 | Status: COMPLETED | OUTPATIENT
Start: 2017-12-29 | End: 2017-12-29

## 2017-12-29 RX ORDER — OLANZAPINE 15 MG/1
5 TABLET, FILM COATED ORAL EVERY 6 HOURS
Qty: 0 | Refills: 0 | Status: DISCONTINUED | OUTPATIENT
Start: 2017-12-29 | End: 2018-01-01

## 2017-12-29 RX ORDER — RISPERIDONE 4 MG/1
0.5 TABLET ORAL AT BEDTIME
Qty: 0 | Refills: 0 | Status: DISCONTINUED | OUTPATIENT
Start: 2017-12-29 | End: 2018-01-02

## 2017-12-29 RX ADMIN — RISPERIDONE 0.5 MILLIGRAM(S): 4 TABLET ORAL at 21:17

## 2017-12-29 RX ADMIN — Medication 1 TABLET(S): at 12:10

## 2017-12-29 RX ADMIN — ENOXAPARIN SODIUM 40 MILLIGRAM(S): 100 INJECTION SUBCUTANEOUS at 12:14

## 2017-12-29 RX ADMIN — Medication 1 TABLET(S): at 12:14

## 2017-12-29 RX ADMIN — ATORVASTATIN CALCIUM 20 MILLIGRAM(S): 80 TABLET, FILM COATED ORAL at 21:17

## 2017-12-29 RX ADMIN — Medication 1 TABLET(S): at 17:47

## 2017-12-29 RX ADMIN — OXYCODONE AND ACETAMINOPHEN 1 TABLET(S): 5; 325 TABLET ORAL at 12:23

## 2017-12-29 RX ADMIN — OXYCODONE AND ACETAMINOPHEN 1 TABLET(S): 5; 325 TABLET ORAL at 01:59

## 2017-12-29 RX ADMIN — Medication 81 MILLIGRAM(S): at 12:14

## 2017-12-29 RX ADMIN — OXYCODONE AND ACETAMINOPHEN 1 TABLET(S): 5; 325 TABLET ORAL at 17:47

## 2017-12-29 RX ADMIN — OXYCODONE AND ACETAMINOPHEN 1 TABLET(S): 5; 325 TABLET ORAL at 13:30

## 2017-12-29 RX ADMIN — Medication 100 MILLIGRAM(S): at 12:14

## 2017-12-29 RX ADMIN — Medication 75 MICROGRAM(S): at 06:02

## 2017-12-29 RX ADMIN — OLANZAPINE 5 MILLIGRAM(S): 15 TABLET, FILM COATED ORAL at 19:38

## 2017-12-29 RX ADMIN — LIDOCAINE 1 PATCH: 4 CREAM TOPICAL at 12:14

## 2017-12-29 NOTE — PROGRESS NOTE ADULT - SUBJECTIVE AND OBJECTIVE BOX
89yFemale admitted to med/surg with following history:  HPI:  89 yo female with hx htn, hld, gerd, chronic back pain bibems due to chronic back pain. As per patient, " my son doesn't know what to do with me anymore." Patient denies any pain at present. PAtient with fall x 3 days ago, but has been ambualtory at home with walker. Patient was in ED yesterday for similar complaint, and was evaluated with labs, xray, sono. Patient was ambulatory with walker in ED yesterday. denies headache or chest pain or abdominal pain, (28 Dec 2017 16:06)      Psych HPI: Patient seen, evaluated and chart reviewed. Patient has been lucid at this time, but is confused and reportedly became agitated last night. At this time patient is pleasantly confused.    PAST MEDICAL & SURGICAL HISTORY:  Anxiety  GERD (gastroesophageal reflux disease)  Retention of urine  Agitation  Neck pain  Hyperlipidemia, unspecified hyperlipidemia type  Essential hypertension  Chronic back pain  CAD (coronary artery disease)  Cataract: right eye  Hypothyroid  DVT (deep venous thrombosis), right  Pacemaker  S/P coronary artery bypass graft x 1      Allergies    No Known Allergies    Intolerances      MEDICATIONS  (STANDING):  allopurinol 100 milliGRAM(s) Oral daily  amLODIPine   Tablet 10 milliGRAM(s) Oral daily  aspirin  chewable 81 milliGRAM(s) Oral daily  atorvastatin 20 milliGRAM(s) Oral at bedtime  enoxaparin Injectable 40 milliGRAM(s) SubCutaneous daily  lactobacillus acidophilus 1 Tablet(s) Oral three times a day with meals  levothyroxine 75 MICROGram(s) Oral daily  lidocaine   Patch 1 Patch Transdermal daily  multivitamin 1 Tablet(s) Oral daily  oxyCODONE    5 mG/acetaminophen 325 mG 1 Tablet(s) Oral every 6 hours  pantoprazole    Tablet 40 milliGRAM(s) Oral before breakfast  valsartan 80 milliGRAM(s) Oral daily    MEDICATIONS  (PRN):        ROS: Psych: See HPI.  All other systems negative.                          11.0   12.5  )-----------( 220      ( 29 Dec 2017 07:09 )             35.0   29 Dec 2017 07:09    140    |  106    |  34     ----------------------------<  93     4.7     |  27     |  1.10     Ca    8.7        29 Dec 2017 07:09  Mg     2.0       29 Dec 2017 07:09    TPro  9.0    /  Alb  3.5    /  TBili  0.8    /  DBili  x      /  AST  35     /  ALT  23     /  AlkPhos  116    28 Dec 2017 09:38    TSH:     Utox:  Imaging:  Other Tests:    Old Records reviewed:    EXAM:  Vital Signs Last 24 Hrs  T(C): 37 (12-29-17 @ 12:00), Max: 37 (12-29-17 @ 12:00)  T(F): 98.6 (12-29-17 @ 12:00), Max: 98.6 (12-29-17 @ 12:00)  HR: 73 (12-29-17 @ 12:00) (73 - 86)  BP: 145/75 (12-29-17 @ 12:00) (109/56 - 146/78)  BP(mean): --  RR: 18 (12-29-17 @ 12:00) (16 - 18)  SpO2: 93% (12-29-17 @ 12:00) (93% - 98%)  Gen Appearance: Good hygiene and grooming  Gait/Station/Muscle Tone: WNL  Abnl Movements: Absent  Speech: Niormoproductive  TP; WNL  Associations: WNL  TC: WNL  Mood: Somewhat anxious  Affect: Congruent  Consciousness/orientation: Impaired  Memory:   Recent: Impaired Remote: Intact  Attention/Concentration: Impaired  Language: WNL  Fund of Knowledge: Average  Insight: Limited  Judgment: Limited    Suicide Risk Assessment: Patient currently denies suicidal ideation, is future-oriented, and is able to contract for safety      DX: Psychosis NOS, r/o Delirium    REC: Risperdal 0.5 mg po at HS, Zyprexa 5 mg IM q 6 hourly prn - agitation  Observation Status: Regular  Follow-up: As needed

## 2017-12-29 NOTE — PROGRESS NOTE ADULT - ASSESSMENT
Patient is 1) cognitively impaired 2) Does not understand the nature of the medical condition, risks, benefits, alternatives and side-effects of treatment 3) Wants to make decisions voluntarily.  At this time patient has no decision making capacity regarding medical decisions.

## 2017-12-29 NOTE — PROGRESS NOTE ADULT - SUBJECTIVE AND OBJECTIVE BOX
Neurology follow up note    BRII [id number]      Interval History:    Patient with occ back pain     MEDICATIONS    allopurinol 100 milliGRAM(s) Oral daily  amLODIPine   Tablet 10 milliGRAM(s) Oral daily  aspirin  chewable 81 milliGRAM(s) Oral daily  atorvastatin 20 milliGRAM(s) Oral at bedtime  enoxaparin Injectable 40 milliGRAM(s) SubCutaneous daily  lactobacillus acidophilus 1 Tablet(s) Oral three times a day with meals  levothyroxine 75 MICROGram(s) Oral daily  lidocaine   Patch 1 Patch Transdermal daily  multivitamin 1 Tablet(s) Oral daily  OLANZapine Injectable 5 milliGRAM(s) IntraMuscular every 6 hours PRN  oxyCODONE    5 mG/acetaminophen 325 mG 1 Tablet(s) Oral every 6 hours  pantoprazole    Tablet 40 milliGRAM(s) Oral before breakfast  risperiDONE   Tablet 0.5 milliGRAM(s) Oral at bedtime  valsartan 80 milliGRAM(s) Oral daily      Allergies    No Known Allergies    Intolerances            Vital Signs Last 24 Hrs  T(C): 37 (29 Dec 2017 12:00), Max: 37 (29 Dec 2017 12:00)  T(F): 98.6 (29 Dec 2017 12:00), Max: 98.6 (29 Dec 2017 12:00)  HR: 73 (29 Dec 2017 12:00) (73 - 86)  BP: 145/75 (29 Dec 2017 12:00) (109/56 - 146/78)  BP(mean): --  RR: 18 (29 Dec 2017 12:00) (16 - 18)  SpO2: 93% (29 Dec 2017 12:00) (93% - 98%)      REVIEW OF SYSTEMS:     Constitutional: No fever, chills, fatigue, weakness  Eyes: no eye pain, visual disturbances, or discharge  ENT:  No difficulty hearing, tinnitus, vertigo; No sinus or throat pain  Neck: No pain or stiffness  Respiratory: No cough, dyspnea, wheezing   Cardiovascular: No chest pain, palpitations,   Gastrointestinal: No abdominal or epigastric pain. No nausea, vomiting  No diarrhea or constipation.   Genitourinary: No dysuria, frequency, hematuria or incontinence  Neurological: No headaches, lightheadedness, vertigo, numbness or tremors  Psychiatric: No depression, anxiety, mood swings or difficulty sleeping  Musculoskeletal: No joint pain or swelling; No muscle, + on and off back pain   Skin: No itching, burning, rashes or lesions   Lymph Nodes: No enlarged glands  Endocrine: No heat or cold intolerance; No hair loss   Allergy and Immunologic: No hives or eczema    On Neurological Examination:    Mental Status - Patient is alert, awake,       Follow simple commands  Follow complex commands    Speech -   Fluent     Cranial Nerves - Pupils 3 mm equal and reactive to light,   extraocular eye movements intact.   smile symmetric  intact bilateral NLF    Motor Exam -   Right upper 4/5  Left upper 4/5  Right lower 3+/5  Left lower 3+/5      Muscle tone - is normal all over.  No asymmetry is seen.          GENERAL Exam: Nontoxic , No Acute Distress   	  HEENT:  normocephalic, atraumatic  		  LUNGS: Clear bilaterally    	  HEART: Normal S1S2   No murmur RRR        	  GI/ ABDOMEN:  Soft  Non tender    EXTREMITIES:   No Edema  No Clubbing  No Cyanosis No Edema    MUSCULOSKELETAL: decreased Range of Motion all 4 extremities   	   SKIN: Normal  No Ecchymosis               LABS:  CBC Full  -  ( 29 Dec 2017 07:09 )  WBC Count : 12.5 K/uL  Hemoglobin : 11.0 g/dL  Hematocrit : 35.0 %  Platelet Count - Automated : 220 K/uL  Mean Cell Volume : 95.9 fl  Mean Cell Hemoglobin : 30.1 pg  Mean Cell Hemoglobin Concentration : 31.4 gm/dL  Auto Neutrophil # : x  Auto Lymphocyte # : x  Auto Monocyte # : x  Auto Eosinophil # : x  Auto Basophil # : x  Auto Neutrophil % : x  Auto Lymphocyte % : x  Auto Monocyte % : x  Auto Eosinophil % : x  Auto Basophil % : x    Urinalysis Basic - ( 28 Dec 2017 09:23 )    Color: Yellow / Appearance: Clear / S.015 / pH: x  Gluc: x / Ketone: Trace  / Bili: Negative / Urobili: Negative   Blood: x / Protein: 25 mg/dL / Nitrite: Negative   Leuk Esterase: Trace / RBC: 0-2 /HPF / WBC 3-5   Sq Epi: x / Non Sq Epi: Occasional / Bacteria: Negative          140  |  106  |  34<H>  ----------------------------<  93  4.7   |  27  |  1.10    Ca    8.7      29 Dec 2017 07:09  Mg     2.0         TPro  9.0<H>  /  Alb  3.5  /  TBili  0.8  /  DBili  x   /  AST  35  /  ALT  23  /  AlkPhos  116      Hemoglobin A1C:     LIVER FUNCTIONS - ( 28 Dec 2017 09:38 )  Alb: 3.5 g/dL / Pro: 9.0 g/dL / ALK PHOS: 116 U/L / ALT: 23 U/L / AST: 35 U/L / GGT: x           Vitamin B12   PT/INR - ( 28 Dec 2017 09:59 )   PT: 12.6 sec;   INR: 1.15 ratio         PTT - ( 28 Dec 2017 09:59 )  PTT:33.4 sec      RADIOLOGY    ANALYSIS AND PLAN:  A 89-year-old with history of ataxia, frequent falls, change in mental status,  1.	For frequent falls, suspect this could be possibly secondary to age-related changes, deconditioning, history of spinal disk disease.  There is no clear sign on examination to suggest new cerebrovascular accident had ensued.  2.	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  3.	For change in mental status with regards to forgetfulness, suspect this could be possibly mild cognitive impairment versus subtle dementia.   4.	For lumbago, suspect this is secondary to radiculopathy, cervical, thoracic and lumbar in nature and spinal stenosis.  5.	spoke to daughter Brii at [phone]  [place] at [phone].  His voice mail is full, unable to leave message.  6.	rehab placement --- spoke to daughter in detail today     Thank you for the courtesy of consultation.    Physical therapy evaluation as tolerated  OOB to chair/ambulation with assistance only if possible.  Advanced care planning was discussed with family.  Pain is accessed and addressed.  Greater than 45 minutes spent in direct patient care reviewing  the notes, lab data/ imaging , discussion with multidisciplinary team.

## 2017-12-29 NOTE — CHART NOTE - NSCHARTNOTEFT_GEN_A_CORE
called by RN that pt is agitate. Pt seen and examed at bedside. Pt is very agitate, talking loudly and state that she want to go home and does not want to "play your game." Pt know his name, but refused to answer more questions.State she is fine and called by RN that pt is agitate. Pt seen and examed at bedside. Pt is very agitate, talking loudly and state that she want to go home and does not want to "play your game." Pt know his name, but refused to answer more questions initially. order one dose of stat haldol IM for agitation. before the medication was administrated. talked to Pt again, now pt was more calm and agree to have the vitals taken. Spoke again with pt at bedside, answer all questions cooperatively and all the physical exam including neuro check cooperatively. Denies of any chest pain/sob/palpitation/HA/blurry vision. Reported chronic lower back pain and but denies of any sensation loss or weakness. agree to stay on the bed and tried to get some rest.    CONSTITUTIONAL: No weakness, fevers or chills  EYES/ENT: No visual changes;  No vertigo or throat pain ; chronic difficulty of hearing  NECK: No pain or stiffness  RESPIRATORY: No cough, wheezing, hemoptysis; No shortness of breath  CARDIOVASCULAR: No chest pain or palpitations  GASTROINTESTINAL: No abdominal or epigastric pain. No nausea, vomiting, or hematemesis; No diarrhea or constipation. No melena or hematochezia.  GENITOURINARY: No dysuria, frequency or hematuria  NEUROLOGICAL: No numbness or weakness  SKIN: No itching, burning, rashes, or lesions   MSK: chronic pain of knees Lt >Rt and chronic back pain  All other review of systems is negative unless indicated above.    Vital Signs Last 24 Hrs  T(C): 36.8 (29 Dec 2017 05:32), Max: 37.4 (28 Dec 2017 08:53)  T(F): 98.2 (29 Dec 2017 05:32), Max: 99.3 (28 Dec 2017 08:53)  HR: 80 (29 Dec 2017 05:32) (73 - 86)  BP: 126/60 (29 Dec 2017 05:32) (109/56 - 161/76)  BP(mean): --  RR: 17 (29 Dec 2017 05:32) (15 - 18)  SpO2: 96% (29 Dec 2017 05:32) (93% - 98%)      General: thin, initially was agitate and uncooperative then more calm and cooperative  HEENT: NCAT, PERRLA, EOMI bl, moist mucous membranes   Neck: Supple, nontender, no mass  Neurology: A&Ox3, nonfocal, CN II-XII grossly intact, sensation intact, 5/5 of strength of all extremities   Respiratory: CTA B/L, No W/R/R  CV: RRR, +S1/S2, no murmurs, rubs or gallops  Abdominal: Soft, NT, ND +BSx4  Extremities: No C/C/E, + peripheral pulses  MSK: Normal ROM, no joint erythema or warmth, no joint swelling   Skin: warm, dry, normal color, no rash or abnormal lesions    89 yo female with hx htn, hld, gerd, chronic back pain bibems due to chronic back pain was agitate and confused    -neuro check wnl, and CT Head in admission was negative, unlikely to be CVA  -likely due to underline dementia complicated Delirium and anxiety disorder   -continue with monitor for now

## 2017-12-29 NOTE — PROGRESS NOTE ADULT - SUBJECTIVE AND OBJECTIVE BOX
Patient is a 89y old  Female who presents with a chief complaint of "blood in stool when I went to the bathroom, a lot of it" (28 Dec 2017 23:32)      INTERVAL HPI/OVERNIGHT EVENTS: Patient seen and examined. NAD. No complaints.    Vital Signs Last 24 Hrs  T(C): 36.8 (29 Dec 2017 08:00), Max: 36.8 (29 Dec 2017 00:05)  T(F): 98.3 (29 Dec 2017 08:00), Max: 98.3 (29 Dec 2017 00:05)  HR: 73 (29 Dec 2017 11:52) (73 - 86)  BP: 110/61 (29 Dec 2017 11:52) (109/56 - 158/75)  BP(mean): --  RR: 17 (29 Dec 2017 08:00) (15 - 18)  SpO2: 96% (29 Dec 2017 11:52) (93% - 98%)        140  |  106  |  34<H>  ----------------------------<  93  4.7   |  27  |  1.10    Ca    8.7      29 Dec 2017 07:09  Mg     2.0         TPro  9.0<H>  /  Alb  3.5  /  TBili  0.8  /  DBili  x   /  AST  35  /  ALT  23  /  AlkPhos  116                            11.0   12.5  )-----------( 220      ( 29 Dec 2017 07:09 )             35.0     PT/INR - ( 28 Dec 2017 09:59 )   PT: 12.6 sec;   INR: 1.15 ratio         PTT - ( 28 Dec 2017 09:59 )  PTT:33.4 sec  CAPILLARY BLOOD GLUCOSE        Urinalysis Basic - ( 28 Dec 2017 09:23 )    Color: Yellow / Appearance: Clear / S.015 / pH: x  Gluc: x / Ketone: Trace  / Bili: Negative / Urobili: Negative   Blood: x / Protein: 25 mg/dL / Nitrite: Negative   Leuk Esterase: Trace / RBC: 0-2 /HPF / WBC 3-5   Sq Epi: x / Non Sq Epi: Occasional / Bacteria: Negative              allopurinol 100 milliGRAM(s) Oral daily  amLODIPine   Tablet 10 milliGRAM(s) Oral daily  aspirin  chewable 81 milliGRAM(s) Oral daily  atorvastatin 20 milliGRAM(s) Oral at bedtime  enoxaparin Injectable 40 milliGRAM(s) SubCutaneous daily  lactobacillus acidophilus 1 Tablet(s) Oral three times a day with meals  levothyroxine 75 MICROGram(s) Oral daily  lidocaine   Patch 1 Patch Transdermal daily  multivitamin 1 Tablet(s) Oral daily  oxyCODONE    5 mG/acetaminophen 325 mG 1 Tablet(s) Oral every 6 hours  pantoprazole    Tablet 40 milliGRAM(s) Oral before breakfast  valsartan 80 milliGRAM(s) Oral daily            REVIEW OF SYSTEMS:  CONSTITUTIONAL: No fever, no weight loss, or no fatigue  NECK: No pain, no stiffness  RESPIRATORY: No cough, no wheezing, no chills, no hemoptysis, No shortness of breath  CARDIOVASCULAR: No chest pain, no palpitations, no dizziness, no leg swelling  GASTROINTESTINAL: No abdominal pain. No nausea, no vomiting, no hematemesis; No diarrhea, no constipation. No melena, no hematochezia.  GENITOURINARY: No dysuria, no frequency, no hematuria, no incontinence  NEUROLOGICAL: No headaches, no loss of strength, no numbness, no tremors  SKIN: No itching, no burning  MUSCULOSKELETAL: No joint pain, no swelling; No muscle, no back, no extremity pain  PSYCHIATRIC: No depression, no mood swings,   HEME/LYMPH: No easy bruising, no bleeding gums  ALLERY AND IMMUNOLOGIC: No hives       Consultant(s) Notes Reviewed:  [ x] YES  [ ] NO    PHYSICAL EXAM:  GENERAL: NAD  HEAD:  Atraumatic, Normocephalic  EYES: EOMI, PERRLA, conjunctiva and sclera clear  ENMT: No tonsillar erythema, exudates, or enlargement; Moist mucous membranes  NECK: Supple, No JVD  NERVOUS SYSTEM:  Awake & alert  CHEST/LUNG: Clear to auscultation bilaterally; No rales, rhonchi, wheezing,  HEART: Regular rate and rhythm  ABDOMEN: Soft, Nontender, Nondistended; Bowel sounds present  EXTREMITIES:  No clubbing, cyanosis, or edema  LYMPH: No lymphadenopathy noted  SKIN: No rashes      Advanced care planning discussed with patient/family [ x] YES   [ ] NO    Advanced care planning discussed with patient/family. Advanced care planning forms reviewed/discussed/completed. 20 minutes spent.

## 2017-12-30 RX ADMIN — PANTOPRAZOLE SODIUM 40 MILLIGRAM(S): 20 TABLET, DELAYED RELEASE ORAL at 05:39

## 2017-12-30 RX ADMIN — Medication 1 TABLET(S): at 12:37

## 2017-12-30 RX ADMIN — OXYCODONE AND ACETAMINOPHEN 1 TABLET(S): 5; 325 TABLET ORAL at 00:46

## 2017-12-30 RX ADMIN — Medication 1 TABLET(S): at 17:03

## 2017-12-30 RX ADMIN — AMLODIPINE BESYLATE 10 MILLIGRAM(S): 2.5 TABLET ORAL at 05:39

## 2017-12-30 RX ADMIN — ATORVASTATIN CALCIUM 20 MILLIGRAM(S): 80 TABLET, FILM COATED ORAL at 22:00

## 2017-12-30 RX ADMIN — OLANZAPINE 5 MILLIGRAM(S): 15 TABLET, FILM COATED ORAL at 03:27

## 2017-12-30 RX ADMIN — VALSARTAN 80 MILLIGRAM(S): 80 TABLET ORAL at 05:39

## 2017-12-30 RX ADMIN — RISPERIDONE 0.5 MILLIGRAM(S): 4 TABLET ORAL at 22:00

## 2017-12-30 RX ADMIN — OXYCODONE AND ACETAMINOPHEN 1 TABLET(S): 5; 325 TABLET ORAL at 01:19

## 2017-12-30 RX ADMIN — OXYCODONE AND ACETAMINOPHEN 1 TABLET(S): 5; 325 TABLET ORAL at 18:09

## 2017-12-30 RX ADMIN — Medication 1 TABLET(S): at 08:30

## 2017-12-30 RX ADMIN — OLANZAPINE 5 MILLIGRAM(S): 15 TABLET, FILM COATED ORAL at 15:20

## 2017-12-30 RX ADMIN — Medication 1 TABLET(S): at 12:36

## 2017-12-30 RX ADMIN — Medication 100 MILLIGRAM(S): at 12:37

## 2017-12-30 RX ADMIN — OXYCODONE AND ACETAMINOPHEN 1 TABLET(S): 5; 325 TABLET ORAL at 19:09

## 2017-12-30 RX ADMIN — OXYCODONE AND ACETAMINOPHEN 1 TABLET(S): 5; 325 TABLET ORAL at 12:37

## 2017-12-30 RX ADMIN — Medication 81 MILLIGRAM(S): at 12:37

## 2017-12-30 RX ADMIN — OXYCODONE AND ACETAMINOPHEN 1 TABLET(S): 5; 325 TABLET ORAL at 14:26

## 2017-12-30 RX ADMIN — LIDOCAINE 1 PATCH: 4 CREAM TOPICAL at 00:46

## 2017-12-30 RX ADMIN — Medication 75 MICROGRAM(S): at 05:39

## 2017-12-30 RX ADMIN — LIDOCAINE 1 PATCH: 4 CREAM TOPICAL at 12:36

## 2017-12-30 RX ADMIN — ENOXAPARIN SODIUM 40 MILLIGRAM(S): 100 INJECTION SUBCUTANEOUS at 12:36

## 2017-12-30 NOTE — PROGRESS NOTE ADULT - SUBJECTIVE AND OBJECTIVE BOX
Patient is a 89y old  Female who presents with a chief complaint of "blood in stool when I went to the bathroom, a lot of it" (28 Dec 2017 23:32)      INTERVAL HPI/OVERNIGHT EVENTS: Patient seen and examined. NAD. No complaints.    Vital Signs Last 24 Hrs  T(C): 36.7 (30 Dec 2017 16:16), Max: 36.9 (29 Dec 2017 21:14)  T(F): 98 (30 Dec 2017 16:16), Max: 98.5 (29 Dec 2017 21:14)  HR: 79 (30 Dec 2017 16:16) (78 - 93)  BP: 91/53 (30 Dec 2017 16:16) (91/53 - 137/71)  BP(mean): --  RR: 17 (30 Dec 2017 16:16) (16 - 18)  SpO2: 96% (30 Dec 2017 16:16) (88% - 97%)    12-29    140  |  106  |  34<H>  ----------------------------<  93  4.7   |  27  |  1.10    Ca    8.7      29 Dec 2017 07:09  Mg     2.0     12-29                            11.0   12.5  )-----------( 220      ( 29 Dec 2017 07:09 )             35.0       CAPILLARY BLOOD GLUCOSE                  allopurinol 100 milliGRAM(s) Oral daily  amLODIPine   Tablet 10 milliGRAM(s) Oral daily  aspirin  chewable 81 milliGRAM(s) Oral daily  atorvastatin 20 milliGRAM(s) Oral at bedtime  enoxaparin Injectable 40 milliGRAM(s) SubCutaneous daily  lactobacillus acidophilus 1 Tablet(s) Oral three times a day with meals  levothyroxine 75 MICROGram(s) Oral daily  lidocaine   Patch 1 Patch Transdermal daily  multivitamin 1 Tablet(s) Oral daily  OLANZapine Injectable 5 milliGRAM(s) IntraMuscular every 6 hours PRN  oxyCODONE    5 mG/acetaminophen 325 mG 1 Tablet(s) Oral every 6 hours  pantoprazole    Tablet 40 milliGRAM(s) Oral before breakfast  risperiDONE   Tablet 0.5 milliGRAM(s) Oral at bedtime  valsartan 80 milliGRAM(s) Oral daily              REVIEW OF SYSTEMS:  CONSTITUTIONAL: No fever, no weight loss, or no fatigue  NECK: No pain, no stiffness  RESPIRATORY: No cough, no wheezing, no chills, no hemoptysis, No shortness of breath  CARDIOVASCULAR: No chest pain, no palpitations, no dizziness, no leg swelling  GASTROINTESTINAL: No abdominal pain. No nausea, no vomiting, no hematemesis; No diarrhea, no constipation. No melena, no hematochezia.  GENITOURINARY: No dysuria, no frequency, no hematuria, no incontinence  NEUROLOGICAL: No headaches, no loss of strength, no numbness, no tremors  SKIN: No itching, no burning  MUSCULOSKELETAL: No joint pain, no swelling; No muscle, no back, no extremity pain  PSYCHIATRIC: No depression, no mood swings,   HEME/LYMPH: No easy bruising, no bleeding gums  ALLERY AND IMMUNOLOGIC: No hives       Consultant(s) Notes Reviewed:  [ x] YES  [ ] NO    PHYSICAL EXAM:  GENERAL: NAD  HEAD:  Atraumatic, Normocephalic  EYES: EOMI, PERRLA, conjunctiva and sclera clear  ENMT: No tonsillar erythema, exudates, or enlargement; Moist mucous membranes  NECK: Supple, No JVD  NERVOUS SYSTEM:  Awake & alert  CHEST/LUNG: Clear to auscultation bilaterally; No rales, rhonchi, wheezing,  HEART: Regular rate and rhythm  ABDOMEN: Soft, Nontender, Nondistended; Bowel sounds present  EXTREMITIES:  No clubbing, cyanosis, or edema  LYMPH: No lymphadenopathy noted  SKIN: No rashes      Advanced care planning discussed with patient/family [ x] YES   [ ] NO    Advanced care planning discussed with patient/family. Advanced care planning forms reviewed/discussed/completed. 20 minutes spent.

## 2017-12-30 NOTE — PROGRESS NOTE ADULT - SUBJECTIVE AND OBJECTIVE BOX
Neurology follow up note    BRII [id number]      Interval History:    Patient feels ok no new complaints.    MEDICATIONS    allopurinol 100 milliGRAM(s) Oral daily  amLODIPine   Tablet 10 milliGRAM(s) Oral daily  aspirin  chewable 81 milliGRAM(s) Oral daily  atorvastatin 20 milliGRAM(s) Oral at bedtime  enoxaparin Injectable 40 milliGRAM(s) SubCutaneous daily  lactobacillus acidophilus 1 Tablet(s) Oral three times a day with meals  levothyroxine 75 MICROGram(s) Oral daily  lidocaine   Patch 1 Patch Transdermal daily  multivitamin 1 Tablet(s) Oral daily  OLANZapine Injectable 5 milliGRAM(s) IntraMuscular every 6 hours PRN  oxyCODONE    5 mG/acetaminophen 325 mG 1 Tablet(s) Oral every 6 hours  pantoprazole    Tablet 40 milliGRAM(s) Oral before breakfast  risperiDONE   Tablet 0.5 milliGRAM(s) Oral at bedtime  valsartan 80 milliGRAM(s) Oral daily      Allergies    No Known Allergies    Intolerances            Vital Signs Last 24 Hrs  T(C): 36.4 (30 Dec 2017 12:20), Max: 37.2 (29 Dec 2017 15:59)  T(F): 97.6 (30 Dec 2017 12:20), Max: 98.9 (29 Dec 2017 15:59)  HR: 78 (30 Dec 2017 12:20) (71 - 93)  BP: 108/67 (30 Dec 2017 12:20) (108/67 - 137/71)  BP(mean): --  RR: 16 (30 Dec 2017 12:20) (16 - 18)  SpO2: 97% (30 Dec 2017 12:20) (88% - 97%)      REVIEW OF SYSTEMS:     Constitutional: No fever, chills, fatigue, weakness  Eyes: no eye pain, visual disturbances, or discharge  ENT:  No difficulty hearing, tinnitus, vertigo; No sinus or throat pain  Neck: No pain or stiffness  Respiratory: No cough, dyspnea, wheezing   Cardiovascular: No chest pain, palpitations,   Gastrointestinal: No abdominal or epigastric pain. No nausea, vomiting  No diarrhea or constipation.   Genitourinary: No dysuria, frequency, hematuria or incontinence  Neurological: No headaches, lightheadedness, vertigo, numbness or tremors  Psychiatric: No depression, anxiety, mood swings or difficulty sleeping  Musculoskeletal: No joint pain or swelling; No muscle, + on and off back pain   Skin: No itching, burning, rashes or lesions   Lymph Nodes: No enlarged glands  Endocrine: No heat or cold intolerance; No hair loss   Allergy and Immunologic: No hives or eczema    On Neurological Examination:    Mental Status - Patient is alert, awake,   positive confusion       Follow simple commands    Speech -   Fluent     Cranial Nerves - Pupils 3 mm equal and reactive to light,   extraocular eye movements intact.   smile symmetric  intact bilateral NLF    Motor Exam -   Right upper 4/5  Left upper 4/5  Right lower 3+/5  Left lower 3+/5      Muscle tone - is normal all over.  No asymmetry is seen.          GENERAL Exam: Nontoxic , No Acute Distress   	  HEENT:  normocephalic, atraumatic  		  LUNGS: Clear bilaterally    	  HEART: Normal S1S2   No murmur RRR        	  GI/ ABDOMEN:  Soft  Non tender    EXTREMITIES:   No Edema  No Clubbing  No Cyanosis No Edema    MUSCULOSKELETAL: decreased Range of Motion all 4 extremities   	   SKIN: Normal  No Ecchymosis             LABS:  CBC Full  -  ( 29 Dec 2017 07:09 )  WBC Count : 12.5 K/uL  Hemoglobin : 11.0 g/dL  Hematocrit : 35.0 %  Platelet Count - Automated : 220 K/uL  Mean Cell Volume : 95.9 fl  Mean Cell Hemoglobin : 30.1 pg  Mean Cell Hemoglobin Concentration : 31.4 gm/dL  Auto Neutrophil # : x  Auto Lymphocyte # : x  Auto Monocyte # : x  Auto Eosinophil # : x  Auto Basophil # : x  Auto Neutrophil % : x  Auto Lymphocyte % : x  Auto Monocyte % : x  Auto Eosinophil % : x  Auto Basophil % : x      12-29    140  |  106  |  34<H>  ----------------------------<  93  4.7   |  27  |  1.10    Ca    8.7      29 Dec 2017 07:09  Mg     2.0     12-29      Hemoglobin A1C:       Vitamin B12         RADIOLOGY        ANALYSIS AND PLAN:  A 89-year-old with history of ataxia, frequent falls, change in mental status,  1.	For frequent falls, suspect this could be possibly secondary to age-related changes, deconditioning, history of spinal disk disease.  There is no clear sign on examination to suggest new cerebrovascular accident had ensued.  2.	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  3.	For change in mental status with regards to forgetfulness, suspect this could be possibly mild cognitive impairment versus subtle dementia.   4.	For lumbago, suspect this is secondary to radiculopathy, cervical, thoracic and lumbar in nature and spinal stenosis.  5.	spoke to daughter Brii at [phone] 12/29/17  [place] at [phone].  His voice mail is full, unable to leave message.  6.	rehab placement --- spoke to daughter in past  7.	suspect increasing CMS from hospital setting -- psychosis     Thank you for the courtesy of consultation.    Physical therapy evaluation as tolerated  OOB to chair/ambulation with assistance only if possible.  Advanced care planning was discussed with family.  Pain is accessed and addressed.  Greater than 45 minutes spent in direct patient care reviewing  the notes, lab data/ imaging , discussion with multidisciplinary team. Neurology follow up note    BRII [id number]      Interval History:    Patient feels ok no new complaints.    MEDICATIONS    allopurinol 100 milliGRAM(s) Oral daily  amLODIPine   Tablet 10 milliGRAM(s) Oral daily  aspirin  chewable 81 milliGRAM(s) Oral daily  atorvastatin 20 milliGRAM(s) Oral at bedtime  enoxaparin Injectable 40 milliGRAM(s) SubCutaneous daily  lactobacillus acidophilus 1 Tablet(s) Oral three times a day with meals  levothyroxine 75 MICROGram(s) Oral daily  lidocaine   Patch 1 Patch Transdermal daily  multivitamin 1 Tablet(s) Oral daily  OLANZapine Injectable 5 milliGRAM(s) IntraMuscular every 6 hours PRN  oxyCODONE    5 mG/acetaminophen 325 mG 1 Tablet(s) Oral every 6 hours  pantoprazole    Tablet 40 milliGRAM(s) Oral before breakfast  risperiDONE   Tablet 0.5 milliGRAM(s) Oral at bedtime  valsartan 80 milliGRAM(s) Oral daily      Allergies    No Known Allergies    Intolerances            Vital Signs Last 24 Hrs  T(C): 36.4 (30 Dec 2017 12:20), Max: 37.2 (29 Dec 2017 15:59)  T(F): 97.6 (30 Dec 2017 12:20), Max: 98.9 (29 Dec 2017 15:59)  HR: 78 (30 Dec 2017 12:20) (71 - 93)  BP: 108/67 (30 Dec 2017 12:20) (108/67 - 137/71)  BP(mean): --  RR: 16 (30 Dec 2017 12:20) (16 - 18)  SpO2: 97% (30 Dec 2017 12:20) (88% - 97%)      REVIEW OF SYSTEMS:     Constitutional: No fever, chills, fatigue, weakness  Eyes: no eye pain, visual disturbances, or discharge  ENT:  No difficulty hearing, tinnitus, vertigo; No sinus or throat pain  Neck: No pain or stiffness  Respiratory: No cough, dyspnea, wheezing   Cardiovascular: No chest pain, palpitations,   Gastrointestinal: No abdominal or epigastric pain. No nausea, vomiting  No diarrhea or constipation.   Genitourinary: No dysuria, frequency, hematuria or incontinence  Neurological: No headaches, lightheadedness, vertigo, numbness or tremors  Psychiatric: No depression, anxiety, mood swings or difficulty sleeping  Musculoskeletal: No joint pain or swelling; No muscle, + on and off back pain   Skin: No itching, burning, rashes or lesions   Lymph Nodes: No enlarged glands  Endocrine: No heat or cold intolerance; No hair loss   Allergy and Immunologic: No hives or eczema    On Neurological Examination:    Mental Status - Patient is alert, awake,   positive confusion       Follow simple commands    Speech -   Fluent     Cranial Nerves - Pupils 3 mm equal and reactive to light,   extraocular eye movements intact.   smile symmetric  intact bilateral NLF    Motor Exam -   Right upper 4/5  Left upper 4/5  Right lower 3+/5  Left lower 3+/5      Muscle tone - is normal all over.  No asymmetry is seen.          GENERAL Exam: Nontoxic , No Acute Distress   	  HEENT:  normocephalic, atraumatic  		  LUNGS: Clear bilaterally    	  HEART: Normal S1S2   No murmur RRR        	  GI/ ABDOMEN:  Soft  Non tender    EXTREMITIES:   No Edema  No Clubbing  No Cyanosis No Edema    MUSCULOSKELETAL: decreased Range of Motion all 4 extremities   	   SKIN: Normal  No Ecchymosis             LABS:  CBC Full  -  ( 29 Dec 2017 07:09 )  WBC Count : 12.5 K/uL  Hemoglobin : 11.0 g/dL  Hematocrit : 35.0 %  Platelet Count - Automated : 220 K/uL  Mean Cell Volume : 95.9 fl  Mean Cell Hemoglobin : 30.1 pg  Mean Cell Hemoglobin Concentration : 31.4 gm/dL  Auto Neutrophil # : x  Auto Lymphocyte # : x  Auto Monocyte # : x  Auto Eosinophil # : x  Auto Basophil # : x  Auto Neutrophil % : x  Auto Lymphocyte % : x  Auto Monocyte % : x  Auto Eosinophil % : x  Auto Basophil % : x      12-29    140  |  106  |  34<H>  ----------------------------<  93  4.7   |  27  |  1.10    Ca    8.7      29 Dec 2017 07:09  Mg     2.0     12-29      Hemoglobin A1C:       Vitamin B12         RADIOLOGY        ANALYSIS AND PLAN:  A 89-year-old with history of ataxia, frequent falls, change in mental status,  1.	For frequent falls, suspect this could be possibly secondary to age-related changes, deconditioning, history of spinal disk disease.  There is no clear sign on examination to suggest new cerebrovascular accident had ensued.  2.	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  3.	For change in mental status with regards to forgetfulness, suspect this could be possibly mild cognitive impairment versus subtle dementia.   4.	For lumbago, suspect this is secondary to radiculopathy, cervical, thoracic and lumbar in nature and spinal stenosis.  5.	spoke to daughter Brii at [phone] 12/29/17  [place] at [phone]. spoke to son today 12/30/17  6.	rehab placement --- spoke to daughter in past  7.	suspect increasing CMS from hospital setting -- psychosis     Thank you for the courtesy of consultation.    Physical therapy evaluation as tolerated  OOB to chair/ambulation with assistance only if possible.  Advanced care planning was discussed with family.  Pain is accessed and addressed.  Greater than 45 minutes spent in direct patient care reviewing  the notes, lab data/ imaging , discussion with multidisciplinary team.

## 2017-12-31 RX ORDER — SODIUM CHLORIDE 9 MG/ML
500 INJECTION INTRAMUSCULAR; INTRAVENOUS; SUBCUTANEOUS ONCE
Qty: 0 | Refills: 0 | Status: COMPLETED | OUTPATIENT
Start: 2017-12-31 | End: 2017-12-31

## 2017-12-31 RX ADMIN — LIDOCAINE 1 PATCH: 4 CREAM TOPICAL at 11:55

## 2017-12-31 RX ADMIN — Medication 1 TABLET(S): at 08:31

## 2017-12-31 RX ADMIN — PANTOPRAZOLE SODIUM 40 MILLIGRAM(S): 20 TABLET, DELAYED RELEASE ORAL at 05:55

## 2017-12-31 RX ADMIN — Medication 1 TABLET(S): at 11:54

## 2017-12-31 RX ADMIN — OXYCODONE AND ACETAMINOPHEN 1 TABLET(S): 5; 325 TABLET ORAL at 12:07

## 2017-12-31 RX ADMIN — Medication 100 MILLIGRAM(S): at 11:54

## 2017-12-31 RX ADMIN — OXYCODONE AND ACETAMINOPHEN 1 TABLET(S): 5; 325 TABLET ORAL at 05:54

## 2017-12-31 RX ADMIN — Medication 75 MICROGRAM(S): at 05:55

## 2017-12-31 RX ADMIN — OXYCODONE AND ACETAMINOPHEN 1 TABLET(S): 5; 325 TABLET ORAL at 18:41

## 2017-12-31 RX ADMIN — Medication 1 TABLET(S): at 17:22

## 2017-12-31 RX ADMIN — Medication 81 MILLIGRAM(S): at 11:54

## 2017-12-31 RX ADMIN — OLANZAPINE 5 MILLIGRAM(S): 15 TABLET, FILM COATED ORAL at 16:35

## 2017-12-31 RX ADMIN — RISPERIDONE 0.5 MILLIGRAM(S): 4 TABLET ORAL at 21:38

## 2017-12-31 RX ADMIN — LIDOCAINE 1 PATCH: 4 CREAM TOPICAL at 00:05

## 2017-12-31 RX ADMIN — ENOXAPARIN SODIUM 40 MILLIGRAM(S): 100 INJECTION SUBCUTANEOUS at 11:56

## 2017-12-31 RX ADMIN — OXYCODONE AND ACETAMINOPHEN 1 TABLET(S): 5; 325 TABLET ORAL at 14:09

## 2017-12-31 RX ADMIN — ATORVASTATIN CALCIUM 20 MILLIGRAM(S): 80 TABLET, FILM COATED ORAL at 21:38

## 2017-12-31 RX ADMIN — OXYCODONE AND ACETAMINOPHEN 1 TABLET(S): 5; 325 TABLET ORAL at 06:57

## 2017-12-31 RX ADMIN — SODIUM CHLORIDE 1000 MILLILITER(S): 9 INJECTION INTRAMUSCULAR; INTRAVENOUS; SUBCUTANEOUS at 00:23

## 2017-12-31 NOTE — PROGRESS NOTE ADULT - SUBJECTIVE AND OBJECTIVE BOX
Patient is a 89y old  Female who presents with a chief complaint of "blood in stool when I went to the bathroom, a lot of it" (28 Dec 2017 23:32)      INTERVAL HPI/OVERNIGHT EVENTS: Patient seen and examined. NAD. No complaints.    Vital Signs Last 24 Hrs  T(C): 36.8 (31 Dec 2017 12:00), Max: 36.8 (31 Dec 2017 12:00)  T(F): 98.2 (31 Dec 2017 12:00), Max: 98.2 (31 Dec 2017 12:00)  HR: 79 (31 Dec 2017 12:00) (63 - 79)  BP: 116/58 (31 Dec 2017 12:00) (90/40 - 116/58)  BP(mean): --  RR: 16 (31 Dec 2017 12:00) (16 - 17)  SpO2: 95% (31 Dec 2017 12:00) (92% - 97%)              CAPILLARY BLOOD GLUCOSE                  allopurinol 100 milliGRAM(s) Oral daily  amLODIPine   Tablet 10 milliGRAM(s) Oral daily  aspirin  chewable 81 milliGRAM(s) Oral daily  atorvastatin 20 milliGRAM(s) Oral at bedtime  enoxaparin Injectable 40 milliGRAM(s) SubCutaneous daily  lactobacillus acidophilus 1 Tablet(s) Oral three times a day with meals  levothyroxine 75 MICROGram(s) Oral daily  lidocaine   Patch 1 Patch Transdermal daily  multivitamin 1 Tablet(s) Oral daily  OLANZapine Injectable 5 milliGRAM(s) IntraMuscular every 6 hours PRN  oxyCODONE    5 mG/acetaminophen 325 mG 1 Tablet(s) Oral every 6 hours  pantoprazole    Tablet 40 milliGRAM(s) Oral before breakfast  risperiDONE   Tablet 0.5 milliGRAM(s) Oral at bedtime  valsartan 80 milliGRAM(s) Oral daily          REVIEW OF SYSTEMS:  CONSTITUTIONAL: No fever, no weight loss, or no fatigue  NECK: No pain, no stiffness  RESPIRATORY: No cough, no wheezing, no chills, no hemoptysis, No shortness of breath  CARDIOVASCULAR: No chest pain, no palpitations, no dizziness, no leg swelling  GASTROINTESTINAL: No abdominal pain. No nausea, no vomiting, no hematemesis; No diarrhea, no constipation. No melena, no hematochezia.  GENITOURINARY: No dysuria, no frequency, no hematuria, no incontinence  NEUROLOGICAL: No headaches, no loss of strength, no numbness, no tremors  SKIN: No itching, no burning  MUSCULOSKELETAL: No joint pain, no swelling; No muscle, no back, no extremity pain  PSYCHIATRIC: No depression, no mood swings,   HEME/LYMPH: No easy bruising, no bleeding gums  ALLERY AND IMMUNOLOGIC: No hives       Consultant(s) Notes Reviewed:  [ x] YES  [ ] NO    PHYSICAL EXAM:  GENERAL: NAD  HEAD:  Atraumatic, Normocephalic  EYES: EOMI, PERRLA, conjunctiva and sclera clear  ENMT: No tonsillar erythema, exudates, or enlargement; Moist mucous membranes  NECK: Supple, No JVD  NERVOUS SYSTEM:  Awake & alert  CHEST/LUNG: Clear to auscultation bilaterally; No rales, rhonchi, wheezing,  HEART: Regular rate and rhythm  ABDOMEN: Soft, Nontender, Nondistended; Bowel sounds present  EXTREMITIES:  No clubbing, cyanosis, or edema  LYMPH: No lymphadenopathy noted  SKIN: No rashes      Advanced care planning discussed with patient/family [ x] YES   [ ] NO    Advanced care planning discussed with patient/family. Advanced care planning forms reviewed/discussed/completed. 20 minutes spent.

## 2017-12-31 NOTE — PROGRESS NOTE ADULT - SUBJECTIVE AND OBJECTIVE BOX
Neurology follow up note    BRII [id number]      Interval History:    Patient feels ok no new complaints.    MEDICATIONS    allopurinol 100 milliGRAM(s) Oral daily  amLODIPine   Tablet 10 milliGRAM(s) Oral daily  aspirin  chewable 81 milliGRAM(s) Oral daily  atorvastatin 20 milliGRAM(s) Oral at bedtime  enoxaparin Injectable 40 milliGRAM(s) SubCutaneous daily  lactobacillus acidophilus 1 Tablet(s) Oral three times a day with meals  levothyroxine 75 MICROGram(s) Oral daily  lidocaine   Patch 1 Patch Transdermal daily  multivitamin 1 Tablet(s) Oral daily  OLANZapine Injectable 5 milliGRAM(s) IntraMuscular every 6 hours PRN  oxyCODONE    5 mG/acetaminophen 325 mG 1 Tablet(s) Oral every 6 hours  pantoprazole    Tablet 40 milliGRAM(s) Oral before breakfast  risperiDONE   Tablet 0.5 milliGRAM(s) Oral at bedtime  valsartan 80 milliGRAM(s) Oral daily      Allergies    No Known Allergies    Intolerances            Vital Signs Last 24 Hrs  T(C): 36.8 (31 Dec 2017 12:00), Max: 36.8 (31 Dec 2017 12:00)  T(F): 98.2 (31 Dec 2017 12:00), Max: 98.2 (31 Dec 2017 12:00)  HR: 79 (31 Dec 2017 12:00) (63 - 79)  BP: 116/58 (31 Dec 2017 12:00) (90/40 - 116/58)  BP(mean): --  RR: 16 (31 Dec 2017 12:00) (16 - 17)  SpO2: 95% (31 Dec 2017 12:00) (92% - 97%)    REVIEW OF SYSTEMS:     Constitutional: No fever, chills, fatigue, weakness  Eyes: no eye pain, visual disturbances, or discharge  ENT:  No difficulty hearing, tinnitus, vertigo; No sinus or throat pain  Neck: No pain or stiffness  Respiratory: No cough, dyspnea, wheezing   Cardiovascular: No chest pain, palpitations,   Gastrointestinal: No abdominal or epigastric pain. No nausea, vomiting  No diarrhea or constipation.   Genitourinary: No dysuria, frequency, hematuria or incontinence  Neurological: No headaches, lightheadedness, vertigo, numbness or tremors  Psychiatric: No depression, anxiety, mood swings or difficulty sleeping  Musculoskeletal: No joint pain or swelling; No muscle, + on and off back pain   Skin: No itching, burning, rashes or lesions   Lymph Nodes: No enlarged glands  Endocrine: No heat or cold intolerance; No hair loss   Allergy and Immunologic: No hives or eczema    On Neurological Examination:    Mental Status - Patient is alert, awake,   positive confusion       Follow simple commands    Speech -   Fluent     Cranial Nerves - Pupils 3 mm equal and reactive to light,   extraocular eye movements intact.   smile symmetric  intact bilateral NLF    Motor Exam -   Right upper 4/5  Left upper 4/5  Right lower 3+/5  Left lower 3+/5      Muscle tone - is normal all over.  No asymmetry is seen.          GENERAL Exam: Nontoxic , No Acute Distress   	  HEENT:  normocephalic, atraumatic  		  LUNGS: Clear bilaterally    	  HEART: Normal S1S2   No murmur RRR        	  GI/ ABDOMEN:  Soft  Non tender    EXTREMITIES:   No Edema  No Clubbing  No Cyanosis No Edema    MUSCULOSKELETAL: decreased Range of Motion all 4 extremities   	   SKIN: Normal  No Ecchymosis                  LABS:            Hemoglobin A1C:       Vitamin B12         RADIOLOGY    ANALYSIS AND PLAN:  A 89-year-old with history of ataxia, frequent falls, change in mental status,  For frequent falls, suspect this could be possibly secondary to age-related changes, deconditioning, history of spinal disk disease.  There is no clear sign on examination to suggest new cerebrovascular accident had ensued.  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  For change in mental status with regards to forgetfulness, suspect this could be possibly mild cognitive impairment versus subtle dementia.   For lumbago, suspect this is secondary to radiculopathy, cervical, thoracic and lumbar in nature and spinal stenosis.  spoke to daughter Brii at [phone] 12/29/17  [place] at [phone]. spoke to son yesterday 12/30/17  rehab placement --- spoke to daughter in past  suspect increasing CMS from hospital setting -- psychosis   no new events     Thank you for the courtesy of consultation.    Physical therapy evaluation as tolerated  OOB to chair/ambulation with assistance only if possible.  Advanced care planning was discussed with family.  Pain is accessed and addressed.  Greater than 40 minutes spent in direct patient care reviewing  the notes, lab data/ imaging , discussion with multidisciplinary team.

## 2018-01-01 RX ORDER — OLANZAPINE 15 MG/1
2.5 TABLET, FILM COATED ORAL EVERY 6 HOURS
Qty: 0 | Refills: 0 | Status: DISCONTINUED | OUTPATIENT
Start: 2018-01-01 | End: 2018-01-02

## 2018-01-01 RX ADMIN — Medication 100 MILLIGRAM(S): at 12:27

## 2018-01-01 RX ADMIN — AMLODIPINE BESYLATE 10 MILLIGRAM(S): 2.5 TABLET ORAL at 06:20

## 2018-01-01 RX ADMIN — LIDOCAINE 1 PATCH: 4 CREAM TOPICAL at 12:27

## 2018-01-01 RX ADMIN — OXYCODONE AND ACETAMINOPHEN 1 TABLET(S): 5; 325 TABLET ORAL at 06:20

## 2018-01-01 RX ADMIN — Medication 81 MILLIGRAM(S): at 12:27

## 2018-01-01 RX ADMIN — OXYCODONE AND ACETAMINOPHEN 1 TABLET(S): 5; 325 TABLET ORAL at 00:12

## 2018-01-01 RX ADMIN — OXYCODONE AND ACETAMINOPHEN 1 TABLET(S): 5; 325 TABLET ORAL at 07:23

## 2018-01-01 RX ADMIN — Medication 1 TABLET(S): at 18:05

## 2018-01-01 RX ADMIN — LIDOCAINE 1 PATCH: 4 CREAM TOPICAL at 00:01

## 2018-01-01 RX ADMIN — Medication 1 TABLET(S): at 12:27

## 2018-01-01 RX ADMIN — RISPERIDONE 0.5 MILLIGRAM(S): 4 TABLET ORAL at 21:51

## 2018-01-01 RX ADMIN — Medication 75 MICROGRAM(S): at 06:20

## 2018-01-01 RX ADMIN — Medication 1 TABLET(S): at 10:02

## 2018-01-01 RX ADMIN — ATORVASTATIN CALCIUM 20 MILLIGRAM(S): 80 TABLET, FILM COATED ORAL at 21:51

## 2018-01-01 RX ADMIN — ENOXAPARIN SODIUM 40 MILLIGRAM(S): 100 INJECTION SUBCUTANEOUS at 12:27

## 2018-01-01 RX ADMIN — OXYCODONE AND ACETAMINOPHEN 1 TABLET(S): 5; 325 TABLET ORAL at 00:57

## 2018-01-01 RX ADMIN — OXYCODONE AND ACETAMINOPHEN 1 TABLET(S): 5; 325 TABLET ORAL at 18:05

## 2018-01-01 RX ADMIN — PANTOPRAZOLE SODIUM 40 MILLIGRAM(S): 20 TABLET, DELAYED RELEASE ORAL at 06:20

## 2018-01-01 RX ADMIN — VALSARTAN 80 MILLIGRAM(S): 80 TABLET ORAL at 06:20

## 2018-01-01 NOTE — PROGRESS NOTE ADULT - PROBLEM SELECTOR PLAN 1
PT eval  Neuro f/u  D/C planning to [facility]  no evidence of CVA  Continue asa
PT eval  Neuro f/u  D/C planning to [facility]  no evidence of CVA  Continue asa
PT eval  Neuro f/u  D/C planning to [facility]  no evidence of CVA  Continue asa
PT eval  Neuro f/u  D/C planning to [facility]  no evidence of CVA  Continue asa

## 2018-01-01 NOTE — PROGRESS NOTE ADULT - PROBLEM SELECTOR PLAN 3
improved  no signs of infection  trend cbc

## 2018-01-01 NOTE — PROGRESS NOTE ADULT - PROBLEM SELECTOR PLAN 4
Continue valsartan and amlodipine

## 2018-01-01 NOTE — PROGRESS NOTE ADULT - SUBJECTIVE AND OBJECTIVE BOX
Vital Signs Last 24 Hrs  T(C): 36.4 (01 Jan 2018 05:11), Max: 36.5 (31 Dec 2017 21:41)  T(F): 97.5 (01 Jan 2018 05:11), Max: 97.7 (31 Dec 2017 21:41)  HR: 75 (01 Jan 2018 05:11) (73 - 85)  BP: 164/72 (01 Jan 2018 05:11) (135/58 - 164/72)  BP(mean): --  RR: 16 (01 Jan 2018 05:11) (16 - 16)  SpO2: 95% (01 Jan 2018 05:11) (95% - 97%)              CAPILLARY BLOOD GLUCOSE                  allopurinol 100 milliGRAM(s) Oral daily  amLODIPine   Tablet 10 milliGRAM(s) Oral daily  aspirin  chewable 81 milliGRAM(s) Oral daily  atorvastatin 20 milliGRAM(s) Oral at bedtime  enoxaparin Injectable 40 milliGRAM(s) SubCutaneous daily  lactobacillus acidophilus 1 Tablet(s) Oral three times a day with meals  levothyroxine 75 MICROGram(s) Oral daily  lidocaine   Patch 1 Patch Transdermal daily  multivitamin 1 Tablet(s) Oral daily  OLANZapine Injectable 5 milliGRAM(s) IntraMuscular every 6 hours PRN  oxyCODONE    5 mG/acetaminophen 325 mG 1 Tablet(s) Oral every 6 hours  pantoprazole    Tablet 40 milliGRAM(s) Oral before breakfast  risperiDONE   Tablet 0.5 milliGRAM(s) Oral at bedtime  valsartan 80 milliGRAM(s) Oral daily  Patient is a 89y old  Female who presents with a chief complaint of "blood in stool when I went to the bathroom, a lot of it" (28 Dec 2017 23:32)      INTERVAL HPI/OVERNIGHT EVENTS: Patient seen and examined. NAD. No complaints.          REVIEW OF SYSTEMS:  CONSTITUTIONAL: No fever, no weight loss, or no fatigue  NECK: No pain, no stiffness  RESPIRATORY: No cough, no wheezing, no chills, no hemoptysis, No shortness of breath  CARDIOVASCULAR: No chest pain, no palpitations, no dizziness, no leg swelling  GASTROINTESTINAL: No abdominal pain. No nausea, no vomiting, no hematemesis; No diarrhea, no constipation. No melena, no hematochezia.  GENITOURINARY: No dysuria, no frequency, no hematuria, no incontinence  NEUROLOGICAL: No headaches, no loss of strength, no numbness, no tremors  SKIN: No itching, no burning  MUSCULOSKELETAL: No joint pain, no swelling; No muscle, no back, no extremity pain  PSYCHIATRIC: No depression, no mood swings,   HEME/LYMPH: No easy bruising, no bleeding gums  ALLERY AND IMMUNOLOGIC: No hives       Consultant(s) Notes Reviewed:  [ x] YES  [ ] NO    PHYSICAL EXAM:  GENERAL: NAD  HEAD:  Atraumatic, Normocephalic  EYES: EOMI, PERRLA, conjunctiva and sclera clear  ENMT: No tonsillar erythema, exudates, or enlargement; Moist mucous membranes  NECK: Supple, No JVD  NERVOUS SYSTEM:  Awake & alert  CHEST/LUNG: Clear to auscultation bilaterally; No rales, rhonchi, wheezing,  HEART: Regular rate and rhythm  ABDOMEN: Soft, Nontender, Nondistended; Bowel sounds present  EXTREMITIES:  No clubbing, cyanosis, or edema  LYMPH: No lymphadenopathy noted  SKIN: No rashes      Advanced care planning discussed with patient/family [ x] YES   [ ] NO    Advanced care planning discussed with patient/family. Advanced care planning forms reviewed/discussed/completed. 20 minutes spent.

## 2018-01-01 NOTE — PROGRESS NOTE ADULT - PROBLEM SELECTOR PLAN 2
percocet/tylenol prn  neuro f/u

## 2018-01-01 NOTE — PROGRESS NOTE ADULT - SUBJECTIVE AND OBJECTIVE BOX
Neurology follow up note    BRII [id number]      Interval History:    Patient feels ok no new complaints.    MEDICATIONS    allopurinol 100 milliGRAM(s) Oral daily  amLODIPine   Tablet 10 milliGRAM(s) Oral daily  aspirin  chewable 81 milliGRAM(s) Oral daily  atorvastatin 20 milliGRAM(s) Oral at bedtime  enoxaparin Injectable 40 milliGRAM(s) SubCutaneous daily  lactobacillus acidophilus 1 Tablet(s) Oral three times a day with meals  levothyroxine 75 MICROGram(s) Oral daily  lidocaine   Patch 1 Patch Transdermal daily  multivitamin 1 Tablet(s) Oral daily  OLANZapine 2.5 milliGRAM(s) Oral every 6 hours PRN  oxyCODONE    5 mG/acetaminophen 325 mG 1 Tablet(s) Oral every 6 hours  pantoprazole    Tablet 40 milliGRAM(s) Oral before breakfast  risperiDONE   Tablet 0.5 milliGRAM(s) Oral at bedtime  valsartan 80 milliGRAM(s) Oral daily      Allergies    No Known Allergies    Intolerances            Vital Signs Last 24 Hrs  T(C): 36.6 (01 Jan 2018 13:54), Max: 36.6 (01 Jan 2018 09:05)  T(F): 97.8 (01 Jan 2018 13:54), Max: 97.8 (01 Jan 2018 09:05)  HR: 78 (01 Jan 2018 13:54) (73 - 85)  BP: 114/60 (01 Jan 2018 13:54) (105/51 - 164/72)  BP(mean): --  RR: 19 (01 Jan 2018 13:54) (16 - 20)  SpO2: 94% (01 Jan 2018 13:54) (94% - 97%)    REVIEW OF SYSTEMS:     Constitutional: No fever, chills, fatigue, weakness  Eyes: no eye pain, visual disturbances, or discharge  ENT:  No difficulty hearing, tinnitus, vertigo; No sinus or throat pain  Neck: No pain or stiffness  Respiratory: No cough, dyspnea, wheezing   Cardiovascular: No chest pain, palpitations,   Gastrointestinal: No abdominal or epigastric pain. No nausea, vomiting  No diarrhea or constipation.   Genitourinary: No dysuria, frequency, hematuria or incontinence  Neurological: No headaches, lightheadedness, vertigo, numbness or tremors  Psychiatric: No depression, anxiety, mood swings or difficulty sleeping  Musculoskeletal: No joint pain or swelling; No muscle, + on and off back pain   Skin: No itching, burning, rashes or lesions   Lymph Nodes: No enlarged glands  Endocrine: No heat or cold intolerance; No hair loss   Allergy and Immunologic: No hives or eczema    On Neurological Examination:    Mental Status - Patient is alert, awake,   positive confusion       Follow simple commands    Speech -   Fluent     Cranial Nerves - Pupils 3 mm equal and reactive to light,   extraocular eye movements intact.   smile symmetric  intact bilateral NLF    Motor Exam -   Right upper 4/5  Left upper 4/5  Right lower 3+/5  Left lower 3+/5      Muscle tone - is normal all over.  No asymmetry is seen.          GENERAL Exam: Nontoxic , No Acute Distress   	  HEENT:  normocephalic, atraumatic  		  LUNGS: Clear bilaterally    	  HEART: Normal S1S2   No murmur RRR        	  GI/ ABDOMEN:  Soft  Non tender    EXTREMITIES:   No Edema  No Clubbing  No Cyanosis No Edema    MUSCULOSKELETAL: decreased Range of Motion all 4 extremities   	   SKIN: Normal  No Ecchymosis               LABS:            Hemoglobin A1C:       Vitamin B12         RADIOLOGY    ANALYSIS AND PLAN:  A 89-year-old with history of ataxia, frequent falls, change in mental status,  1.	For frequent falls, suspect this could be possibly secondary to age-related changes, deconditioning, history of spinal disk disease.  There is no clear sign on examination to suggest new cerebrovascular accident had ensued.  2.	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  3.	For change in mental status with regards to forgetfulness, suspect this could be possibly mild cognitive impairment versus subtle dementia.   4.	For lumbago, suspect this is secondary to radiculopathy, cervical, thoracic and lumbar in nature and spinal stenosis.  5.	spoke to daughter Brii at [phone] 12/29/17  [place] at [phone]. spoke to son yesterday 12/30/17 no response today 1/1/18  6.	rehab placement --- spoke to daughter in past  7.	suspect increasing CMS from hospital setting -- psychosis   8.	no new events     Thank you for the courtesy of consultation.    Physical therapy evaluation as tolerated  OOB to chair/ambulation with assistance only if possible.  Advanced care planning was discussed with family.  Pain is accessed and addressed.  Greater than 35 minutes spent in direct patient care reviewing  the notes, lab data/ imaging , discussion with multidisciplinary team.

## 2018-01-02 ENCOUNTER — TRANSCRIPTION ENCOUNTER (OUTPATIENT)
Age: 83
End: 2018-01-02

## 2018-01-02 VITALS — TEMPERATURE: 99 F

## 2018-01-02 PROCEDURE — 80053 COMPREHEN METABOLIC PANEL: CPT

## 2018-01-02 PROCEDURE — 36415 COLL VENOUS BLD VENIPUNCTURE: CPT

## 2018-01-02 PROCEDURE — 72192 CT PELVIS W/O DYE: CPT

## 2018-01-02 PROCEDURE — 70450 CT HEAD/BRAIN W/O DYE: CPT

## 2018-01-02 PROCEDURE — 72128 CT CHEST SPINE W/O DYE: CPT

## 2018-01-02 PROCEDURE — 83735 ASSAY OF MAGNESIUM: CPT

## 2018-01-02 PROCEDURE — 87086 URINE CULTURE/COLONY COUNT: CPT

## 2018-01-02 PROCEDURE — 97162 PT EVAL MOD COMPLEX 30 MIN: CPT

## 2018-01-02 PROCEDURE — 97110 THERAPEUTIC EXERCISES: CPT

## 2018-01-02 PROCEDURE — 93970 EXTREMITY STUDY: CPT

## 2018-01-02 PROCEDURE — 99284 EMERGENCY DEPT VISIT MOD MDM: CPT | Mod: 25

## 2018-01-02 PROCEDURE — 93005 ELECTROCARDIOGRAM TRACING: CPT

## 2018-01-02 PROCEDURE — 85610 PROTHROMBIN TIME: CPT

## 2018-01-02 PROCEDURE — 80048 BASIC METABOLIC PNL TOTAL CA: CPT

## 2018-01-02 PROCEDURE — 72131 CT LUMBAR SPINE W/O DYE: CPT

## 2018-01-02 PROCEDURE — 85027 COMPLETE CBC AUTOMATED: CPT

## 2018-01-02 PROCEDURE — 81001 URINALYSIS AUTO W/SCOPE: CPT

## 2018-01-02 PROCEDURE — 82550 ASSAY OF CK (CPK): CPT

## 2018-01-02 PROCEDURE — 85730 THROMBOPLASTIN TIME PARTIAL: CPT

## 2018-01-02 PROCEDURE — 72170 X-RAY EXAM OF PELVIS: CPT

## 2018-01-02 PROCEDURE — 71045 X-RAY EXAM CHEST 1 VIEW: CPT

## 2018-01-02 PROCEDURE — 97530 THERAPEUTIC ACTIVITIES: CPT

## 2018-01-02 PROCEDURE — 99285 EMERGENCY DEPT VISIT HI MDM: CPT | Mod: 25

## 2018-01-02 RX ORDER — LEVOTHYROXINE SODIUM 125 MCG
1 TABLET ORAL
Qty: 0 | Refills: 0 | COMMUNITY

## 2018-01-02 RX ORDER — LEVOTHYROXINE SODIUM 125 MCG
1 TABLET ORAL
Qty: 0 | Refills: 0 | DISCHARGE
Start: 2018-01-02

## 2018-01-02 RX ORDER — LACTOBACILLUS ACIDOPHILUS 100MM CELL
1 CAPSULE ORAL
Qty: 0 | Refills: 0 | DISCHARGE
Start: 2018-01-02

## 2018-01-02 RX ORDER — RISPERIDONE 4 MG/1
1 TABLET ORAL
Qty: 0 | Refills: 0 | DISCHARGE
Start: 2018-01-02

## 2018-01-02 RX ORDER — LIDOCAINE 4 G/100G
1 CREAM TOPICAL
Qty: 0 | Refills: 0 | DISCHARGE
Start: 2018-01-02

## 2018-01-02 RX ADMIN — OXYCODONE AND ACETAMINOPHEN 1 TABLET(S): 5; 325 TABLET ORAL at 16:56

## 2018-01-02 RX ADMIN — OXYCODONE AND ACETAMINOPHEN 1 TABLET(S): 5; 325 TABLET ORAL at 05:20

## 2018-01-02 RX ADMIN — OXYCODONE AND ACETAMINOPHEN 1 TABLET(S): 5; 325 TABLET ORAL at 05:00

## 2018-01-02 RX ADMIN — PANTOPRAZOLE SODIUM 40 MILLIGRAM(S): 20 TABLET, DELAYED RELEASE ORAL at 05:00

## 2018-01-02 RX ADMIN — Medication 1 TABLET(S): at 08:08

## 2018-01-02 RX ADMIN — AMLODIPINE BESYLATE 10 MILLIGRAM(S): 2.5 TABLET ORAL at 05:00

## 2018-01-02 RX ADMIN — VALSARTAN 80 MILLIGRAM(S): 80 TABLET ORAL at 05:00

## 2018-01-02 RX ADMIN — Medication 75 MICROGRAM(S): at 05:00

## 2018-01-02 RX ADMIN — Medication 1 TABLET(S): at 16:57

## 2018-01-02 RX ADMIN — LIDOCAINE 1 PATCH: 4 CREAM TOPICAL at 00:30

## 2018-01-02 NOTE — DISCHARGE NOTE ADULT - PLAN OF CARE
. Continue current medications  Follow-up with your primary care doctor within 1 week. supportive care

## 2018-01-02 NOTE — PROGRESS NOTE ADULT - SUBJECTIVE AND OBJECTIVE BOX
Neurology follow up note    BRII [id number]      Interval History:    Patient feels ok     MEDICATIONS    allopurinol 100 milliGRAM(s) Oral daily  amLODIPine   Tablet 10 milliGRAM(s) Oral daily  aspirin  chewable 81 milliGRAM(s) Oral daily  atorvastatin 20 milliGRAM(s) Oral at bedtime  enoxaparin Injectable 40 milliGRAM(s) SubCutaneous daily  lactobacillus acidophilus 1 Tablet(s) Oral three times a day with meals  levothyroxine 75 MICROGram(s) Oral daily  lidocaine   Patch 1 Patch Transdermal daily  multivitamin 1 Tablet(s) Oral daily  oxyCODONE    5 mG/acetaminophen 325 mG 1 Tablet(s) Oral every 6 hours  pantoprazole    Tablet 40 milliGRAM(s) Oral before breakfast  risperiDONE   Tablet 0.5 milliGRAM(s) Oral at bedtime  valsartan 80 milliGRAM(s) Oral daily      Allergies    No Known Allergies    Intolerances            Vital Signs Last 24 Hrs  T(C): 36.4 (02 Jan 2018 07:14), Max: 37 (01 Jan 2018 16:51)  T(F): 97.6 (02 Jan 2018 07:14), Max: 98.6 (01 Jan 2018 16:51)  HR: 82 (02 Jan 2018 07:14) (80 - 96)  BP: 113/67 (02 Jan 2018 07:14) (103/57 - 153/68)  BP(mean): --  RR: 19 (02 Jan 2018 07:14) (17 - 19)  SpO2: 95% (02 Jan 2018 07:14) (92% - 97%)      REVIEW OF SYSTEMS:  limited do to confusion     On Neurological Examination:    Mental Status - Patient is alert, awake,   positive confusion       Follow simple commands    Speech -   Fluent     Cranial Nerves - Pupils 3 mm equal and reactive to light,   extraocular eye movements intact.   smile symmetric  intact bilateral NLF    Motor Exam -   Right upper 4/5  Left upper 4/5  Right lower 3+/5  Left lower 3+/5      Muscle tone - is normal all over.  No asymmetry is seen.        GENERAL Exam: Nontoxic , No Acute Distress   	  HEENT:  normocephalic, atraumatic  		  LUNGS: Clear bilaterally    	  HEART: Normal S1S2   No murmur RRR        	  GI/ ABDOMEN:  Soft  Non tender    EXTREMITIES:   No Edema  No Clubbing  No Cyanosis No Edema    MUSCULOSKELETAL: decreased Range of Motion all 4 extremities   	   SKIN: Normal  No Ecchymosis             LABS:            Hemoglobin A1C:       Vitamin B12         RADIOLOGY      ANALYSIS AND PLAN:  A 89-year-old with history of ataxia, frequent falls, change in mental status,  1.	For frequent falls, suspect this could be possibly secondary to age-related changes, deconditioning, history of spinal disk disease.  There is no clear sign on examination to suggest new cerebrovascular accident had ensued.  2.	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  3.	For change in mental status with regards to forgetfulness, suspect this could be possibly mild cognitive impairment versus subtle dementia.   4.	For lumbago, suspect this is secondary to radiculopathy, cervical, thoracic and lumbar in nature and spinal stenosis.  5.	spoke to daughter Brii at [phone] 12/29/17  [place] at [phone]. spoke to son yesterday 12/30/17 no response today 1/1/18  6.	rehab placement --- spoke to daughter in past  7.	suspect increasing CMS from hospital setting -- psychosis along with underlying MCI worse in hopsital  8.	for rehab placement today      Thank you for the courtesy of consultation.    Physical therapy evaluation as tolerated  OOB to chair/ambulation with assistance only if possible.  Advanced care planning was discussed with family.  Pain is accessed and addressed.  Greater than 35 minutes spent in direct patient care reviewing  the notes, lab data/ imaging , discussion with multidisciplinary team.

## 2018-01-02 NOTE — DISCHARGE NOTE ADULT - CARE PLAN
Principal Discharge DX:	Gait abnormality  Goal:	.  Instructions for follow-up, activity and diet:	Continue current medications  Follow-up with your primary care doctor within 1 week.  Secondary Diagnosis:	Essential hypertension  Instructions for follow-up, activity and diet:	Continue current medications  Follow-up with your primary care doctor within 1 week.  Secondary Diagnosis:	Dementia  Instructions for follow-up, activity and diet:	supportive care

## 2018-01-02 NOTE — PROVIDER CONTACT NOTE (OTHER) - ACTION/TREATMENT ORDERED:
Dr. Lord notified, no new orders received t this time
To continue with discharge to rehab as per Austin Lord

## 2018-01-02 NOTE — PROGRESS NOTE ADULT - PROVIDER SPECIALTY LIST ADULT
Internal Medicine
Neurology
Psychiatry
Neurology

## 2018-01-02 NOTE — PROVIDER CONTACT NOTE (OTHER) - SITUATION
as above pt scheduled for discharge to rehab at 1700 .  Temp at 1517 was 99.7 oral.  Temp repeated at 1637 and was 99.4

## 2018-01-02 NOTE — DISCHARGE NOTE ADULT - HOSPITAL COURSE
89 yo female with hx htn, hld, gerd, chronic back pain bibems due to chronic back pain. As per patient, " my son doesn't know what to do with me anymore." Patient denies any pain at present. PAtient with fall x 3 days ago, but has been ambualtory at home with walker. Patient was in ED yesterday for similar complaint, and was evaluated with labs, xray, sono. Patient was ambulatory with walker in ED yesterday. denies headache or chest pain or abdominal pain,    Medically stable. No signs of infection. Patient went to rehab after 3 nights stay. Patient has dementia.

## 2018-01-02 NOTE — PROVIDER CONTACT NOTE (OTHER) - SITUATION
Stat blood work ordered, pt adamantly refusing to allow blood to be drawn.  Explained to pt rationale for blood draw but continues to refuse.  Pts sister was on the phone and she was also unable to

## 2018-01-02 NOTE — DISCHARGE NOTE ADULT - PATIENT PORTAL LINK FT
“You can access the Follow[facility] Patient Portal, offered by [facility], by registering with the following website: [email or web address]”

## 2018-01-02 NOTE — DISCHARGE NOTE ADULT - MEDICATION SUMMARY - MEDICATIONS TO TAKE
I will START or STAY ON the medications listed below when I get home from the hospital:    aspirin 81 mg oral tablet, chewable  -- 1 tab(s) by mouth once a day  -- Indication: For .    Tylenol 325 mg oral tablet  -- 2 tab(s) by mouth every 6 hours, As Needed, pain  -- Indication: For .    valsartan 80 mg oral capsule  -- 1 tab(s) by mouth once a day  -- Indication: For .    allopurinol 100 mg oral tablet  -- 1 tab(s) by mouth once a day  -- Indication: For .    atorvastatin 20 mg oral tablet  -- 1 tab(s) by mouth once a day  -- Indication: For .    risperiDONE 0.5 mg oral tablet  -- 1 tab(s) by mouth once a day (at bedtime)  -- Indication: For .    amLODIPine 10 mg oral tablet  -- 1 tab(s) by mouth once a day  -- Indication: For .    lidocaine 5% topical film  -- Apply on skin to affected area once a day  -- Indication: For .    lactobacillus acidophilus oral capsule  -- 1 cap(s) by mouth 3 times a day  -- Indication: For .    pantoprazole 40 mg oral delayed release tablet  -- 1 tab(s) by mouth once a day (before a meal)  -- Indication: For .    levothyroxine 75 mcg (0.075 mg) oral tablet  -- 1 tab(s) by mouth once a day  -- Indication: For .    Multiple Vitamins oral tablet  -- 1 tab(s) by mouth once a day  -- Indication: For .

## 2018-01-02 NOTE — DISCHARGE NOTE ADULT - MEDICATION SUMMARY - MEDICATIONS TO STOP TAKING
I will STOP taking the medications listed below when I get home from the hospital:    acetaminophen-oxyCODONE 325 mg-5 mg oral tablet  -- 1 tab(s) by mouth every 6 hours MDD:4  -- Caution federal law prohibits the transfer of this drug to any person other  than the person for whom it was prescribed.  May cause drowsiness.  Alcohol may intensify this effect.  Use care when operating dangerous machinery.  This prescription cannot be refilled.  This product contains acetaminophen.  Do not use  with any other product containing acetaminophen to prevent possible liver damage.  Using more of this medication than prescribed may cause serious breathing problems.

## 2018-09-28 ENCOUNTER — EMERGENCY (EMERGENCY)
Facility: HOSPITAL | Age: 83
LOS: 1 days | Discharge: HOME | End: 2018-09-28
Attending: EMERGENCY MEDICINE
Payer: MEDICARE

## 2018-09-28 VITALS
HEART RATE: 73 BPM | OXYGEN SATURATION: 97 % | TEMPERATURE: 99 F | HEIGHT: 64 IN | SYSTOLIC BLOOD PRESSURE: 103 MMHG | RESPIRATION RATE: 16 BRPM | WEIGHT: 139.99 LBS | DIASTOLIC BLOOD PRESSURE: 63 MMHG

## 2018-09-28 DIAGNOSIS — Z95.0 PRESENCE OF CARDIAC PACEMAKER: Chronic | ICD-10-CM

## 2018-09-28 PROCEDURE — 99284 EMERGENCY DEPT VISIT MOD MDM: CPT | Mod: 25

## 2018-09-28 PROCEDURE — 70450 CT HEAD/BRAIN W/O DYE: CPT

## 2018-09-28 PROCEDURE — 99284 EMERGENCY DEPT VISIT MOD MDM: CPT

## 2018-09-28 PROCEDURE — 70450 CT HEAD/BRAIN W/O DYE: CPT | Mod: 26

## 2018-09-28 NOTE — ED ADULT NURSE NOTE - OBJECTIVE STATEMENT
received pt in bed 310a Pt alert confused as per son pt has dementia & was wondering outside today & refused to go home with her so he called the police. Pt has no complaints Pt seen by Dr López

## 2018-09-28 NOTE — ED ADULT NURSE NOTE - NSIMPLEMENTINTERV_GEN_ALL_ED
Implemented All Fall Risk Interventions:  [place] to call system. Call bell, personal items and telephone within reach. Instruct patient to call for assistance. Room bathroom lighting operational. Non-slip footwear when patient is off stretcher. Physically safe environment: no spills, clutter or unnecessary equipment. Stretcher in lowest position, wheels locked, appropriate side rails in place. Provide visual cue, wrist band, yellow gown, etc. Monitor gait and stability. Monitor for mental status changes and reorient to person, place, and time. Review medications for side effects contributing to fall risk. Reinforce activity limits and safety measures with patient and family.

## 2018-09-28 NOTE — ED PROVIDER NOTE - OBJECTIVE STATEMENT
bibems. As per son, pt didn't want to go back into the house with him.  pt fell 3 weeks ago and had some headache? No other complaint.

## 2019-01-01 NOTE — ED PROVIDER NOTE - DURATION
VSS.  Age appropriate output. Hep B given. Breastfeeding well. Mother requires minimum assist with latch. Positive attachment behaviors observed with mom. Will continue with routine  cares.   day(s)

## 2019-05-12 ENCOUNTER — INPATIENT (INPATIENT)
Facility: HOSPITAL | Age: 84
LOS: 2 days | Discharge: HOME | DRG: 552 | End: 2019-05-15
Attending: HOSPITALIST | Admitting: FAMILY MEDICINE
Payer: MEDICARE

## 2019-05-12 VITALS
HEIGHT: 64 IN | RESPIRATION RATE: 15 BRPM | WEIGHT: 119.93 LBS | DIASTOLIC BLOOD PRESSURE: 63 MMHG | OXYGEN SATURATION: 94 % | TEMPERATURE: 98 F | SYSTOLIC BLOOD PRESSURE: 110 MMHG | HEART RATE: 70 BPM

## 2019-05-12 DIAGNOSIS — F41.9 ANXIETY DISORDER, UNSPECIFIED: ICD-10-CM

## 2019-05-12 DIAGNOSIS — K21.9 GASTRO-ESOPHAGEAL REFLUX DISEASE WITHOUT ESOPHAGITIS: ICD-10-CM

## 2019-05-12 DIAGNOSIS — Z95.0 PRESENCE OF CARDIAC PACEMAKER: Chronic | ICD-10-CM

## 2019-05-12 DIAGNOSIS — M54.9 DORSALGIA, UNSPECIFIED: ICD-10-CM

## 2019-05-12 DIAGNOSIS — R26.2 DIFFICULTY IN WALKING, NOT ELSEWHERE CLASSIFIED: ICD-10-CM

## 2019-05-12 DIAGNOSIS — D72.829 ELEVATED WHITE BLOOD CELL COUNT, UNSPECIFIED: ICD-10-CM

## 2019-05-12 LAB
ANION GAP SERPL CALC-SCNC: 7 MMOL/L — SIGNIFICANT CHANGE UP (ref 5–17)
APPEARANCE UR: CLEAR — SIGNIFICANT CHANGE UP
APTT BLD: 29.4 SEC — SIGNIFICANT CHANGE UP (ref 27.5–36.3)
BASOPHILS # BLD AUTO: 0.04 K/UL — SIGNIFICANT CHANGE UP (ref 0–0.2)
BASOPHILS NFR BLD AUTO: 0.3 % — SIGNIFICANT CHANGE UP (ref 0–2)
BILIRUB UR-MCNC: NEGATIVE — SIGNIFICANT CHANGE UP
BUN SERPL-MCNC: 45 MG/DL — HIGH (ref 7–23)
CALCIUM SERPL-MCNC: 8.6 MG/DL — SIGNIFICANT CHANGE UP (ref 8.5–10.1)
CHLORIDE SERPL-SCNC: 105 MMOL/L — SIGNIFICANT CHANGE UP (ref 96–108)
CO2 SERPL-SCNC: 28 MMOL/L — SIGNIFICANT CHANGE UP (ref 22–31)
COLOR SPEC: SIGNIFICANT CHANGE UP
CREAT SERPL-MCNC: 1.4 MG/DL — HIGH (ref 0.5–1.3)
DIFF PNL FLD: NEGATIVE — SIGNIFICANT CHANGE UP
EOSINOPHIL # BLD AUTO: 0.08 K/UL — SIGNIFICANT CHANGE UP (ref 0–0.5)
EOSINOPHIL NFR BLD AUTO: 0.6 % — SIGNIFICANT CHANGE UP (ref 0–6)
GLUCOSE SERPL-MCNC: 139 MG/DL — HIGH (ref 70–99)
GLUCOSE UR QL: NEGATIVE — SIGNIFICANT CHANGE UP
HCT VFR BLD CALC: 34.2 % — LOW (ref 34.5–45)
HGB BLD-MCNC: 10.7 G/DL — LOW (ref 11.5–15.5)
IMM GRANULOCYTES NFR BLD AUTO: 0.6 % — SIGNIFICANT CHANGE UP (ref 0–1.5)
INR BLD: 1.14 RATIO — SIGNIFICANT CHANGE UP (ref 0.88–1.16)
KETONES UR-MCNC: NEGATIVE — SIGNIFICANT CHANGE UP
LEUKOCYTE ESTERASE UR-ACNC: NEGATIVE — SIGNIFICANT CHANGE UP
LYMPHOCYTES # BLD AUTO: 0.65 K/UL — LOW (ref 1–3.3)
LYMPHOCYTES # BLD AUTO: 5.1 % — LOW (ref 13–44)
MCHC RBC-ENTMCNC: 30.1 PG — SIGNIFICANT CHANGE UP (ref 27–34)
MCHC RBC-ENTMCNC: 31.3 GM/DL — LOW (ref 32–36)
MCV RBC AUTO: 96.1 FL — SIGNIFICANT CHANGE UP (ref 80–100)
MONOCYTES # BLD AUTO: 0.87 K/UL — SIGNIFICANT CHANGE UP (ref 0–0.9)
MONOCYTES NFR BLD AUTO: 6.8 % — SIGNIFICANT CHANGE UP (ref 2–14)
NEUTROPHILS # BLD AUTO: 11.03 K/UL — HIGH (ref 1.8–7.4)
NEUTROPHILS NFR BLD AUTO: 86.6 % — HIGH (ref 43–77)
NITRITE UR-MCNC: NEGATIVE — SIGNIFICANT CHANGE UP
NRBC # BLD: 0 /100 WBCS — SIGNIFICANT CHANGE UP (ref 0–0)
PH UR: 8 — SIGNIFICANT CHANGE UP (ref 5–8)
PLATELET # BLD AUTO: 229 K/UL — SIGNIFICANT CHANGE UP (ref 150–400)
POTASSIUM SERPL-MCNC: 4.3 MMOL/L — SIGNIFICANT CHANGE UP (ref 3.5–5.3)
POTASSIUM SERPL-SCNC: 4.3 MMOL/L — SIGNIFICANT CHANGE UP (ref 3.5–5.3)
PROT UR-MCNC: NEGATIVE — SIGNIFICANT CHANGE UP
PROTHROM AB SERPL-ACNC: 13.1 SEC — HIGH (ref 10–12.9)
RBC # BLD: 3.56 M/UL — LOW (ref 3.8–5.2)
RBC # FLD: 15.3 % — HIGH (ref 10.3–14.5)
SODIUM SERPL-SCNC: 140 MMOL/L — SIGNIFICANT CHANGE UP (ref 135–145)
SP GR SPEC: 1.01 — SIGNIFICANT CHANGE UP (ref 1.01–1.02)
UROBILINOGEN FLD QL: NEGATIVE — SIGNIFICANT CHANGE UP
WBC # BLD: 12.75 K/UL — HIGH (ref 3.8–10.5)
WBC # FLD AUTO: 12.75 K/UL — HIGH (ref 3.8–10.5)

## 2019-05-12 PROCEDURE — 99223 1ST HOSP IP/OBS HIGH 75: CPT | Mod: AI

## 2019-05-12 PROCEDURE — 73030 X-RAY EXAM OF SHOULDER: CPT | Mod: 26,LT

## 2019-05-12 PROCEDURE — 72170 X-RAY EXAM OF PELVIS: CPT | Mod: 26

## 2019-05-12 PROCEDURE — 99284 EMERGENCY DEPT VISIT MOD MDM: CPT

## 2019-05-12 PROCEDURE — 72100 X-RAY EXAM L-S SPINE 2/3 VWS: CPT | Mod: 26

## 2019-05-12 RX ORDER — LIDOCAINE 4 G/100G
1 CREAM TOPICAL ONCE
Refills: 0 | Status: COMPLETED | OUTPATIENT
Start: 2019-05-12 | End: 2019-05-12

## 2019-05-12 RX ORDER — LEVOTHYROXINE SODIUM 125 MCG
75 TABLET ORAL DAILY
Refills: 0 | Status: DISCONTINUED | OUTPATIENT
Start: 2019-05-12 | End: 2019-05-15

## 2019-05-12 RX ORDER — ALLOPURINOL 300 MG
100 TABLET ORAL DAILY
Refills: 0 | Status: DISCONTINUED | OUTPATIENT
Start: 2019-05-12 | End: 2019-05-15

## 2019-05-12 RX ORDER — DIAZEPAM 5 MG
2 TABLET ORAL ONCE
Refills: 0 | Status: DISCONTINUED | OUTPATIENT
Start: 2019-05-12 | End: 2019-05-12

## 2019-05-12 RX ORDER — ENOXAPARIN SODIUM 100 MG/ML
30 INJECTION SUBCUTANEOUS DAILY
Refills: 0 | Status: DISCONTINUED | OUTPATIENT
Start: 2019-05-12 | End: 2019-05-15

## 2019-05-12 RX ORDER — PANTOPRAZOLE SODIUM 20 MG/1
40 TABLET, DELAYED RELEASE ORAL
Refills: 0 | Status: DISCONTINUED | OUTPATIENT
Start: 2019-05-12 | End: 2019-05-15

## 2019-05-12 RX ORDER — ACETAMINOPHEN 500 MG
650 TABLET ORAL ONCE
Refills: 0 | Status: COMPLETED | OUTPATIENT
Start: 2019-05-12 | End: 2019-05-12

## 2019-05-12 RX ORDER — ACETAMINOPHEN 500 MG
650 TABLET ORAL EVERY 6 HOURS
Refills: 0 | Status: DISCONTINUED | OUTPATIENT
Start: 2019-05-12 | End: 2019-05-12

## 2019-05-12 RX ORDER — RISPERIDONE 4 MG/1
0.5 TABLET ORAL AT BEDTIME
Refills: 0 | Status: DISCONTINUED | OUTPATIENT
Start: 2019-05-12 | End: 2019-05-15

## 2019-05-12 RX ORDER — ACETAMINOPHEN 500 MG
650 TABLET ORAL EVERY 6 HOURS
Refills: 0 | Status: DISCONTINUED | OUTPATIENT
Start: 2019-05-12 | End: 2019-05-15

## 2019-05-12 RX ORDER — ATORVASTATIN CALCIUM 80 MG/1
20 TABLET, FILM COATED ORAL AT BEDTIME
Refills: 0 | Status: DISCONTINUED | OUTPATIENT
Start: 2019-05-12 | End: 2019-05-15

## 2019-05-12 RX ORDER — ASPIRIN/CALCIUM CARB/MAGNESIUM 324 MG
81 TABLET ORAL DAILY
Refills: 0 | Status: DISCONTINUED | OUTPATIENT
Start: 2019-05-12 | End: 2019-05-13

## 2019-05-12 RX ORDER — LIDOCAINE 4 G/100G
1 CREAM TOPICAL DAILY
Refills: 0 | Status: DISCONTINUED | OUTPATIENT
Start: 2019-05-12 | End: 2019-05-15

## 2019-05-12 RX ORDER — AMLODIPINE BESYLATE 2.5 MG/1
10 TABLET ORAL DAILY
Refills: 0 | Status: DISCONTINUED | OUTPATIENT
Start: 2019-05-12 | End: 2019-05-15

## 2019-05-12 RX ADMIN — LIDOCAINE 1 PATCH: 4 CREAM TOPICAL at 11:55

## 2019-05-12 RX ADMIN — RISPERIDONE 0.5 MILLIGRAM(S): 4 TABLET ORAL at 21:53

## 2019-05-12 RX ADMIN — LIDOCAINE 1 PATCH: 4 CREAM TOPICAL at 19:42

## 2019-05-12 RX ADMIN — Medication 650 MILLIGRAM(S): at 00:00

## 2019-05-12 RX ADMIN — ATORVASTATIN CALCIUM 20 MILLIGRAM(S): 80 TABLET, FILM COATED ORAL at 21:53

## 2019-05-12 RX ADMIN — Medication 2 MILLIGRAM(S): at 11:56

## 2019-05-12 RX ADMIN — Medication 1 MILLIGRAM(S): at 16:30

## 2019-05-12 RX ADMIN — Medication 650 MILLIGRAM(S): at 11:56

## 2019-05-12 NOTE — ED ADULT NURSE REASSESSMENT NOTE - NS ED NURSE REASSESS COMMENT FT1
pt moved to bed 2 for closer observation, yellow gown and red slippers given   fall band on.  side rails up, bed locked in lowest position.  call bell within reach

## 2019-05-12 NOTE — ED PROVIDER NOTE - PLAN OF CARE
MEDICAL EXCUSE FOR WORK    2017      Theodore Olson      : 1975  [address] [facility] [address]        To Whom It May Concern,    This is to certify that Theodore Olson was seen at the clinic on 2017 for follow-up on a work comp injury. He will continue on light duty. No pushing, pulling or lifting greater than 10 pounds. No climbing, overhead reaching, excessive bending, squatting or crawling. He is scheduled to follow-up at the clinic in 10 days after having a right EMG of the upper extremity.          SIGNATURE:___________________________________________________________  DARRELL Womcak, JENNIFER-DARRELL Molina, JENNIFER-BC  Family Nurse Practitioner  [address] [address]  T [phone]  F [phone]              89yo F h/o cad cabg, CBP dementia p/w mid back pain. per son pt fell 1 wk prior landing on her L shoulder, c/o worsening back pain, no head trauma. pt denies any urinary symptoms.

## 2019-05-12 NOTE — ED PROVIDER NOTE - PHYSICAL EXAMINATION
GEN: awake, alert, uncomfortable    HENT: atraumatic, normocephalic, JENNIFER, EOMI, no midline instability, oropharynx w/o erythema or exudates, no lymphadenopathy  CV: normal rate and rhythm, S1, S2, no MRG, equal pulses throughout, no JVD  RESP: no distress, no IWOB, no retraction, clear to auscultation bilaterally   ABD: soft, nontender, nondistended, no rebound, no guarding, normoactive bowel sounds, no organomegally  MUSCULOSKELETAL: strenght 5/5 x 4, full range of motion, CMS intact, diffuse muscular tenderness   SKIN: normal color, no turgor, no wounds or rash   NEURO: Awake alert oriented x 2, no facial asymmetry, no slurred speech, no pronator drift, moving all extremities  PSYCH: no suicial ideation, no homicidal ideation, no depression, no anxiety, no hallucination

## 2019-05-12 NOTE — ED PROVIDER NOTE - CLINICAL SUMMARY MEDICAL DECISION MAKING FREE TEXT BOX
XR w/o acute pathology, labs w/ mild deb, pt unable to ambulate due to pain, injury, SW consulted, PT currently unavailable, will admit for pain control, PT/SW eval.

## 2019-05-12 NOTE — ED PROVIDER NOTE - CARE PLAN
Assessment and plan of treatment:	89yo F h/o cad cabg, CBP dementia p/w mid back pain. per son pt fell 1 wk prior landing on her L shoulder, c/o worsening back pain, no head trauma. pt denies any urinary symptoms. Principal Discharge DX:	Back pain, unspecified back location, unspecified back pain laterality, unspecified chronicity  Assessment and plan of treatment:	89yo F h/o cad cabg, CBP dementia p/w mid back pain. per son pt fell 1 wk prior landing on her L shoulder, c/o worsening back pain, no head trauma. pt denies any urinary symptoms.  Secondary Diagnosis:	Ambulatory dysfunction

## 2019-05-12 NOTE — ED PROVIDER NOTE - OBJECTIVE STATEMENT
89yo F h/o cad cabg, CBP dementia p/w mid back pain. per son pt fell 1 wk prior landing on her L shoulder, c/o worsening back pain, no head trauma. pt denies any urinary symptoms.

## 2019-05-12 NOTE — ED ADULT NURSE NOTE - NS ED NURSE LEVEL OF CONSCIOUSNESS ORIENTATION
Oriented - self; Oriented - place; Oriented - time Disoriented/Asking repetitive questions/Recognizes caregiver

## 2019-05-12 NOTE — ED ADULT TRIAGE NOTE - ACCOMPANIED BY
Patient  informed of script ready to  at reception desk, reminded to bring photo id, and advised of  Monday-Friday 8am-5pm     Self

## 2019-05-12 NOTE — H&P ADULT - NSICDXPASTMEDICALHX_GEN_ALL_CORE_FT
PAST MEDICAL HISTORY:  Agitation     Anxiety     CAD (coronary artery disease)     Cataract right eye    Chronic back pain     DVT (deep venous thrombosis), right     Essential hypertension     GERD (gastroesophageal reflux disease)     Hyperlipidemia, unspecified hyperlipidemia type     Hypothyroid     Neck pain     Retention of urine

## 2019-05-12 NOTE — ED ADULT NURSE NOTE - NEURO WDL
Alert and oriented to person, place and time, memory intact, behavior appropriate to situation, PERRL.  baseline for pt

## 2019-05-12 NOTE — ED ADULT NURSE REASSESSMENT NOTE - NS ED NURSE REASSESS COMMENT FT1
pt was becoming increasingly confused and agitated.  pt medicated with ativan, offered bedpan, urine obtained and sent to lab as ordered.  VSS, dinner given, assisted with meal.

## 2019-05-12 NOTE — H&P ADULT - HISTORY OF PRESENT ILLNESS
89 y/o F w h/o cad cabg, pace maker, CBP dementia p/w mid back pain. per son pt fell 1 wk prior landing on her L shoulder, c/o worsening back pain, no head trauma. pt denies any LOC. she has been feeling weak and was brought in by family for difficulty ambulating. 89 y/o F w h/o cad cabg, pace maker, CBP dementia p/w mid back pain. per son pt fell 1 wk prior landing on her L shoulder, c/o worsening back pain, no head trauma. pt denies any LOC. she has been feeling weak and was brought in by family for difficulty ambulating.  based on pt current medical condition and inability ot walk pt would require 3 mid night stay and physicla therapy and then rehab placement. pt is also having lleukocytosis sow ill be monitored for development of fever. 89 y/o F w h/o cad cabg, pace maker, CBP dementia p/w mid back pain. per son pt fell 1 wk prior landing on her L shoulder, c/o worsening back pain, no head trauma. pt denies any LOC. she has been feeling weak and was brought in by family for difficulty ambulating.  based on pt current medical condition and inability ot walk pt would require 3 mid night stay and physicla therapy and then rehab placement. pt is also having lleukocytosis so will be monitored for development of fever.

## 2019-05-12 NOTE — ED ADULT NURSE NOTE - NSIMPLEMENTINTERV_GEN_ALL_ED
Implemented All Fall with Harm Risk Interventions:  [place] to call system. Call bell, personal items and telephone within reach. Instruct patient to call for assistance. Room bathroom lighting operational. Non-slip footwear when patient is off stretcher. Physically safe environment: no spills, clutter or unnecessary equipment. Stretcher in lowest position, wheels locked, appropriate side rails in place. Provide visual cue, wrist band, yellow gown, etc. Monitor gait and stability. Monitor for mental status changes and reorient to person, place, and time. Review medications for side effects contributing to fall risk. Reinforce activity limits and safety measures with patient and family. Provide visual clues: red socks.

## 2019-05-12 NOTE — H&P ADULT - ASSESSMENT
89 y.o F h/o cad cabg, Anxiety, dementia p/w mid back pain. per son pt fell 1 wk prior landing on her L shoulder, c/o worsening back pain, no head trauma. pt denies LOC

## 2019-05-12 NOTE — ED PROVIDER NOTE - NS ED ROS FT
GEN: no fever, no chills, no weakness  HENT: no eye pain, no visual changes, no ear pain, no visual or hearing changes, no sore throat, no swelling or neck pain  CV: no chest pain, no palpitations, no dizziness, no swelling  RESP: no coughing, no sob, no IWOB, no AGARWAL  GI: no abd pain, no distension, no nausea, no vomiting, no diarrhea, no constipation  : no dysuria,  no frequency, no hematuria, no discharge, no flank pain  MUSCULOSKELETAL: no myalgia, no arthralgia, no joint swelling, no bruising, +back pain   SKIN: no rash, no wounds, no itching  NEURO: no change in mentation, no visual changes, no HA, no focal weakness, no trouble speaking, no gait abnormalities, no dizziness  PSYCH: no suidical ideation, no homicidal ideation, no depression, no anxiety, no hallucinations

## 2019-05-12 NOTE — ED ADULT NURSE NOTE - OBJECTIVE STATEMENT
family reports chronic back pain.  son reports pt has had back pain "for years" and pt fell out of bed a week ago. family reports chronic back pain.  son reports pt has had back pain "for years" and pt fell out of bed a week ago.  buttocks reddened, blanchable.

## 2019-05-13 DIAGNOSIS — M10.9 GOUT, UNSPECIFIED: ICD-10-CM

## 2019-05-13 LAB
ANION GAP SERPL CALC-SCNC: 8 MMOL/L — SIGNIFICANT CHANGE UP (ref 5–17)
BUN SERPL-MCNC: 50 MG/DL — HIGH (ref 7–23)
CALCIUM SERPL-MCNC: 8.5 MG/DL — SIGNIFICANT CHANGE UP (ref 8.5–10.1)
CHLORIDE SERPL-SCNC: 107 MMOL/L — SIGNIFICANT CHANGE UP (ref 96–108)
CO2 SERPL-SCNC: 25 MMOL/L — SIGNIFICANT CHANGE UP (ref 22–31)
CREAT SERPL-MCNC: 1.4 MG/DL — HIGH (ref 0.5–1.3)
GLUCOSE SERPL-MCNC: 98 MG/DL — SIGNIFICANT CHANGE UP (ref 70–99)
HCT VFR BLD CALC: 32.9 % — LOW (ref 34.5–45)
HGB BLD-MCNC: 10.2 G/DL — LOW (ref 11.5–15.5)
MCHC RBC-ENTMCNC: 29.8 PG — SIGNIFICANT CHANGE UP (ref 27–34)
MCHC RBC-ENTMCNC: 31 GM/DL — LOW (ref 32–36)
MCV RBC AUTO: 96.2 FL — SIGNIFICANT CHANGE UP (ref 80–100)
NRBC # BLD: 0 /100 WBCS — SIGNIFICANT CHANGE UP (ref 0–0)
PLATELET # BLD AUTO: 226 K/UL — SIGNIFICANT CHANGE UP (ref 150–400)
POTASSIUM SERPL-MCNC: 4.5 MMOL/L — SIGNIFICANT CHANGE UP (ref 3.5–5.3)
POTASSIUM SERPL-SCNC: 4.5 MMOL/L — SIGNIFICANT CHANGE UP (ref 3.5–5.3)
RBC # BLD: 3.42 M/UL — LOW (ref 3.8–5.2)
RBC # FLD: 15.6 % — HIGH (ref 10.3–14.5)
SODIUM SERPL-SCNC: 140 MMOL/L — SIGNIFICANT CHANGE UP (ref 135–145)
WBC # BLD: 9.67 K/UL — SIGNIFICANT CHANGE UP (ref 3.8–10.5)
WBC # FLD AUTO: 9.67 K/UL — SIGNIFICANT CHANGE UP (ref 3.8–10.5)

## 2019-05-13 PROCEDURE — 99233 SBSQ HOSP IP/OBS HIGH 50: CPT

## 2019-05-13 PROCEDURE — 73130 X-RAY EXAM OF HAND: CPT | Mod: 26,RT

## 2019-05-13 RX ORDER — ASPIRIN/CALCIUM CARB/MAGNESIUM 324 MG
81 TABLET ORAL
Refills: 0 | Status: DISCONTINUED | OUTPATIENT
Start: 2019-05-15 | End: 2019-05-15

## 2019-05-13 RX ORDER — COLCHICINE 0.6 MG
1.2 TABLET ORAL ONCE
Refills: 0 | Status: COMPLETED | OUTPATIENT
Start: 2019-05-13 | End: 2019-05-13

## 2019-05-13 RX ORDER — COLCHICINE 0.6 MG
0.6 TABLET ORAL ONCE
Refills: 0 | Status: COMPLETED | OUTPATIENT
Start: 2019-05-13 | End: 2019-05-13

## 2019-05-13 RX ADMIN — AMLODIPINE BESYLATE 10 MILLIGRAM(S): 2.5 TABLET ORAL at 05:43

## 2019-05-13 RX ADMIN — Medication 1.2 MILLIGRAM(S): at 17:50

## 2019-05-13 RX ADMIN — ENOXAPARIN SODIUM 30 MILLIGRAM(S): 100 INJECTION SUBCUTANEOUS at 13:57

## 2019-05-13 RX ADMIN — Medication 650 MILLIGRAM(S): at 06:42

## 2019-05-13 RX ADMIN — RISPERIDONE 0.5 MILLIGRAM(S): 4 TABLET ORAL at 22:02

## 2019-05-13 RX ADMIN — Medication 81 MILLIGRAM(S): at 13:58

## 2019-05-13 RX ADMIN — Medication 0.6 MILLIGRAM(S): at 18:57

## 2019-05-13 RX ADMIN — LIDOCAINE 1 PATCH: 4 CREAM TOPICAL at 19:09

## 2019-05-13 RX ADMIN — LIDOCAINE 1 PATCH: 4 CREAM TOPICAL at 13:58

## 2019-05-13 RX ADMIN — Medication 1 TABLET(S): at 13:58

## 2019-05-13 RX ADMIN — Medication 100 MILLIGRAM(S): at 13:58

## 2019-05-13 RX ADMIN — ATORVASTATIN CALCIUM 20 MILLIGRAM(S): 80 TABLET, FILM COATED ORAL at 22:02

## 2019-05-13 RX ADMIN — PANTOPRAZOLE SODIUM 40 MILLIGRAM(S): 20 TABLET, DELAYED RELEASE ORAL at 07:21

## 2019-05-13 RX ADMIN — Medication 650 MILLIGRAM(S): at 13:59

## 2019-05-13 RX ADMIN — Medication 650 MILLIGRAM(S): at 08:30

## 2019-05-13 RX ADMIN — LIDOCAINE 1 PATCH: 4 CREAM TOPICAL at 00:11

## 2019-05-13 RX ADMIN — Medication 75 MICROGRAM(S): at 05:43

## 2019-05-13 NOTE — ED ADULT NURSE REASSESSMENT NOTE - NS ED NURSE REASSESS COMMENT FT1
Lidocaine patch removed as per order.  pt turned and positioned q2h, offered bedpan.  adl's met by staff.

## 2019-05-13 NOTE — ED ADULT NURSE REASSESSMENT NOTE - NS ED NURSE REASSESS COMMENT FT1
upon assessment noted pt to have right 2nd finger and hand redness and swelling - Dr Burroughs was informed and x rays were ordered - Malathi HAYDEN on [address] was informed -

## 2019-05-13 NOTE — PHYSICAL THERAPY INITIAL EVALUATION ADULT - PERTINENT HX OF CURRENT PROBLEM, REHAB EVAL
Pt fell at home 1 week prior to admission landing on her left shoulder. X-rays (-) for fx. PMH: chronic back pain, dementia, CAD, CABG,

## 2019-05-14 DIAGNOSIS — R79.89 OTHER SPECIFIED ABNORMAL FINDINGS OF BLOOD CHEMISTRY: ICD-10-CM

## 2019-05-14 PROCEDURE — 99233 SBSQ HOSP IP/OBS HIGH 50: CPT

## 2019-05-14 RX ORDER — SENNA PLUS 8.6 MG/1
2 TABLET ORAL AT BEDTIME
Refills: 0 | Status: DISCONTINUED | OUTPATIENT
Start: 2019-05-14 | End: 2019-05-15

## 2019-05-14 RX ORDER — DOCUSATE SODIUM 100 MG
100 CAPSULE ORAL THREE TIMES A DAY
Refills: 0 | Status: DISCONTINUED | OUTPATIENT
Start: 2019-05-14 | End: 2019-05-15

## 2019-05-14 RX ORDER — OXYCODONE AND ACETAMINOPHEN 5; 325 MG/1; MG/1
1 TABLET ORAL EVERY 6 HOURS
Refills: 0 | Status: DISCONTINUED | OUTPATIENT
Start: 2019-05-14 | End: 2019-05-15

## 2019-05-14 RX ORDER — TRAMADOL HYDROCHLORIDE 50 MG/1
50 TABLET ORAL EVERY 6 HOURS
Refills: 0 | Status: DISCONTINUED | OUTPATIENT
Start: 2019-05-14 | End: 2019-05-15

## 2019-05-14 RX ORDER — POLYETHYLENE GLYCOL 3350 17 G/17G
17 POWDER, FOR SOLUTION ORAL DAILY
Refills: 0 | Status: DISCONTINUED | OUTPATIENT
Start: 2019-05-14 | End: 2019-05-15

## 2019-05-14 RX ORDER — SENNA PLUS 8.6 MG/1
2 TABLET ORAL AT BEDTIME
Refills: 0 | Status: DISCONTINUED | OUTPATIENT
Start: 2019-05-14 | End: 2019-05-14

## 2019-05-14 RX ORDER — SODIUM CHLORIDE 9 MG/ML
1000 INJECTION INTRAMUSCULAR; INTRAVENOUS; SUBCUTANEOUS
Refills: 0 | Status: DISCONTINUED | OUTPATIENT
Start: 2019-05-14 | End: 2019-05-15

## 2019-05-14 RX ADMIN — Medication 100 MILLIGRAM(S): at 22:39

## 2019-05-14 RX ADMIN — Medication 100 MILLIGRAM(S): at 11:35

## 2019-05-14 RX ADMIN — RISPERIDONE 0.5 MILLIGRAM(S): 4 TABLET ORAL at 22:51

## 2019-05-14 RX ADMIN — Medication 40 MILLIGRAM(S): at 13:19

## 2019-05-14 RX ADMIN — LIDOCAINE 1 PATCH: 4 CREAM TOPICAL at 23:15

## 2019-05-14 RX ADMIN — PANTOPRAZOLE SODIUM 40 MILLIGRAM(S): 20 TABLET, DELAYED RELEASE ORAL at 06:35

## 2019-05-14 RX ADMIN — AMLODIPINE BESYLATE 10 MILLIGRAM(S): 2.5 TABLET ORAL at 06:35

## 2019-05-14 RX ADMIN — OXYCODONE AND ACETAMINOPHEN 1 TABLET(S): 5; 325 TABLET ORAL at 23:28

## 2019-05-14 RX ADMIN — POLYETHYLENE GLYCOL 3350 17 GRAM(S): 17 POWDER, FOR SOLUTION ORAL at 13:19

## 2019-05-14 RX ADMIN — Medication 100 MILLIGRAM(S): at 13:20

## 2019-05-14 RX ADMIN — OXYCODONE AND ACETAMINOPHEN 1 TABLET(S): 5; 325 TABLET ORAL at 22:58

## 2019-05-14 RX ADMIN — SODIUM CHLORIDE 75 MILLILITER(S): 9 INJECTION INTRAMUSCULAR; INTRAVENOUS; SUBCUTANEOUS at 22:52

## 2019-05-14 RX ADMIN — ATORVASTATIN CALCIUM 20 MILLIGRAM(S): 80 TABLET, FILM COATED ORAL at 22:39

## 2019-05-14 RX ADMIN — SENNA PLUS 2 TABLET(S): 8.6 TABLET ORAL at 22:40

## 2019-05-14 RX ADMIN — LIDOCAINE 1 PATCH: 4 CREAM TOPICAL at 19:12

## 2019-05-14 RX ADMIN — SODIUM CHLORIDE 75 MILLILITER(S): 9 INJECTION INTRAMUSCULAR; INTRAVENOUS; SUBCUTANEOUS at 09:52

## 2019-05-14 RX ADMIN — Medication 75 MICROGRAM(S): at 06:35

## 2019-05-14 RX ADMIN — Medication 1 TABLET(S): at 11:35

## 2019-05-14 RX ADMIN — LIDOCAINE 1 PATCH: 4 CREAM TOPICAL at 01:08

## 2019-05-14 RX ADMIN — ENOXAPARIN SODIUM 30 MILLIGRAM(S): 100 INJECTION SUBCUTANEOUS at 11:35

## 2019-05-14 RX ADMIN — LIDOCAINE 1 PATCH: 4 CREAM TOPICAL at 11:36

## 2019-05-14 NOTE — PROGRESS NOTE ADULT - PROBLEM SELECTOR PLAN 1
not improved, added Tramadol and Oxycodone prn for back and shoulder pain.   cont PT OT  Out of bed with assist  Lumbar x ray and shoulder negative for Fx. just shows DJD.
improved,   cont PT OT  Out of bed with assist  tylenol prn pain

## 2019-05-14 NOTE — PROGRESS NOTE ADULT - ASSESSMENT
89 y.o F h/o cad cabg, Anxiety, dementia p/w mid back pain. per son pt fell 1 wk prior landing on her L shoulder, c/o worsening back pain, no head trauma. pt denies LOC
89 y.o F h/o cad cabg, Anxiety, dementia p/w mid back pain. per son pt fell 1 wk prior landing on her L shoulder, c/o worsening back pain, no head trauma. pt denies LOC

## 2019-05-14 NOTE — PROGRESS NOTE ADULT - PROBLEM SELECTOR PROBLEM 1
Back pain, unspecified back location, unspecified back pain laterality, unspecified chronicity
Back pain, unspecified back location, unspecified back pain laterality, unspecified chronicity

## 2019-05-14 NOTE — PROGRESS NOTE ADULT - SUBJECTIVE AND OBJECTIVE BOX
Patient is a 89y old  Female who presents with a chief complaint of back pain (12 May 2019 17:52)      INTERVAL HPI/OVERNIGHT EVENTS: 89 y.o F h/o cad cabg, Anxiety, dementia p/w mid back pain. per son pt fell 1 wk prior landing on her L shoulder, c/o worsening back pain, no head trauma. pt denies LOC. pt complains of right wrist.     MEDICATIONS  (STANDING):  allopurinol 100 milliGRAM(s) Oral daily  amLODIPine   Tablet 10 milliGRAM(s) Oral daily  aspirin  chewable 81 milliGRAM(s) Oral daily  atorvastatin 20 milliGRAM(s) Oral at bedtime  colchicine 1.2 milliGRAM(s) Oral once  colchicine 0.6 milliGRAM(s) Oral once  enoxaparin Injectable 30 milliGRAM(s) SubCutaneous daily  levothyroxine 75 MICROGram(s) Oral daily  lidocaine   Patch 1 Patch Transdermal daily  multivitamin 1 Tablet(s) Oral daily  pantoprazole    Tablet 40 milliGRAM(s) Oral before breakfast  risperiDONE   Tablet 0.5 milliGRAM(s) Oral at bedtime    MEDICATIONS  (PRN):  acetaminophen   Tablet .. 650 milliGRAM(s) Oral every 6 hours PRN Temp greater or equal to 38C (100.4F), Mild Pain (1 - 3)      Allergies    No Known Allergies    Intolerances        REVIEW OF SYSTEMS:  UTO, dementia.     Vital Signs Last 24 Hrs  T(C): 36.7 (13 May 2019 14:05), Max: 37.2 (13 May 2019 10:51)  T(F): 98.1 (13 May 2019 14:05), Max: 98.9 (13 May 2019 10:51)  HR: 81 (13 May 2019 14:05) (64 - 81)  BP: 104/56 (13 May 2019 14:05) (104/56 - 131/62)  BP(mean): 92 (13 May 2019 14:05) (92 - 92)  RR: 16 (13 May 2019 14:05) (14 - 16)  SpO2: 92% (13 May 2019 14:05) (92% - 95%)    PHYSICAL EXAM:  GENERAL: NAD, well-groomed, well-developed  HEAD:  Atraumatic, Normocephalic  EYES: EOMI, PERRLA, conjunctiva and sclera clear  ENMT: No tonsillar erythema, exudates, or enlargement; Moist mucous membranes, Good dentition, No lesions  NECK: Supple, No JVD, Normal thyroid  NERVOUS SYSTEM:  Alert & Oriented X1, Motor Strength 5/5 B/L upper and lower extremities; DTRs 2+ intact and symmetric  CHEST/LUNG: Clear to auscultation bilaterally; No rales, rhonchi, wheezing, or rubs  HEART: Regular rate and rhythm; No murmurs, rubs, or gallops  ABDOMEN: Soft, Nontender, Nondistended; Bowel sounds present  EXTREMITIES:  2+ Peripheral Pulses, No clubbing, cyanosis, or edema,  ++ erythema and swelling on right   LYMPH: No lymphadenopathy noted  SKIN: No rashes or lesions    LABS:                        10.2   9.67  )-----------( 226      ( 13 May 2019 08:58 )             32.9     13 May 2019 08:58    140    |  107    |  50     ----------------------------<  98     4.5     |  25     |  1.40     Ca    8.5        13 May 2019 08:58      PT/INR - ( 12 May 2019 14:28 )   PT: 13.1 sec;   INR: 1.14 ratio         PTT - ( 12 May 2019 14:28 )  PTT:29.4 sec  Urinalysis Basic - ( 12 May 2019 14:31 )    Color: Pale Yellow / Appearance: Clear / S.010 / pH: x  Gluc: x / Ketone: Negative  / Bili: Negative / Urobili: Negative   Blood: x / Protein: Negative / Nitrite: Negative   Leuk Esterase: Negative / RBC: x / WBC x   Sq Epi: x / Non Sq Epi: x / Bacteria: x      CAPILLARY BLOOD GLUCOSE          RADIOLOGY & ADDITIONAL TESTS:    Imaging Personally Reviewed:  [x ] YES  [ ] NO    Consultant(s) Notes Reviewed:  [x ] YES  [ ] NO    Care Discussed with Consultants/Other Providers [ x] YES  [ ] NO
Patient is a 89y old  Female who presents with a chief complaint of back pain (13 May 2019 15:20)      INTERVAL HPI/OVERNIGHT EVENTS: 89 y.o F h/o cad cabg, Anxiety, dementia p/w mid back pain. per son pt fell 1 wk prior landing on her L shoulder, c/o worsening back pain, no head trauma. pt denies LOC. pt  complains of back and shoulder pain,     MEDICATIONS  (STANDING):  allopurinol 100 milliGRAM(s) Oral daily  amLODIPine   Tablet 10 milliGRAM(s) Oral daily  atorvastatin 20 milliGRAM(s) Oral at bedtime  docusate sodium 100 milliGRAM(s) Oral three times a day  enoxaparin Injectable 30 milliGRAM(s) SubCutaneous daily  levothyroxine 75 MICROGram(s) Oral daily  lidocaine   Patch 1 Patch Transdermal daily  methylPREDNISolone sodium succinate Injectable 40 milliGRAM(s) IV Push every 12 hours  multivitamin 1 Tablet(s) Oral daily  pantoprazole    Tablet 40 milliGRAM(s) Oral before breakfast  polyethylene glycol 3350 17 Gram(s) Oral daily  risperiDONE   Tablet 0.5 milliGRAM(s) Oral at bedtime  senna 2 Tablet(s) Oral at bedtime  sodium chloride 0.9%. 1000 milliLiter(s) (75 mL/Hr) IV Continuous <Continuous>    MEDICATIONS  (PRN):  acetaminophen   Tablet .. 650 milliGRAM(s) Oral every 6 hours PRN Temp greater or equal to 38C (100.4F), Mild Pain (1 - 3)  oxyCODONE    5 mG/acetaminophen 325 mG 1 Tablet(s) Oral every 6 hours PRN Severe Pain (7 - 10)  traMADol 50 milliGRAM(s) Oral every 6 hours PRN Moderate Pain (4 - 6)      Allergies    No Known Allergies    Intolerances        REVIEW OF SYSTEMS:  CONSTITUTIONAL: No fever, weight loss, or fatigue  EYES: No eye pain, visual disturbances, or discharge  ENMT:  No difficulty hearing, tinnitus, vertigo; No sinus or throat pain  NECK: No pain or stiffness  BREASTS: No pain, masses, or nipple discharge  RESPIRATORY: No cough, wheezing, chills or hemoptysis; No shortness of breath  CARDIOVASCULAR: No chest pain, palpitations, dizziness, or leg swelling  GASTROINTESTINAL: No abdominal or epigastric pain. No nausea, vomiting, or hematemesis; No diarrhea or constipation. No melena or hematochezia.  GENITOURINARY: No dysuria, frequency, hematuria, or incontinence  NEUROLOGICAL: No headaches, memory loss, loss of strength, numbness, or tremors  SKIN: No itching, burning, rashes, or lesions   LYMPH NODES: No enlarged glands  ENDOCRINE: No heat or cold intolerance; No hair loss; No polydipsia or polyuria  MUSCULOSKELETAL: No joint pain or swelling; No muscle, back, or extremity pain  PSYCHIATRIC: No depression, anxiety, mood swings, or difficulty sleeping  HEME/LYMPH: No easy bruising, or bleeding gums  ALLERGY AND IMMUNOLOGIC: No hives or eczema    Vital Signs Last 24 Hrs  T(C): 36.4 (14 May 2019 14:00), Max: 36.6 (13 May 2019 20:33)  T(F): 97.5 (14 May 2019 14:00), Max: 97.9 (13 May 2019 20:33)  HR: 60 (14 May 2019 14:00) (60 - 66)  BP: 109/58 (14 May 2019 14:00) (109/58 - 130/65)  BP(mean): --  RR: 16 (14 May 2019 14:00) (16 - 16)  SpO2: 91% (14 May 2019 14:00) (91% - 92%)    PHYSICAL EXAM:  GENERAL: NAD, well-groomed, well-developed  HEAD:  Atraumatic, Normocephalic  EYES: EOMI, PERRLA, conjunctiva and sclera clear  ENMT: No tonsillar erythema, exudates, or enlargement; Moist mucous membranes, Good dentition, No lesions  NECK: Supple, No JVD, Normal thyroid  NERVOUS SYSTEM:  Alert & Oriented X3, Good concentration; Motor Strength 5/5 B/L upper and lower extremities; DTRs 2+ intact and symmetric  CHEST/LUNG: Clear to auscultation bilaterally; No rales, rhonchi, wheezing, or rubs  HEART: Regular rate and rhythm; No murmurs, rubs, or gallops  ABDOMEN: Soft, Nontender, Nondistended; Bowel sounds present  EXTREMITIES:  2+ Peripheral Pulses, No clubbing, cyanosis, or edema  LYMPH: No lymphadenopathy noted  SKIN: right wrist pain and warm , tender.     LABS:      Ca    8.5        13 May 2019 08:58          CAPILLARY BLOOD GLUCOSE          RADIOLOGY & ADDITIONAL TESTS:    Imaging Personally Reviewed:  [x ] YES  [ ] NO    Consultant(s) Notes Reviewed:  [x ] YES  [ ] NO    Care Discussed with Consultants/Other Providers [x ] YES  [ ] NO

## 2019-05-14 NOTE — PROGRESS NOTE ADULT - PROBLEM SELECTOR PLAN 6
started on Colchicine po and xray negative, check UA. not improved with colchicine, started on IV Solumedrol BID. start to taper if right wrist pain.
started on Colchicine po and xray negative, check UA.

## 2019-05-14 NOTE — GOALS OF CARE CONVERSATION - PERSONAL ADVANCE DIRECTIVE - CONVERSATION DETAILS
met pt with son, [place], confirmed hcp from past hospitalization. [facility] is hcp, alt hcp [place]. hcp placed in chart.

## 2019-05-15 ENCOUNTER — TRANSCRIPTION ENCOUNTER (OUTPATIENT)
Age: 84
End: 2019-05-15

## 2019-05-15 VITALS
SYSTOLIC BLOOD PRESSURE: 130 MMHG | TEMPERATURE: 97 F | DIASTOLIC BLOOD PRESSURE: 76 MMHG | OXYGEN SATURATION: 95 % | HEART RATE: 71 BPM | RESPIRATION RATE: 18 BRPM

## 2019-05-15 LAB
ANION GAP SERPL CALC-SCNC: 12 MMOL/L — SIGNIFICANT CHANGE UP (ref 5–17)
BUN SERPL-MCNC: 60 MG/DL — HIGH (ref 7–23)
CALCIUM SERPL-MCNC: 8.4 MG/DL — LOW (ref 8.5–10.1)
CHLORIDE SERPL-SCNC: 110 MMOL/L — HIGH (ref 96–108)
CO2 SERPL-SCNC: 19 MMOL/L — LOW (ref 22–31)
CREAT SERPL-MCNC: 1.5 MG/DL — HIGH (ref 0.5–1.3)
GLUCOSE SERPL-MCNC: 190 MG/DL — HIGH (ref 70–99)
HCT VFR BLD CALC: 35.1 % — SIGNIFICANT CHANGE UP (ref 34.5–45)
HGB BLD-MCNC: 10.8 G/DL — LOW (ref 11.5–15.5)
MCHC RBC-ENTMCNC: 29.7 PG — SIGNIFICANT CHANGE UP (ref 27–34)
MCHC RBC-ENTMCNC: 30.8 GM/DL — LOW (ref 32–36)
MCV RBC AUTO: 96.4 FL — SIGNIFICANT CHANGE UP (ref 80–100)
NRBC # BLD: 0 /100 WBCS — SIGNIFICANT CHANGE UP (ref 0–0)
PLATELET # BLD AUTO: 253 K/UL — SIGNIFICANT CHANGE UP (ref 150–400)
POTASSIUM SERPL-MCNC: 3.8 MMOL/L — SIGNIFICANT CHANGE UP (ref 3.5–5.3)
POTASSIUM SERPL-SCNC: 3.8 MMOL/L — SIGNIFICANT CHANGE UP (ref 3.5–5.3)
RBC # BLD: 3.64 M/UL — LOW (ref 3.8–5.2)
RBC # FLD: 15.1 % — HIGH (ref 10.3–14.5)
SODIUM SERPL-SCNC: 141 MMOL/L — SIGNIFICANT CHANGE UP (ref 135–145)
WBC # BLD: 11.6 K/UL — HIGH (ref 3.8–10.5)
WBC # FLD AUTO: 11.6 K/UL — HIGH (ref 3.8–10.5)

## 2019-05-15 PROCEDURE — 85027 COMPLETE CBC AUTOMATED: CPT

## 2019-05-15 PROCEDURE — 99238 HOSP IP/OBS DSCHRG MGMT 30/<: CPT

## 2019-05-15 PROCEDURE — 73030 X-RAY EXAM OF SHOULDER: CPT

## 2019-05-15 PROCEDURE — 85730 THROMBOPLASTIN TIME PARTIAL: CPT

## 2019-05-15 PROCEDURE — 97530 THERAPEUTIC ACTIVITIES: CPT

## 2019-05-15 PROCEDURE — 85610 PROTHROMBIN TIME: CPT

## 2019-05-15 PROCEDURE — 73130 X-RAY EXAM OF HAND: CPT

## 2019-05-15 PROCEDURE — 83036 HEMOGLOBIN GLYCOSYLATED A1C: CPT

## 2019-05-15 PROCEDURE — 72100 X-RAY EXAM L-S SPINE 2/3 VWS: CPT

## 2019-05-15 PROCEDURE — 97162 PT EVAL MOD COMPLEX 30 MIN: CPT

## 2019-05-15 PROCEDURE — 84550 ASSAY OF BLOOD/URIC ACID: CPT

## 2019-05-15 PROCEDURE — 99285 EMERGENCY DEPT VISIT HI MDM: CPT | Mod: 25

## 2019-05-15 PROCEDURE — 96372 THER/PROPH/DIAG INJ SC/IM: CPT

## 2019-05-15 PROCEDURE — 72170 X-RAY EXAM OF PELVIS: CPT

## 2019-05-15 PROCEDURE — 80048 BASIC METABOLIC PNL TOTAL CA: CPT

## 2019-05-15 PROCEDURE — 81003 URINALYSIS AUTO W/O SCOPE: CPT

## 2019-05-15 PROCEDURE — 36415 COLL VENOUS BLD VENIPUNCTURE: CPT

## 2019-05-15 PROCEDURE — 97110 THERAPEUTIC EXERCISES: CPT

## 2019-05-15 RX ORDER — SODIUM CHLORIDE 9 MG/ML
1000 INJECTION INTRAMUSCULAR; INTRAVENOUS; SUBCUTANEOUS
Refills: 0 | Status: DISCONTINUED | OUTPATIENT
Start: 2019-05-15 | End: 2019-05-15

## 2019-05-15 RX ORDER — DOCUSATE SODIUM 100 MG
1 CAPSULE ORAL
Qty: 0 | Refills: 0 | DISCHARGE
Start: 2019-05-15

## 2019-05-15 RX ORDER — SENNA PLUS 8.6 MG/1
2 TABLET ORAL
Qty: 0 | Refills: 0 | DISCHARGE
Start: 2019-05-15

## 2019-05-15 RX ORDER — POLYETHYLENE GLYCOL 3350 17 G/17G
17 POWDER, FOR SOLUTION ORAL
Qty: 0 | Refills: 0 | DISCHARGE
Start: 2019-05-15

## 2019-05-15 RX ORDER — VALSARTAN 80 MG/1
1 TABLET ORAL
Qty: 0 | Refills: 0 | DISCHARGE

## 2019-05-15 RX ADMIN — Medication 100 MILLIGRAM(S): at 06:24

## 2019-05-15 RX ADMIN — Medication 75 MICROGRAM(S): at 06:24

## 2019-05-15 RX ADMIN — Medication 81 MILLIGRAM(S): at 12:06

## 2019-05-15 RX ADMIN — Medication 100 MILLIGRAM(S): at 12:05

## 2019-05-15 RX ADMIN — Medication 40 MILLIGRAM(S): at 06:24

## 2019-05-15 RX ADMIN — PANTOPRAZOLE SODIUM 40 MILLIGRAM(S): 20 TABLET, DELAYED RELEASE ORAL at 06:24

## 2019-05-15 RX ADMIN — SODIUM CHLORIDE 100 MILLILITER(S): 9 INJECTION INTRAMUSCULAR; INTRAVENOUS; SUBCUTANEOUS at 12:15

## 2019-05-15 RX ADMIN — AMLODIPINE BESYLATE 10 MILLIGRAM(S): 2.5 TABLET ORAL at 06:24

## 2019-05-15 RX ADMIN — Medication 40 MILLIGRAM(S): at 00:12

## 2019-05-15 RX ADMIN — Medication 1 TABLET(S): at 12:06

## 2019-05-15 RX ADMIN — LIDOCAINE 1 PATCH: 4 CREAM TOPICAL at 12:06

## 2019-05-15 RX ADMIN — ENOXAPARIN SODIUM 30 MILLIGRAM(S): 100 INJECTION SUBCUTANEOUS at 12:05

## 2019-05-15 NOTE — DISCHARGE NOTE NURSING/CASE MANAGEMENT/SOCIAL WORK - NSDCDPATPORTLINK_GEN_ALL_CORE
You can access the [facility] Portal, offered by [facility], by registering with the following website: [email or web address]

## 2019-05-15 NOTE — DISCHARGE NOTE PROVIDER - NSDCCPCAREPLAN_GEN_ALL_CORE_FT
PRINCIPAL DISCHARGE DIAGNOSIS  Diagnosis: Back pain, unspecified back location, unspecified back pain laterality, unspecified chronicity  Assessment and Plan of Treatment: -You were evaluated by the physical therapist and recommended to go to subacute rehab.  Please follow up with your PCP a week after discharge.  -Use tylenol and the lidocaine patch for pain.      SECONDARY DISCHARGE DIAGNOSES  Diagnosis: Prerenal azotemia  Assessment and Plan of Treatment: -You were noted to have a slight elevation in creatinine levels, and recieved IV fluids, which corrected the kidney function.  The elevation was likely due to decreased oral intake.  -Please continue to stay hydrated and eat.    Diagnosis: GERD (gastroesophageal reflux disease)  Assessment and Plan of Treatment: -Continue protonix 40mg daily.    Diagnosis: Anxiety  Assessment and Plan of Treatment: -Continue with risperidone 0.5mg daily    Diagnosis: Gout  Assessment and Plan of Treatment: -Continue with allopurinol.    -Uric acid level was 5 (normal) PRINCIPAL DISCHARGE DIAGNOSIS  Diagnosis: Back pain, unspecified back location, unspecified back pain laterality, unspecified chronicity  Assessment and Plan of Treatment: -You were evaluated by the physical therapist and recommended to go to subacute rehab.  Please follow up with your PCP a week after discharge.  -Use tylenol and the lidocaine patch for pain.      SECONDARY DISCHARGE DIAGNOSES  Diagnosis: Anxiety  Assessment and Plan of Treatment: -Continue with risperidone 0.5mg daily    Diagnosis: GERD (gastroesophageal reflux disease)  Assessment and Plan of Treatment: -Continue protonix 40mg daily.    Diagnosis: Prerenal azotemia  Assessment and Plan of Treatment: -You were noted to have a slight elevation in creatinine levels, and recieved IV fluids, which corrected the kidney function.  The elevation was likely due to decreased oral intake.  - cr/bmp 1 to 2 days repeat cr dc cr 1.5 - avoid any nephrotoxic meds .    Diagnosis: Gout  Assessment and Plan of Treatment: -Continue with allopurinol.    -Uric acid level was 5 (normal)

## 2019-05-15 NOTE — DISCHARGE NOTE PROVIDER - CARE PROVIDER_API CALL
[facility],   Phone: [phone]  Fax: (   )    -  Follow Up Time:

## 2019-05-15 NOTE — DISCHARGE NOTE PROVIDER - NSDCFUADDAPPT_GEN_ALL_CORE_FT
fu bmp in 1 to 2days / discharge  cr 1.5 pt received iv fluid  avoid nephrotoxic agent. increase orl hydration .

## 2019-05-15 NOTE — DISCHARGE NOTE PROVIDER - HOSPITAL COURSE
FROM ADMISSION H+P:     HPI:    89 y/o F w h/o cad cabg, pace maker, CBP dementia p/w mid back pain. per son pt fell 1 wk prior landing on her L shoulder, c/o worsening back pain, no head trauma. pt denies any LOC. she has been feeling weak and was brought in by family for difficulty ambulating.    based on pt current medical condition and inability ot walk pt would require 3 mid night stay and physical therapy and then rehab placement. pt is also having leukocytosis so will be monitored for development of fever. (12 May 2019 17:52)        ---    HOSPITAL COURSE:     Patient was admitted for lower back pain and placed on pain meds.  Evaluated by PT, recommended [facility].  Lumbar xray, pelvis xray, and xray of L shoulder all negative for acute fracture (showed chronic degenerative changes).  Patient noted to have leukocytosis on admission, patient remained afebrile and no signs on infection.  WBC trended down, however was slightly elevated on 3rd day likely due to steroids.  Patient was placed on steroids for possible gout related R wrist pain that did not get better with colchicine.  Uric acid 5.  Likely arthritic pain.          Patient seen and evaluated on day of discharge, medically optimized to be discharged to [facility] with close follow up with PCP.        Vital Signs Last 24 Hrs    T(C): 36.3 (15 May 2019 05:32), Max: 36.4 (14 May 2019 14:00)    T(F): 97.4 (15 May 2019 05:32), Max: 97.5 (14 May 2019 14:00)    HR: 72 (15 May 2019 05:32) (60 - 72)    BP: 110/63 (15 May 2019 05:32) (107/62 - 110/63)    RR: 17 (15 May 2019 05:32) (16 - 17)    SpO2: 93% (15 May 2019 05:32) (91% - 93%)        Physical Exam    General: No apparent distress, sitting in chair comfortably, pleasant    Head: normocephalic, atraumatic    ENT: moist mucous membranes, no pharyngeal exudates    Heart: RRR, S1, S2, no murmurs    Chest: CTA b/l, no rales, rhonchi, or wheezes    Abd: BS+, soft, NT, ND    Back: no CVA tenderness    Extr: no edema or cyanosis    Neuro: AA&Ox2, no focal weakness, sensation to light touch intact    Psych: normal affect        ---    CONSULTANTS:     PT        ---    TIME SPENT:    The total amount of time spent reviewing the hospital notes, laboratory values, imaging findings, assessing/counseling the patient, discussing with consultant physicians, social work, nursing staff took -- minutes        ---    FINAL DISCHARGE DIAGNOSIS LIST:    Please see last daily progress note for final discharge diagnoses FROM ADMISSION H+P:     HPI:    89 y/o F w h/o cad cabg, pace maker, CBP dementia p/w mid back pain. per son pt fell 1 wk prior landing on her L shoulder, c/o worsening back pain, no head trauma. pt denies any LOC. she has been feeling weak and was brought in by family for difficulty ambulating.    based on pt current medical condition and inability ot walk pt would require 3 mid night stay and physical therapy and then rehab placement. pt is also having leukocytosis so will be monitored for development of fever. (12 May 2019 17:52)        ---    HOSPITAL COURSE:     Patient was admitted for lower back pain and placed on pain meds  and iv solumedrol .  Evaluated by PT, recommended [facility].      Lumbar xray, pelvis xray, and xray of L shoulder all negative for acute fracture (showed chronic degenerative changes).  Patient noted to have leukocytosis on admission, patient remained afebrile and no signs on infection possible reactive .      WBC trended down,    Patient was placed on steroids for possible gout related R wrist pain that did not get better with colchicine   so on iv solumedrol  Likely arthritic pain start getting better .      pt noticed to have deb possible sec to decrease po intake of fluid      pt  received iv fluid , recommended to increase oral hydration , fu cr closely avoid all nephrotoxic agent .         Patient seen and evaluated on day of discharge,     medically optimized to be discharged to [facility] with close follow up with PCP.    day of dc physical     Vital Signs Last 24 Hrs    T(C): 36.3 (15 May 2019 05:32), Max: 36.4 (14 May 2019 14:00)    T(F): 97.4 (15 May 2019 05:32), Max: 97.5 (14 May 2019 14:00)    HR: 72 (15 May 2019 05:32) (60 - 72)    BP: 110/63 (15 May 2019 05:32) (107/62 - 110/63)    RR: 17 (15 May 2019 05:32) (16 - 17)    SpO2: 93% (15 May 2019 05:32) (91% - 93%)        Physical Exam    General: No apparent distress, sitting in chair comfortably, pleasant    Head: normocephalic, atraumatic    ENT: moist mucous membranes,     Heart: RRR, S1, S2, no murmurs no tachy     Chest: CTA b/l, no rales, rhonchi, or wheezes    Abd: BS+, soft, NT, ND    Back: no CVA tenderness    Extr: no edema or cyanosis    Neuro: AA&Ox2, no focal weakness, sensation to light touch intact    Psych: normal affect

## 2019-06-15 ENCOUNTER — INPATIENT (INPATIENT)
Facility: HOSPITAL | Age: 84
LOS: 2 days | Discharge: NURSING, REHAB OR LONG-TERM CARE | DRG: 690 | End: 2019-06-18
Attending: FAMILY MEDICINE | Admitting: INTERNAL MEDICINE
Payer: MEDICARE

## 2019-06-15 VITALS
WEIGHT: 149.91 LBS | SYSTOLIC BLOOD PRESSURE: 125 MMHG | RESPIRATION RATE: 18 BRPM | OXYGEN SATURATION: 90 % | DIASTOLIC BLOOD PRESSURE: 70 MMHG | TEMPERATURE: 97 F | HEART RATE: 79 BPM

## 2019-06-15 DIAGNOSIS — Z95.0 PRESENCE OF CARDIAC PACEMAKER: Chronic | ICD-10-CM

## 2019-06-15 DIAGNOSIS — R50.9 FEVER, UNSPECIFIED: ICD-10-CM

## 2019-06-15 DIAGNOSIS — L03.119 CELLULITIS OF UNSPECIFIED PART OF LIMB: ICD-10-CM

## 2019-06-15 DIAGNOSIS — K21.9 GASTRO-ESOPHAGEAL REFLUX DISEASE WITHOUT ESOPHAGITIS: ICD-10-CM

## 2019-06-15 DIAGNOSIS — I25.10 ATHEROSCLEROTIC HEART DISEASE OF NATIVE CORONARY ARTERY WITHOUT ANGINA PECTORIS: ICD-10-CM

## 2019-06-15 DIAGNOSIS — F03.90 UNSPECIFIED DEMENTIA WITHOUT BEHAVIORAL DISTURBANCE: ICD-10-CM

## 2019-06-15 DIAGNOSIS — E03.9 HYPOTHYROIDISM, UNSPECIFIED: ICD-10-CM

## 2019-06-15 DIAGNOSIS — M54.9 DORSALGIA, UNSPECIFIED: ICD-10-CM

## 2019-06-15 DIAGNOSIS — I10 ESSENTIAL (PRIMARY) HYPERTENSION: ICD-10-CM

## 2019-06-15 DIAGNOSIS — E78.5 HYPERLIPIDEMIA, UNSPECIFIED: ICD-10-CM

## 2019-06-15 DIAGNOSIS — Z29.9 ENCOUNTER FOR PROPHYLACTIC MEASURES, UNSPECIFIED: ICD-10-CM

## 2019-06-15 DIAGNOSIS — M10.9 GOUT, UNSPECIFIED: ICD-10-CM

## 2019-06-15 LAB
ALBUMIN SERPL ELPH-MCNC: 3.2 G/DL — LOW (ref 3.3–5)
ALP SERPL-CCNC: 110 U/L — SIGNIFICANT CHANGE UP (ref 40–120)
ALT FLD-CCNC: 25 U/L — SIGNIFICANT CHANGE UP (ref 12–78)
ANION GAP SERPL CALC-SCNC: 7 MMOL/L — SIGNIFICANT CHANGE UP (ref 5–17)
APPEARANCE UR: CLEAR — SIGNIFICANT CHANGE UP
AST SERPL-CCNC: 19 U/L — SIGNIFICANT CHANGE UP (ref 15–37)
BASOPHILS # BLD AUTO: 0.03 K/UL — SIGNIFICANT CHANGE UP (ref 0–0.2)
BASOPHILS NFR BLD AUTO: 0.2 % — SIGNIFICANT CHANGE UP (ref 0–2)
BILIRUB SERPL-MCNC: 0.6 MG/DL — SIGNIFICANT CHANGE UP (ref 0.2–1.2)
BILIRUB UR-MCNC: NEGATIVE — SIGNIFICANT CHANGE UP
BUN SERPL-MCNC: 28 MG/DL — HIGH (ref 7–23)
CALCIUM SERPL-MCNC: 8.8 MG/DL — SIGNIFICANT CHANGE UP (ref 8.5–10.1)
CHLORIDE SERPL-SCNC: 104 MMOL/L — SIGNIFICANT CHANGE UP (ref 96–108)
CO2 SERPL-SCNC: 28 MMOL/L — SIGNIFICANT CHANGE UP (ref 22–31)
COLOR SPEC: YELLOW — SIGNIFICANT CHANGE UP
CREAT SERPL-MCNC: 1.1 MG/DL — SIGNIFICANT CHANGE UP (ref 0.5–1.3)
DIFF PNL FLD: ABNORMAL
EOSINOPHIL # BLD AUTO: 0.01 K/UL — SIGNIFICANT CHANGE UP (ref 0–0.5)
EOSINOPHIL NFR BLD AUTO: 0.1 % — SIGNIFICANT CHANGE UP (ref 0–6)
GLUCOSE SERPL-MCNC: 105 MG/DL — HIGH (ref 70–99)
GLUCOSE UR QL: NEGATIVE — SIGNIFICANT CHANGE UP
HCT VFR BLD CALC: 32 % — LOW (ref 34.5–45)
HGB BLD-MCNC: 10.1 G/DL — LOW (ref 11.5–15.5)
IMM GRANULOCYTES NFR BLD AUTO: 0.6 % — SIGNIFICANT CHANGE UP (ref 0–1.5)
KETONES UR-MCNC: NEGATIVE — SIGNIFICANT CHANGE UP
LACTATE SERPL-SCNC: 0.9 MMOL/L — SIGNIFICANT CHANGE UP (ref 0.7–2)
LEUKOCYTE ESTERASE UR-ACNC: ABNORMAL
LYMPHOCYTES # BLD AUTO: 0.76 K/UL — LOW (ref 1–3.3)
LYMPHOCYTES # BLD AUTO: 5.8 % — LOW (ref 13–44)
MCHC RBC-ENTMCNC: 29.8 PG — SIGNIFICANT CHANGE UP (ref 27–34)
MCHC RBC-ENTMCNC: 31.6 GM/DL — LOW (ref 32–36)
MCV RBC AUTO: 94.4 FL — SIGNIFICANT CHANGE UP (ref 80–100)
MONOCYTES # BLD AUTO: 1.24 K/UL — HIGH (ref 0–0.9)
MONOCYTES NFR BLD AUTO: 9.5 % — SIGNIFICANT CHANGE UP (ref 2–14)
NEUTROPHILS # BLD AUTO: 10.94 K/UL — HIGH (ref 1.8–7.4)
NEUTROPHILS NFR BLD AUTO: 83.8 % — HIGH (ref 43–77)
NITRITE UR-MCNC: NEGATIVE — SIGNIFICANT CHANGE UP
NRBC # BLD: 0 /100 WBCS — SIGNIFICANT CHANGE UP (ref 0–0)
PH UR: 7 — SIGNIFICANT CHANGE UP (ref 5–8)
PLATELET # BLD AUTO: 290 K/UL — SIGNIFICANT CHANGE UP (ref 150–400)
POTASSIUM SERPL-MCNC: 4.3 MMOL/L — SIGNIFICANT CHANGE UP (ref 3.5–5.3)
POTASSIUM SERPL-SCNC: 4.3 MMOL/L — SIGNIFICANT CHANGE UP (ref 3.5–5.3)
PROT SERPL-MCNC: 7.4 G/DL — SIGNIFICANT CHANGE UP (ref 6–8.3)
PROT UR-MCNC: 25 MG/DL
RBC # BLD: 3.39 M/UL — LOW (ref 3.8–5.2)
RBC # FLD: 15.4 % — HIGH (ref 10.3–14.5)
SODIUM SERPL-SCNC: 139 MMOL/L — SIGNIFICANT CHANGE UP (ref 135–145)
SP GR SPEC: 1 — LOW (ref 1.01–1.02)
UROBILINOGEN FLD QL: NEGATIVE — SIGNIFICANT CHANGE UP
WBC # BLD: 13.06 K/UL — HIGH (ref 3.8–10.5)
WBC # FLD AUTO: 13.06 K/UL — HIGH (ref 3.8–10.5)

## 2019-06-15 PROCEDURE — 71045 X-RAY EXAM CHEST 1 VIEW: CPT | Mod: 26

## 2019-06-15 PROCEDURE — 99223 1ST HOSP IP/OBS HIGH 75: CPT | Mod: AI,GC

## 2019-06-15 PROCEDURE — 93970 EXTREMITY STUDY: CPT | Mod: 26

## 2019-06-15 PROCEDURE — 99285 EMERGENCY DEPT VISIT HI MDM: CPT

## 2019-06-15 PROCEDURE — 93010 ELECTROCARDIOGRAM REPORT: CPT

## 2019-06-15 RX ORDER — ACETAMINOPHEN 500 MG
650 TABLET ORAL EVERY 6 HOURS
Refills: 0 | Status: DISCONTINUED | OUTPATIENT
Start: 2019-06-15 | End: 2019-06-18

## 2019-06-15 RX ORDER — VANCOMYCIN HCL 1 G
1000 VIAL (EA) INTRAVENOUS ONCE
Refills: 0 | Status: COMPLETED | OUTPATIENT
Start: 2019-06-15 | End: 2019-06-15

## 2019-06-15 RX ORDER — PIPERACILLIN AND TAZOBACTAM 4; .5 G/20ML; G/20ML
3.38 INJECTION, POWDER, LYOPHILIZED, FOR SOLUTION INTRAVENOUS EVERY 8 HOURS
Refills: 0 | Status: DISCONTINUED | OUTPATIENT
Start: 2019-06-15 | End: 2019-06-18

## 2019-06-15 RX ORDER — LOSARTAN POTASSIUM 100 MG/1
50 TABLET, FILM COATED ORAL DAILY
Refills: 0 | Status: DISCONTINUED | OUTPATIENT
Start: 2019-06-15 | End: 2019-06-18

## 2019-06-15 RX ORDER — ACETAMINOPHEN 500 MG
325 TABLET ORAL EVERY 8 HOURS
Refills: 0 | Status: DISCONTINUED | OUTPATIENT
Start: 2019-06-15 | End: 2019-06-18

## 2019-06-15 RX ORDER — ENOXAPARIN SODIUM 100 MG/ML
30 INJECTION SUBCUTANEOUS EVERY 24 HOURS
Refills: 0 | Status: DISCONTINUED | OUTPATIENT
Start: 2019-06-15 | End: 2019-06-18

## 2019-06-15 RX ORDER — PANTOPRAZOLE SODIUM 20 MG/1
40 TABLET, DELAYED RELEASE ORAL
Refills: 0 | Status: DISCONTINUED | OUTPATIENT
Start: 2019-06-15 | End: 2019-06-18

## 2019-06-15 RX ORDER — ACETAMINOPHEN 500 MG
2 TABLET ORAL
Qty: 0 | Refills: 0 | DISCHARGE

## 2019-06-15 RX ORDER — ATORVASTATIN CALCIUM 80 MG/1
20 TABLET, FILM COATED ORAL AT BEDTIME
Refills: 0 | Status: DISCONTINUED | OUTPATIENT
Start: 2019-06-15 | End: 2019-06-18

## 2019-06-15 RX ORDER — CEFTRIAXONE 500 MG/1
1 INJECTION, POWDER, FOR SOLUTION INTRAMUSCULAR; INTRAVENOUS ONCE
Refills: 0 | Status: COMPLETED | OUTPATIENT
Start: 2019-06-15 | End: 2019-06-15

## 2019-06-15 RX ORDER — ASPIRIN/CALCIUM CARB/MAGNESIUM 324 MG
81 TABLET ORAL DAILY
Refills: 0 | Status: DISCONTINUED | OUTPATIENT
Start: 2019-06-15 | End: 2019-06-18

## 2019-06-15 RX ORDER — VANCOMYCIN HCL 1 G
1000 VIAL (EA) INTRAVENOUS EVERY 12 HOURS
Refills: 0 | Status: DISCONTINUED | OUTPATIENT
Start: 2019-06-15 | End: 2019-06-15

## 2019-06-15 RX ORDER — AMLODIPINE BESYLATE 2.5 MG/1
10 TABLET ORAL DAILY
Refills: 0 | Status: DISCONTINUED | OUTPATIENT
Start: 2019-06-15 | End: 2019-06-18

## 2019-06-15 RX ORDER — ACETAMINOPHEN 500 MG
650 TABLET ORAL ONCE
Refills: 0 | Status: COMPLETED | OUTPATIENT
Start: 2019-06-15 | End: 2019-06-15

## 2019-06-15 RX ORDER — VANCOMYCIN HCL 1 G
1000 VIAL (EA) INTRAVENOUS EVERY 24 HOURS
Refills: 0 | Status: DISCONTINUED | OUTPATIENT
Start: 2019-06-16 | End: 2019-06-17

## 2019-06-15 RX ORDER — ASPIRIN/CALCIUM CARB/MAGNESIUM 324 MG
1 TABLET ORAL
Qty: 0 | Refills: 0 | DISCHARGE

## 2019-06-15 RX ORDER — ATORVASTATIN CALCIUM 80 MG/1
1 TABLET, FILM COATED ORAL
Qty: 0 | Refills: 0 | DISCHARGE

## 2019-06-15 RX ORDER — LEVOTHYROXINE SODIUM 125 MCG
75 TABLET ORAL DAILY
Refills: 0 | Status: DISCONTINUED | OUTPATIENT
Start: 2019-06-15 | End: 2019-06-18

## 2019-06-15 RX ORDER — TRAMADOL HYDROCHLORIDE 50 MG/1
50 TABLET ORAL EVERY 8 HOURS
Refills: 0 | Status: DISCONTINUED | OUTPATIENT
Start: 2019-06-15 | End: 2019-06-18

## 2019-06-15 RX ORDER — ALLOPURINOL 300 MG
100 TABLET ORAL DAILY
Refills: 0 | Status: DISCONTINUED | OUTPATIENT
Start: 2019-06-15 | End: 2019-06-18

## 2019-06-15 RX ADMIN — CEFTRIAXONE 100 GRAM(S): 500 INJECTION, POWDER, FOR SOLUTION INTRAMUSCULAR; INTRAVENOUS at 14:10

## 2019-06-15 RX ADMIN — TRAMADOL HYDROCHLORIDE 50 MILLIGRAM(S): 50 TABLET ORAL at 21:39

## 2019-06-15 RX ADMIN — Medication 1000 MILLIGRAM(S): at 18:12

## 2019-06-15 RX ADMIN — CEFTRIAXONE 1 GRAM(S): 500 INJECTION, POWDER, FOR SOLUTION INTRAMUSCULAR; INTRAVENOUS at 14:40

## 2019-06-15 RX ADMIN — Medication 650 MILLIGRAM(S): at 18:12

## 2019-06-15 RX ADMIN — Medication 250 MILLIGRAM(S): at 15:10

## 2019-06-15 RX ADMIN — Medication 650 MILLIGRAM(S): at 15:10

## 2019-06-15 RX ADMIN — TRAMADOL HYDROCHLORIDE 50 MILLIGRAM(S): 50 TABLET ORAL at 22:30

## 2019-06-15 RX ADMIN — PIPERACILLIN AND TAZOBACTAM 25 GRAM(S): 4; .5 INJECTION, POWDER, LYOPHILIZED, FOR SOLUTION INTRAVENOUS at 21:39

## 2019-06-15 RX ADMIN — ATORVASTATIN CALCIUM 20 MILLIGRAM(S): 80 TABLET, FILM COATED ORAL at 21:39

## 2019-06-15 NOTE — ED ADULT NURSE NOTE - OBJECTIVE STATEMENT
received pt in bed #14a RADHA from home Pt alert confused following commands As per son pt has been feeling weak for the last 3 days rectal temp 101. Pt w/ unstageable on coccyx Bilat lower extrem redness. Black area noted on left shin. Pt staright cath for foul smelling cloudy urine Pt resting Will monitor

## 2019-06-15 NOTE — H&P ADULT - ATTENDING COMMENTS
pt is 89 year old female admitted for weakness , fever ,  most likely source is decubitus ulcer and b/l le cellulitis   will start on iv zosyn and vanco   id consult   wound care consult   doppler negative for DVT   fall precaution   dvt ppx

## 2019-06-15 NOTE — ED PROVIDER NOTE - SKIN WOUND TYPE
billateral distal le cellulitis, scattered dry scratches, no open wound, healed left shin scar, closed hematoma of left anterior shin

## 2019-06-15 NOTE — ED PROVIDER NOTE - CLINICAL SUMMARY MEDICAL DECISION MAKING FREE TEXT BOX
poor historian,  from home, bilateral le cellulitis, coccyx wound, fever, will obtain labs, give iv abx, doppler

## 2019-06-15 NOTE — H&P ADULT - HISTORY OF PRESENT ILLNESS
89 year old F with pmhx of dementia, CAD s/p CABG, pacemaker, HTN, HLD, Chronic back pain, anxiety, GERD, hypothyroidism presenting to ED from home with bilateral leg swelling. Patient is poor historian given dementia hx and no family present at bedside. Patient lives with family, and report weakness and difficulty ambulating     In the ED, vitals: T 97.1-->101.1, HR 79, /70, RR 18 SPO2 90% on RA. Labs significant for WBC 13.06 with left shift (83%), H/H 10.1/32.0, BUN/Cr 28/1.1 with reduced GFR (43). UA with neg nitrites, small LE, small blood, wbc 6-10, occasional epithelial cells, few bacteria. EKG: Atrial sensed paced rhythm at 79bpm. US lower extremity dopplers neg for DVT. CXR prelim read appears clear. Blood cx and ucx pending. Given Vancomycin, Ceftriaxone, Tylenol 650mg. 89 year old F with pmhx of dementia, CAD s/p CABG, pacemaker, HTN, HLD, Chronic back pain, anxiety, GERD, hypothyroidism presenting to ED from home with bilateral leg swelling. Patient is poor historian given dementia hx and no family present at bedside. Patient lives with family, and report weakness and difficulty ambulating. Of note, patient recently admitted to [place] in 5/2019 for worsening lower back pain and was placed on pain meds. Lumbar xray, pelvis xray, and xray of L shoulder all negative for acute fracture. Evaluated by PT, recommended [facility].      In the ED, vitals: T 97.1-->101.1, HR 79, /70, RR 18 SPO2 90% on RA. Labs significant for WBC 13.06 with left shift (83%), H/H 10.1/32.0, BUN/Cr 28/1.1 with reduced GFR (43). UA with neg nitrites, small LE, small blood, wbc 6-10, occasional epithelial cells, few bacteria. EKG: Atrial sensed paced rhythm at 79bpm. US lower extremity dopplers neg for DVT. CXR prelim read appears clear. Blood cx and ucx pending. Given Vancomycin, Ceftriaxone, Tylenol 650mg. 89 year old F with pmhx of dementia, CAD s/p CABG, pacemaker, HTN, HLD, Chronic back pain, anxiety, GERD, hypothyroidism presenting to ED from home with bilateral leg weakness. Patient is poor historian given dementia hx, son present at bedside. Patient recently admitted to [place] in 5/2019 for worsening lower back pain and was placed on pain meds. Lumbar xray, pelvis xray, and xray of L shoulder all negative for acute fracture. Evaluated by PT, recommended [facility] [place]. Upon returning from [facility] [place], patient continued to have weakness and required assistance with ambulation over the course of the last month. Son states that patient has a good appetite and lives with the family.  Brought patient into the ED today because patient was too weak to get herself to the bathroom. Denies fevers, chills, nausea, vomiting, abdominal pain, diarrhea.     In the ED, vitals: T 97.1-->101.1, HR 79, /70, RR 18 SPO2 90% on RA. Labs significant for WBC 13.06 with left shift (83%), H/H 10.1/32.0, BUN/Cr 28/1.1 with reduced GFR (43). UA with neg nitrites, small LE, small blood, wbc 6-10, occasional epithelial cells, few bacteria. EKG: Atrial sensed paced rhythm at 79bpm. US lower extremity dopplers neg for DVT. CXR prelim read appears clear. Blood cx and ucx pending. Given Vancomycin, Ceftriaxone, Tylenol 650mg.

## 2019-06-15 NOTE — PATIENT PROFILE ADULT - NSPROMUTINFOINDIVIDFT_GEN_A_NUR
easily overwhelmed requires low stimulation environment for care, explain to the patient what you are going to do before you do it

## 2019-06-15 NOTE — ED ADULT NURSE NOTE - NSIMPLEMENTINTERV_GEN_ALL_ED
Implemented All Fall Risk Interventions:  [place] to call system. Call bell, personal items and telephone within reach. Instruct patient to call for assistance. Room bathroom lighting operational. Non-slip footwear when patient is off stretcher. Physically safe environment: no spills, clutter or unnecessary equipment. Stretcher in lowest position, wheels locked, appropriate side rails in place. Provide visual cue, wrist band, yellow gown, etc. Monitor gait and stability. Monitor for mental status changes and reorient to person, place, and time. Review medications for side effects contributing to fall risk. Reinforce activity limits and safety measures with patient and family.

## 2019-06-15 NOTE — ED PROVIDER NOTE - OBJECTIVE STATEMENT
89 y female from home, presently no family member at bedside.  presents with fever, sacral wound bilateral le cellulitis

## 2019-06-15 NOTE — H&P ADULT - PROBLEM SELECTOR PLAN 10
IMPROVE VTE Individual Risk Assessment  RISK                                                                Points  [ x ] Previous VTE                                                  3  [  ] Thrombophilia                                               2  [  ] Lower limb paralysis                                      2        (unable to hold up >15 seconds)    [  ] Current Cancer                                              2         (within 6 months)  [x  ] Immobilization > 24 hrs                                1  [  ] ICU/CCU stay > 24 hours                              1  [ x ] Age > 60                                                      1  IMPROVE VTE Score ____5_____    DVT ppx: Lovenox  IMPROVE Score 0-1: Low Risk, No VTE prophylaxis required for most patients, encourage ambulation.   IMPROVE Score 2-3: At risk, pharmacologic VTE prophylaxis is indicated for most patients (in the absence of a contraindication)  IMPROVE Score > or = 4: High Risk, pharmacologic VTE prophylaxis is indicated for most patients (in the absence of a contraindication)

## 2019-06-15 NOTE — H&P ADULT - NSHPPHYSICALEXAM_GEN_ALL_CORE
Vital Signs Last 24 Hrs  T(C): 38.4 (15 Toni 2019 13:30), Max: 38.4 (15 Toni 2019 13:30)  T(F): 101.1 (15 Toni 2019 13:30), Max: 101.1 (15 Toni 2019 13:30)  HR: 79 (15 Toni 2019 13:13) (79 - 79)  BP: 125/70 (15 Toni 2019 13:13) (125/70 - 125/70)  RR: 18 (15 Toni 2019 13:13) (18 - 18)  SpO2: 90% (15 Toni 2019 13:13) (90% - 90%) Vital Signs Last 24 Hrs  T(C): 38.4 (15 Toni 2019 13:30), Max: 38.4 (15 Toni 2019 13:30)  T(F): 101.1 (15 Toni 2019 13:30), Max: 101.1 (15 Toni 2019 13:30)  HR: 79 (15 Toni 2019 13:13) (79 - 79)  BP: 125/70 (15 Toni 2019 13:13) (125/70 - 125/70)  RR: 18 (15 Toni 2019 13:13) (18 - 18)  SpO2: 90% (15 Toni 2019 13:13) (90% - 90%)    Physical Exam:  General: NAD, confused and irritated   HEENT: NCAT, dry MM  Neurology: A&Ox 0  Respiratory: CTA B/L  CV: RRR, +S1/S2, no murmurs  Abdominal: Soft, NT, ND +BSx4  Extremities: b/l lower extremities erythematous with excoriations, chronic venous changes present. L anterior shin with scar. b/l LE painful to touch, warm.

## 2019-06-15 NOTE — CONSULT NOTE ADULT - SUBJECTIVE AND OBJECTIVE BOX
Infectious Diseases Consult by Enedina Darling MD    Reason for Consult :    HPI:  89 year old F with pmhx of dementia, CAD s/p CABG, pacemaker, HTN, HLD, Chronic back pain, anxiety, GERD, hypothyroidism presenting to ED from home with bilateral leg weakness. Denies fevers, chills, nausea, vomiting, abdominal pain, diarrhea. In ED noted to have fever to 101. WBC 13K. UA +. Pt is very confused. Unable to provide history.    Infectious Disease consult was called to evaluate pt and for antibiotic management.      Past Medical & Surgical Hx:  PAST MEDICAL & SURGICAL HISTORY:  Anxiety  GERD (gastroesophageal reflux disease)  Retention of urine  Agitation  Neck pain  Hyperlipidemia, unspecified hyperlipidemia type  Essential hypertension  Chronic back pain  CAD (coronary artery disease)  Cataract: right eye  Hypothyroid  DVT (deep venous thrombosis), right  Pacemaker  S/P coronary artery bypass graft x 1      Social History--  EtOH: denies ***  Tobacco: denies ***  Drug Use: denies ***    Travel/Environmental/Occupational History:  *** inserth T/E/O Hx ***    FAMILY HISTORY:      Allergies    No Known Allergies    Intolerances        Home/ Out patient  Medications :    Current Inpatient Medications :    ANTIBIOTICS:   piperacillin/tazobactam IVPB. 3.375 Gram(s) IV Intermittent every 8 hours    OTHER RELEVANT MEDICATIONS :  acetaminophen   Tablet .. 650 milliGRAM(s) Oral every 6 hours PRN  acetaminophen   Tablet .. 325 milliGRAM(s) Oral every 8 hours PRN  allopurinol 100 milliGRAM(s) Oral daily  amLODIPine   Tablet 10 milliGRAM(s) Oral daily  aspirin  chewable 81 milliGRAM(s) Oral daily  atorvastatin 20 milliGRAM(s) Oral at bedtime  enoxaparin Injectable 30 milliGRAM(s) SubCutaneous every 24 hours  haloperidol    Injectable 1 milliGRAM(s) IntraMuscular every 6 hours PRN  levothyroxine 75 MICROGram(s) Oral daily  losartan 50 milliGRAM(s) Oral daily  pantoprazole    Tablet 40 milliGRAM(s) Oral before breakfast  traMADol 50 milliGRAM(s) Oral every 8 hours PRN      ROS:  Unable to obtain due to : pt's condition    I&O's Detail    15 Toni 2019 07:01  -  16 Jun 2019 07:00  --------------------------------------------------------  IN:  Total IN: 0 mL    OUT:    Voided: 300 mL  Total OUT: 300 mL    Total NET: -300 mL    Physical Exam:  	T(C): 38.4 (15 Toni 2019 13:30), Max: 38.4 (15 Toni 2019 13:30)  	T(F): 101.1 (15 Toni 2019 13:30), Max: 101.1 (15 Toni 2019 13:30)  	HR: 79 (15 Toni 2019 13:13) (79 - 79)  	BP: 125/70 (15 Toni 2019 13:13) (125/70 - 125/70)  	RR: 18 (15 Toni 2019 13:13) (18 - 18)  	SpO2: 90% (15 Toni 2019 13:13) (90% - 90%)      General: no acute distress confused  Eyes: sclera anicteric, pupils equal and reactive to light  ENMT: buccal mucosa moist, pharynx not injected  Neck: supple, trachea midline  Lungs: clear, no wheeze/rhonchi  Cardiovascular: regular rate and rhythm, S1 S2  Abdomen: soft, nontender, no organomegaly present, bowel sounds normal  Neurological:  alert confused, Cranial Nerves II-XII grossly intact  Skin: sacral DTI and right heel DTi  Lymph Nodes: no palpable cervical/supraclavicular lymph nodes enlargements  Extremities: +chronic changes     Labs:   Comprehensive Metabolic Panel (06.15.19 @ 14:26)    Sodium, Serum: 139 mmol/L    Potassium, Serum: 4.3 mmol/L    Chloride, Serum: 104 mmol/L    Carbon Dioxide, Serum: 28 mmol/L    Anion Gap, Serum: 7 mmol/L    Blood Urea Nitrogen, Serum: 28 mg/dL    Creatinine, Serum: 1.10 mg/dL    Glucose, Serum: 105 mg/dL    Calcium, Total Serum: 8.8 mg/dL    Protein Total, Serum: 7.4 g/dL    Albumin, Serum: 3.2 g/dL    Bilirubin Total, Serum: 0.6 mg/dL    Alkaline Phosphatase, Serum: 110 U/L    Aspartate Aminotransferase (AST/SGOT): 19 U/L    Alanine Aminotransferase (ALT/SGPT): 25 U/L      Complete Blood Count + Automated Diff (06.15.19 @ 14:26)    WBC Count: 13.06 K/uL    RBC Count: 3.39 M/uL    Hemoglobin: 10.1 g/dL    Hematocrit: 32.0 %    Mean Cell Volume: 94.4 fl    Mean Cell Hemoglobin: 29.8 pg    Mean Cell Hemoglobin Conc: 31.6 gm/dL    Red Cell Distrib Width: 15.4 %    Platelet Count - Automated: 290 K/uL    Auto Neutrophil #: 10.94 K/uL    Auto Lymphocyte #: 0.76 K/uL    Auto Monocyte #: 1.24 K/uL    Auto Eosinophil #: 0.01 K/uL    Auto Basophil #: 0.03 K/uL    Auto Neutrophil %: 83.8: Differential percentages must be correlated with absolute numbers for  clinical significance. %    Auto Lymphocyte %: 5.8 %    Auto Monocyte %: 9.5 %    Auto Eosinophil %: 0.1 %    Auto Basophil %: 0.2 %    Auto Immature Granulocyte %: 0.6 %    Nucleated RBC: 0 /100 WBCs    RECENT CULTURES:  pending    RADIOLOGY & ADDITIONAL STUDIES:    EXAM:  XR CHEST PORTABLE URGENT 1V                            PROCEDURE DATE:  06/15/2019          INTERPRETATION:  History: Fever    Portable radiograph of the chest is performed. The study of 12/28/2017 is   available for comparison.    Sternal wires and left-sided pacemaker are again seen. The heart is not   enlarged. Mild blunting of left CP angle appears stable from the prior   study. There is linear atelectasis or scar at the left lung base. The   right lung is unremarkable. There is osteopenia of bony structures.    Impression: No active pulmonary disease. Stable mild blunting of left CP   angle  Status post CABG  Pacemaker        Assessment :   89 year old F with pmhx of dementia, CAD s/p CABG, pacemaker, HTN, HLD, Chronic back pain, anxiety, GERD, hypothyroidism admitted with  fever and weakness. WBC 13K. UA +. Rule out UTI.      Plan :   Cont Zosyn  Fu cultures  Trend temps and wbc  Asp precautions      Continue with present regime .  Approptiate use of antibiotics and adverse effects reviewed.      I have discussed the above plan of care with patient/family in detail. They expressed understanding of the treatment plan . Risks, benefits and alternatives discussed in detail. I have asked if they have any questions or concerns and appropriately addressed them to the best of my ability .      > 45 minutes spent in direct patient care reviewing  the notes, lab data/ imaging , discussion with multidisciplinary team. All questions were addressed and answered to the best of my capacity .    Thank you for allowing me to participate in the care of your patient .      Ron Darling MD  Infectious Disease  [phone]

## 2019-06-15 NOTE — ED PROVIDER NOTE - ATTENDING CONTRIBUTION TO CARE
pt presents with generalized weakness, fevers, sacral wound and LLE redness.  Pt with baseline dementia and poor historian with no family at bedside.    PE:   mucous membranes dry  LLE erythema, warm, no edema    Admit for IVFs and hydration

## 2019-06-15 NOTE — ED PROVIDER NOTE - CARE PLAN
Principal Discharge DX:	Cellulitis of lower extremity, unspecified laterality  Secondary Diagnosis:	Poor historian

## 2019-06-15 NOTE — H&P ADULT - PROBLEM SELECTOR PLAN 1
Patient presenting with b/l lower extremity pain and erythema, found to be febrile  likely 2/2 cellulitis  -admit to medicine  -s/p vancomycin and ceftriaxone in ED  -continue with.... pending bcx and ucx  -US b/l dopplers neg for DVT   -Tylenol prn fevers  -trend wbc  -consult wound care for sacral ulcer as that may be an additional source of infection Patient presenting with weakness, difficulty ambulating, found to be febrile  likely 2/2 sacral decubitus ulcer vs cellulitis  -admit to medicine  -s/p vancomycin and ceftriaxone in ED  -continue with vancomycin and zosyn pending bcx and ucx  -US b/l dopplers neg for DVT   -Tylenol prn fevers  -trend wbc  -consult wound care for sacral ulcer, noted to unstageable  -ID Dr. Nolen consulted, will follow recommendations

## 2019-06-15 NOTE — H&P ADULT - ASSESSMENT
89 year old F with pmhx of dementia, CAD s/p CABG, pacemaker, HTN, HLD, Chronic back pain, anxiety, GERD, hypothyroidism presenting to ED from home with bilateral leg swelling. Found to be febrile on admission (Tmax 101.1), admitted for b/l cellulitis 89 year old F with pmhx of dementia, CAD s/p CABG, pacemaker, HTN, HLD, Chronic back pain, anxiety, GERD, hypothyroidism presenting to ED from home with weakness and difficulty ambulating. Found to be febrile (Tmax 101.1). Admitted for fever likely 2/2 sacral decubitus ulcer vs possible cellulitis

## 2019-06-15 NOTE — H&P ADULT - PROBLEM SELECTOR PLAN 2
Stable on arrival  -continue home medications of amlodipine 10mg and valsartan 80mg with hold parameters Stable on arrival  -continue home medications of amlodipine 10mg and losartan 50mg with hold parameters

## 2019-06-15 NOTE — H&P ADULT - PROBLEM SELECTOR PLAN 3
Patient's family reports taking the following medications as pain regime:  -will continue as..... Chronic   -will continue as Tylenol 325mg mild prn, Tylenol 650mg mod prn, Tramadol 50mg q8 severe prn

## 2019-06-15 NOTE — H&P ADULT - PROBLEM SELECTOR PLAN 4
Chronic  -continue risperidone 0.5mg Chronic  -as per pharmacy, patient last picked up Risperdal 0.5mg in February 2018  -son at bedside unclear of what medications patient takes  -will hold for now unclear family can confirm if patient takes this medication

## 2019-06-15 NOTE — ED ADULT TRIAGE NOTE - CHIEF COMPLAINT QUOTE
Per EMS pt is from home and reports bilateral leg swelling and pt lives with family, and report weakness and difficulty ambulating. Pt has history of dementia.

## 2019-06-16 LAB
ANION GAP SERPL CALC-SCNC: 8 MMOL/L — SIGNIFICANT CHANGE UP (ref 5–17)
BASOPHILS # BLD AUTO: 0.02 K/UL — SIGNIFICANT CHANGE UP (ref 0–0.2)
BASOPHILS NFR BLD AUTO: 0.2 % — SIGNIFICANT CHANGE UP (ref 0–2)
BUN SERPL-MCNC: 25 MG/DL — HIGH (ref 7–23)
CALCIUM SERPL-MCNC: 7.9 MG/DL — LOW (ref 8.5–10.1)
CHLORIDE SERPL-SCNC: 107 MMOL/L — SIGNIFICANT CHANGE UP (ref 96–108)
CO2 SERPL-SCNC: 26 MMOL/L — SIGNIFICANT CHANGE UP (ref 22–31)
CREAT SERPL-MCNC: 1.2 MG/DL — SIGNIFICANT CHANGE UP (ref 0.5–1.3)
EOSINOPHIL # BLD AUTO: 0.02 K/UL — SIGNIFICANT CHANGE UP (ref 0–0.5)
EOSINOPHIL NFR BLD AUTO: 0.2 % — SIGNIFICANT CHANGE UP (ref 0–6)
GLUCOSE SERPL-MCNC: 110 MG/DL — HIGH (ref 70–99)
HCT VFR BLD CALC: 27.9 % — LOW (ref 34.5–45)
HCT VFR BLD CALC: 28.1 % — LOW (ref 34.5–45)
HGB BLD-MCNC: 8.7 G/DL — LOW (ref 11.5–15.5)
HGB BLD-MCNC: 8.9 G/DL — LOW (ref 11.5–15.5)
IMM GRANULOCYTES NFR BLD AUTO: 0.7 % — SIGNIFICANT CHANGE UP (ref 0–1.5)
LYMPHOCYTES # BLD AUTO: 1.01 K/UL — SIGNIFICANT CHANGE UP (ref 1–3.3)
LYMPHOCYTES # BLD AUTO: 8.3 % — LOW (ref 13–44)
MCHC RBC-ENTMCNC: 29.3 PG — SIGNIFICANT CHANGE UP (ref 27–34)
MCHC RBC-ENTMCNC: 31.2 GM/DL — LOW (ref 32–36)
MCV RBC AUTO: 93.9 FL — SIGNIFICANT CHANGE UP (ref 80–100)
MONOCYTES # BLD AUTO: 0.95 K/UL — HIGH (ref 0–0.9)
MONOCYTES NFR BLD AUTO: 7.8 % — SIGNIFICANT CHANGE UP (ref 2–14)
NEUTROPHILS # BLD AUTO: 10.14 K/UL — HIGH (ref 1.8–7.4)
NEUTROPHILS NFR BLD AUTO: 82.8 % — HIGH (ref 43–77)
NRBC # BLD: 0 /100 WBCS — SIGNIFICANT CHANGE UP (ref 0–0)
PLATELET # BLD AUTO: 234 K/UL — SIGNIFICANT CHANGE UP (ref 150–400)
POTASSIUM SERPL-MCNC: 4.2 MMOL/L — SIGNIFICANT CHANGE UP (ref 3.5–5.3)
POTASSIUM SERPL-SCNC: 4.2 MMOL/L — SIGNIFICANT CHANGE UP (ref 3.5–5.3)
PROCALCITONIN SERPL-MCNC: 0.69 NG/ML — HIGH (ref 0–0.04)
RBC # BLD: 2.97 M/UL — LOW (ref 3.8–5.2)
RBC # FLD: 15.5 % — HIGH (ref 10.3–14.5)
SODIUM SERPL-SCNC: 141 MMOL/L — SIGNIFICANT CHANGE UP (ref 135–145)
WBC # BLD: 12.22 K/UL — HIGH (ref 3.8–10.5)
WBC # FLD AUTO: 12.22 K/UL — HIGH (ref 3.8–10.5)

## 2019-06-16 RX ORDER — MULTIVIT-MIN/FERROUS GLUCONATE 9 MG/15 ML
1 LIQUID (ML) ORAL DAILY
Refills: 0 | Status: CANCELLED | OUTPATIENT
Start: 2019-06-16 | End: 2019-06-18

## 2019-06-16 RX ORDER — HALOPERIDOL DECANOATE 100 MG/ML
1 INJECTION INTRAMUSCULAR EVERY 6 HOURS
Refills: 0 | Status: DISCONTINUED | OUTPATIENT
Start: 2019-06-16 | End: 2019-06-18

## 2019-06-16 RX ORDER — ASCORBIC ACID 60 MG
500 TABLET,CHEWABLE ORAL
Refills: 0 | Status: CANCELLED | OUTPATIENT
Start: 2019-06-16 | End: 2019-06-18

## 2019-06-16 RX ADMIN — PIPERACILLIN AND TAZOBACTAM 25 GRAM(S): 4; .5 INJECTION, POWDER, LYOPHILIZED, FOR SOLUTION INTRAVENOUS at 05:30

## 2019-06-16 RX ADMIN — PIPERACILLIN AND TAZOBACTAM 25 GRAM(S): 4; .5 INJECTION, POWDER, LYOPHILIZED, FOR SOLUTION INTRAVENOUS at 21:38

## 2019-06-16 RX ADMIN — PANTOPRAZOLE SODIUM 40 MILLIGRAM(S): 20 TABLET, DELAYED RELEASE ORAL at 05:31

## 2019-06-16 RX ADMIN — Medication 75 MICROGRAM(S): at 05:31

## 2019-06-16 RX ADMIN — PIPERACILLIN AND TAZOBACTAM 25 GRAM(S): 4; .5 INJECTION, POWDER, LYOPHILIZED, FOR SOLUTION INTRAVENOUS at 14:30

## 2019-06-16 RX ADMIN — ENOXAPARIN SODIUM 30 MILLIGRAM(S): 100 INJECTION SUBCUTANEOUS at 05:31

## 2019-06-16 RX ADMIN — ATORVASTATIN CALCIUM 20 MILLIGRAM(S): 80 TABLET, FILM COATED ORAL at 21:38

## 2019-06-16 RX ADMIN — Medication 81 MILLIGRAM(S): at 12:09

## 2019-06-16 RX ADMIN — Medication 100 MILLIGRAM(S): at 12:09

## 2019-06-16 RX ADMIN — Medication 250 MILLIGRAM(S): at 13:18

## 2019-06-16 RX ADMIN — HALOPERIDOL DECANOATE 1 MILLIGRAM(S): 100 INJECTION INTRAMUSCULAR at 17:55

## 2019-06-16 NOTE — PROGRESS NOTE ADULT - PROBLEM SELECTOR PLAN 1
Patient presenting with weakness, difficulty ambulating, found to be febrile  likely 2/2 sacral decubitus ulcer vs cellulitis  -admit to medicine  -s/p vancomycin and ceftriaxone in ED  -continue with vancomycin and zosyn pending bcx and ucx  -US b/l dopplers neg for DVT   -Tylenol prn fevers  -trend wbc  -consult wound care for sacral ulcer, noted to unstageable  -ID Dr. stapleton consulted, will follow recommendations

## 2019-06-16 NOTE — PHYSICAL THERAPY INITIAL EVALUATION ADULT - PERTINENT HX OF CURRENT PROBLEM, REHAB EVAL
Pt is 89 y.o. F who presented to ED from home with BLE weakness, poor historian, recently admitted 5/2019 for worsening LBP, (-) fx, sent to [facility] with continued weakness at d/c, admit for fever likely 2/2 sacral decubitus ulcer vs possible cellulitis.

## 2019-06-16 NOTE — PROGRESS NOTE ADULT - ASSESSMENT
89 year old F with pmhx of dementia, CAD s/p CABG, pacemaker, HTN, HLD, Chronic back pain, anxiety, GERD, hypothyroidism presenting to ED from home with weakness and difficulty ambulating. Found to be febrile (Tmax 101.1). Admitted for fever likely 2/2 sacral decubitus ulcer vs possible cellulitis

## 2019-06-16 NOTE — DIETITIAN INITIAL EVALUATION ADULT. - PROBLEM SELECTOR PLAN 3
Chronic   -will continue as Tylenol 325mg mild prn, Tylenol 650mg mod prn, Tramadol 50mg q8 severe prn

## 2019-06-16 NOTE — PHYSICAL THERAPY INITIAL EVALUATION ADULT - CRITERIA FOR SKILLED THERAPEUTIC INTERVENTIONS
predicted duration of therapy intervention/anticipated discharge recommendation/impairments found/functional limitations in following categories/therapy frequency/risk reduction/prevention/rehab potential

## 2019-06-16 NOTE — DIETITIAN INITIAL EVALUATION ADULT. - PROBLEM SELECTOR PLAN 2
Stable on arrival  -continue home medications of amlodipine 10mg and losartan 50mg with hold parameters

## 2019-06-16 NOTE — PHYSICAL THERAPY INITIAL EVALUATION ADULT - ADDITIONAL COMMENTS
Pt poor historian, full PLOF unknown, pt lives with family, as per nurse son is unable to care for pt due to functional decline.

## 2019-06-16 NOTE — DIETITIAN INITIAL EVALUATION ADULT. - PHYSICAL APPEARANCE
other (specify)/BMI 25.7 (Based on height 64" per previous RD evaluation ), Per nutrition focused physical exam moderate muscle wasting observed in clavicles, shoulders and moderate fat depletion in buccal region and orbital region other (specify)/BMI 25.7 (Based on height 64" per previous RD evaluation ), Per nutrition focused physical exam moderate muscle wasting observed in clavicles, shoulders and moderate fat depletion in orbital region, mild fat depletion in buccal region

## 2019-06-16 NOTE — DIETITIAN INITIAL EVALUATION ADULT. - PROBLEM SELECTOR PLAN 1
Patient presenting with weakness, difficulty ambulating, found to be febrile  likely 2/2 sacral decubitus ulcer vs cellulitis  -admit to medicine  -s/p vancomycin and ceftriaxone in ED  -continue with vancomycin and zosyn pending bcx and ucx  -US b/l dopplers neg for DVT   -Tylenol prn fevers  -trend wbc  -consult wound care for sacral ulcer, noted to unstageable  -ID Dr. Nolen consulted, will follow recommendations

## 2019-06-16 NOTE — CHART NOTE - NSCHARTNOTEFT_GEN_A_CORE
Upon Nutritional Assessment by the Registered Dietitian your patient was determined to meet criteria / has evidence of the following diagnosis/diagnoses:          [ ]  Mild Protein Calorie Malnutrition        [x ]  Moderate Protein Calorie Malnutrition        [ ] Severe Protein Calorie Malnutrition        [ ] Unspecified Protein Calorie Malnutrition        [ ] Underweight / BMI <19        [ ] Morbid Obesity / BMI > 40      Findings as based on:  •  Comprehensive nutrition assessment and consultation  •  Calorie counts (nutrient intake analysis)  •  Food acceptance and intake status from observations by staff  •  Follow up  •  Patient education  •  Intervention secondary to interdisciplinary rounds  •   concerns    Pt meets criteria for moderate protein calorie malnutrition in context of chronic illness as evidenced by:  *Moderate muscle wasting observed in clavicles/shoulders per nutrition focused physical exam  *Moderate fat depletion in orbital area & mild fat depletion buccal area per nutrition focused physical exam    Treatment:    The following diet has been recommended:  *Recommend liberalizing diet to Low Na with ensure enlive 8oz po BID  *Recommend MVI w/ minerals daily, 500mg Vit C BID    PROVIDER Section:     By signing this assessment you are acknowledging and agree with the diagnosis/diagnoses assigned by the Registered Dietitian    Comments:

## 2019-06-16 NOTE — DIETITIAN INITIAL EVALUATION ADULT. - OTHER INFO
Pt awake/confused at time of visit. Fever on admission likely secondary LE cellulitis vs sacral ulcers per H+P. Minimal po intake for breakfast today 6/16 per plate waste observation. No report N/V. BM 6/14. Noted suspected DTI to sacrum and right heel, left heel stage Per H+P son reports pt with good appetite pta, lives with family. Pt meets criteria for moderate protein calorie malnutrition in context of chronic illness.

## 2019-06-16 NOTE — DIETITIAN INITIAL EVALUATION ADULT. - PROBLEM SELECTOR PLAN 4
Chronic  -as per pharmacy, patient last picked up Risperdal 0.5mg in February 2018  -son at bedside unclear of what medications patient takes  -will hold for now unclear family can confirm if patient takes this medication

## 2019-06-16 NOTE — DIETITIAN INITIAL EVALUATION ADULT. - SIGNS/SYMPTOMS
as evidenced by moderate muscle wasting(shoulders/clavicles), moderate fat depletion(orbital/buccal)

## 2019-06-17 DIAGNOSIS — R41.0 DISORIENTATION, UNSPECIFIED: ICD-10-CM

## 2019-06-17 LAB
-  AMPICILLIN: SIGNIFICANT CHANGE UP
-  CIPROFLOXACIN: SIGNIFICANT CHANGE UP
-  LEVOFLOXACIN: SIGNIFICANT CHANGE UP
-  NITROFURANTOIN: SIGNIFICANT CHANGE UP
-  TETRACYCLINE: SIGNIFICANT CHANGE UP
-  VANCOMYCIN: SIGNIFICANT CHANGE UP
CULTURE RESULTS: SIGNIFICANT CHANGE UP
HCT VFR BLD CALC: 28.5 % — LOW (ref 34.5–45)
HGB BLD-MCNC: 8.9 G/DL — LOW (ref 11.5–15.5)
MCHC RBC-ENTMCNC: 29 PG — SIGNIFICANT CHANGE UP (ref 27–34)
MCHC RBC-ENTMCNC: 31.2 GM/DL — LOW (ref 32–36)
MCV RBC AUTO: 92.8 FL — SIGNIFICANT CHANGE UP (ref 80–100)
METHOD TYPE: SIGNIFICANT CHANGE UP
NRBC # BLD: 0 /100 WBCS — SIGNIFICANT CHANGE UP (ref 0–0)
ORGANISM # SPEC MICROSCOPIC CNT: SIGNIFICANT CHANGE UP
ORGANISM # SPEC MICROSCOPIC CNT: SIGNIFICANT CHANGE UP
PLATELET # BLD AUTO: 236 K/UL — SIGNIFICANT CHANGE UP (ref 150–400)
RBC # BLD: 3.07 M/UL — LOW (ref 3.8–5.2)
RBC # FLD: 15.5 % — HIGH (ref 10.3–14.5)
SPECIMEN SOURCE: SIGNIFICANT CHANGE UP
VANCOMYCIN TROUGH SERPL-MCNC: 13.1 UG/ML — SIGNIFICANT CHANGE UP (ref 10–20)
WBC # BLD: 13.21 K/UL — HIGH (ref 3.8–10.5)
WBC # FLD AUTO: 13.21 K/UL — HIGH (ref 3.8–10.5)

## 2019-06-17 RX ORDER — TRAMADOL HYDROCHLORIDE 50 MG/1
0 TABLET ORAL
Qty: 0 | Refills: 0 | DISCHARGE

## 2019-06-17 RX ORDER — RISPERIDONE 4 MG/1
0.5 TABLET ORAL AT BEDTIME
Refills: 0 | Status: DISCONTINUED | OUTPATIENT
Start: 2019-06-17 | End: 2019-06-18

## 2019-06-17 RX ORDER — ENOXAPARIN SODIUM 100 MG/ML
0 INJECTION SUBCUTANEOUS
Qty: 0 | Refills: 0 | DISCHARGE
Start: 2019-06-17

## 2019-06-17 RX ORDER — RISPERIDONE 4 MG/1
1 TABLET ORAL
Qty: 0 | Refills: 0 | DISCHARGE
Start: 2019-06-17

## 2019-06-17 RX ORDER — ASCORBIC ACID 60 MG
1 TABLET,CHEWABLE ORAL
Qty: 0 | Refills: 0 | DISCHARGE
Start: 2019-06-17

## 2019-06-17 RX ORDER — MULTIVIT-MIN/FERROUS GLUCONATE 9 MG/15 ML
1 LIQUID (ML) ORAL
Qty: 0 | Refills: 0 | DISCHARGE
Start: 2019-06-17

## 2019-06-17 RX ORDER — ACETAMINOPHEN 500 MG
1 TABLET ORAL
Qty: 0 | Refills: 0 | DISCHARGE
Start: 2019-06-17

## 2019-06-17 RX ADMIN — Medication 650 MILLIGRAM(S): at 15:59

## 2019-06-17 RX ADMIN — Medication 100 MILLIGRAM(S): at 12:07

## 2019-06-17 RX ADMIN — RISPERIDONE 0.5 MILLIGRAM(S): 4 TABLET ORAL at 21:36

## 2019-06-17 RX ADMIN — LOSARTAN POTASSIUM 50 MILLIGRAM(S): 100 TABLET, FILM COATED ORAL at 06:07

## 2019-06-17 RX ADMIN — Medication 250 MILLIGRAM(S): at 12:43

## 2019-06-17 RX ADMIN — TRAMADOL HYDROCHLORIDE 50 MILLIGRAM(S): 50 TABLET ORAL at 10:57

## 2019-06-17 RX ADMIN — TRAMADOL HYDROCHLORIDE 50 MILLIGRAM(S): 50 TABLET ORAL at 11:57

## 2019-06-17 RX ADMIN — ATORVASTATIN CALCIUM 20 MILLIGRAM(S): 80 TABLET, FILM COATED ORAL at 21:36

## 2019-06-17 RX ADMIN — PIPERACILLIN AND TAZOBACTAM 25 GRAM(S): 4; .5 INJECTION, POWDER, LYOPHILIZED, FOR SOLUTION INTRAVENOUS at 21:36

## 2019-06-17 RX ADMIN — Medication 81 MILLIGRAM(S): at 12:07

## 2019-06-17 RX ADMIN — ENOXAPARIN SODIUM 30 MILLIGRAM(S): 100 INJECTION SUBCUTANEOUS at 06:07

## 2019-06-17 RX ADMIN — AMLODIPINE BESYLATE 10 MILLIGRAM(S): 2.5 TABLET ORAL at 06:07

## 2019-06-17 RX ADMIN — PANTOPRAZOLE SODIUM 40 MILLIGRAM(S): 20 TABLET, DELAYED RELEASE ORAL at 06:06

## 2019-06-17 RX ADMIN — PIPERACILLIN AND TAZOBACTAM 25 GRAM(S): 4; .5 INJECTION, POWDER, LYOPHILIZED, FOR SOLUTION INTRAVENOUS at 06:06

## 2019-06-17 RX ADMIN — Medication 75 MICROGRAM(S): at 06:07

## 2019-06-17 RX ADMIN — PIPERACILLIN AND TAZOBACTAM 25 GRAM(S): 4; .5 INJECTION, POWDER, LYOPHILIZED, FOR SOLUTION INTRAVENOUS at 15:13

## 2019-06-17 RX ADMIN — Medication 650 MILLIGRAM(S): at 15:11

## 2019-06-17 NOTE — BEHAVIORAL HEALTH ASSESSMENT NOTE - NSBHCHARTREVIEWIMAGING_PSY_A_CORE FT
< from: US Duplex Venous Lower Ext Complete, Bilateral (06.15.19 @ 15:59) >    IMPRESSION:   Unremarkable ultrasound of the bilateral lower extremity   deep venous system.                      ESTUARDO JIMENEZ M.D., ATTENDING RADIOLOGIST  This document has been electronically signed. Toni 15 2019  4:02PM    < end of copied text >

## 2019-06-17 NOTE — BEHAVIORAL HEALTH ASSESSMENT NOTE - HPI (INCLUDE ILLNESS QUALITY, SEVERITY, DURATION, TIMING, CONTEXT, MODIFYING FACTORS, ASSOCIATED SIGNS AND SYMPTOMS)
Patient seen, evaluated and chart reviewed. 89 year old F with pmhx of dementia, CAD s/p CABG, pacemaker, HTN, HLD, Chronic back pain, anxiety, GERD, hypothyroidism presenting to ED from home with bilateral leg weakness. Patient is poor historian given dementia hx, son present at bedside. Patient recently admitted to [place] in 5/2019 for worsening lower back pain and was placed on pain meds. Lumbar xray, pelvis xray, and xray of L shoulder all negative for acute fracture. Evaluated by PT, recommended [facility]. Upon returning from [facility], patient continued to have weakness and required assistance with ambulation over the course of the last month. Son states that patient has a good appetite and lives with the family.  Brought patient into the ED today because patient was too weak to get herself to the bathroom. Denies fevers, chills, nausea, vomiting, abdominal pain, diarrhea.  Patient was admitted and treated for leg cellulitis. Patient was quite agitated initially, today significantly more composed, although continues to be difficult to engage and states that she does not need to interact with the undersigned. There is no significant evidence of cognitive impairment, although son reports worsening of memory for the last 6 months.

## 2019-06-17 NOTE — BEHAVIORAL HEALTH ASSESSMENT NOTE - SUMMARY
89 year old F with pmhx of dementia, CAD s/p CABG, pacemaker, HTN, HLD, Chronic back pain, anxiety, GERD, hypothyroidism presenting to ED from home with bilateral leg weakness. Patient is poor historian given dementia hx, son present at bedside.   Patient was admitted and treated for leg cellulitis. Patient was quite agitated initially, today significantly more composed, although continues to be difficult to engage and states that she does not need to interact with the undersigned. There is no significant evidence of cognitive impairment, although son reports worsening of memory for the last 6 months.    IMP: Delirium    REC; Risperdal 0.5 mg po at HS

## 2019-06-17 NOTE — BEHAVIORAL HEALTH ASSESSMENT NOTE - NSBHCHARTREVIEWINVESTIGATE_PSY_A_CORE FT
< from: 12 Lead ECG (06.15.19 @ 14:06) >    Ventricular Rate 79 BPM    Atrial Rate 79 BPM    P-R Interval 146 ms    QRS Duration 160 ms    Q-T Interval 440 ms    QTC Calculation(Bezet) 504 ms    P Axis 18 degrees    R Axis -79 degrees    T Axis 86 degrees    Diagnosis Line Atrial-sensed ventricular-paced rhythm  Abnormal ECG  When compared with ECG of 28-DEC-2017 10:00,  Vent. rate has decreased BY   3 BPM  Confirmed by GILBERTO BARRIOS ([id number]) on 6/16/2019 11:53:08 AM    < end of copied text >

## 2019-06-17 NOTE — BEHAVIORAL HEALTH ASSESSMENT NOTE - NSBHCHARTREVIEWLAB_PSY_A_CORE FT
8.9    13.21 )-----------( 236      ( 17 Jun 2019 07:17 )             28.5   06-16    141  |  107  |  25<H>  ----------------------------<  110<H>  4.2   |  26  |  1.20    Ca    7.9<L>      16 Jun 2019 07:02

## 2019-06-17 NOTE — ADVANCED PRACTICE NURSE CONSULT - RECOMMEDATIONS
1. Continue to paint with Cavilon daily and apply critic aide every shift   2. Maintain moisture free environment  3. No diapers  4. T&P Q2H
1. Paint DTPI heel with cavilon daily and offload  2. Protect blood blister at anterior gaiter with 4x4, ne QOD

## 2019-06-17 NOTE — ADVANCED PRACTICE NURSE CONSULT - ASSESSMENT
patient is a 89yo Female with dementia  she is presenting with a right heel deep tissue pressure injury 2 x 2 injury is indurated, left heel is resolved and bilaterally heels are offloaded with z-floats left anterior gaiter with blood blister 6x3 she became  very distraught during assessment screaming don't touch me get away from me causing the assessment to end yesterday. Today I was able to reassess she was very calm. This patients presents with a DTPI at sacral region that has evolved since arriving and is now 2 x 3 black, stable no drainage, odor, tenderness, warmth noted    RN present at assessment and educated in care of this patients skin care
patient is a 89yo Female with dementia  she is presenting with a right heel deep tissue pressure injury 2 x 2 injury is indurated, left heel is resolved and bilaterally heels are offloaded with z-floats left anterior gaiter with blood blister 6x3 she became  very distraught during assessment screaming don't touch me get away from me causing the assessment to end.   I was not able to visualize sacral DTPI vs unstageable and asked RN to please call when patient lucid.     Rn educated in this patients skin care   MD made aware

## 2019-06-17 NOTE — GOALS OF CARE CONVERSATION - PERSONAL ADVANCE DIRECTIVE - CONVERSATION DETAILS
Met with son HCP [place] at bedside to discuss do not resuscitate do not intubate.A complete explaination was given and son stated that he fully understood.I explained that form can be changed at any time. Maura left on chart for MD signature.DNR order in affect.

## 2019-06-17 NOTE — BEHAVIORAL HEALTH ASSESSMENT NOTE - NSBHCHARTREVIEWVS_PSY_A_CORE FT
Vital Signs Last 24 Hrs  T(C): 36.7 (17 Jun 2019 07:39), Max: 36.9 (16 Jun 2019 23:18)  T(F): 98 (17 Jun 2019 07:39), Max: 98.5 (16 Jun 2019 23:18)  HR: 70 (17 Jun 2019 07:39) (64 - 75)  BP: 116/56 (17 Jun 2019 07:39) (116/56 - 123/52)  BP(mean): --  RR: 16 (17 Jun 2019 07:39) (16 - 16)  SpO2: 91% (17 Jun 2019 07:39) (91% - 92%)

## 2019-06-18 ENCOUNTER — TRANSCRIPTION ENCOUNTER (OUTPATIENT)
Age: 84
End: 2019-06-18

## 2019-06-18 VITALS
HEART RATE: 70 BPM | TEMPERATURE: 98 F | SYSTOLIC BLOOD PRESSURE: 120 MMHG | OXYGEN SATURATION: 92 % | RESPIRATION RATE: 17 BRPM | DIASTOLIC BLOOD PRESSURE: 61 MMHG

## 2019-06-18 LAB
HCT VFR BLD CALC: 27.3 % — LOW (ref 34.5–45)
HGB BLD-MCNC: 8.7 G/DL — LOW (ref 11.5–15.5)
MCHC RBC-ENTMCNC: 29.6 PG — SIGNIFICANT CHANGE UP (ref 27–34)
MCHC RBC-ENTMCNC: 31.9 GM/DL — LOW (ref 32–36)
MCV RBC AUTO: 92.9 FL — SIGNIFICANT CHANGE UP (ref 80–100)
NRBC # BLD: 0 /100 WBCS — SIGNIFICANT CHANGE UP (ref 0–0)
PLATELET # BLD AUTO: 239 K/UL — SIGNIFICANT CHANGE UP (ref 150–400)
RBC # BLD: 2.94 M/UL — LOW (ref 3.8–5.2)
RBC # FLD: 15.1 % — HIGH (ref 10.3–14.5)
WBC # BLD: 11.84 K/UL — HIGH (ref 3.8–10.5)
WBC # FLD AUTO: 11.84 K/UL — HIGH (ref 3.8–10.5)

## 2019-06-18 PROCEDURE — 97162 PT EVAL MOD COMPLEX 30 MIN: CPT

## 2019-06-18 PROCEDURE — 84550 ASSAY OF BLOOD/URIC ACID: CPT

## 2019-06-18 PROCEDURE — 87186 SC STD MICRODIL/AGAR DIL: CPT

## 2019-06-18 PROCEDURE — 93970 EXTREMITY STUDY: CPT

## 2019-06-18 PROCEDURE — 80053 COMPREHEN METABOLIC PANEL: CPT

## 2019-06-18 PROCEDURE — 80048 BASIC METABOLIC PNL TOTAL CA: CPT

## 2019-06-18 PROCEDURE — 71045 X-RAY EXAM CHEST 1 VIEW: CPT

## 2019-06-18 PROCEDURE — 81001 URINALYSIS AUTO W/SCOPE: CPT

## 2019-06-18 PROCEDURE — 99285 EMERGENCY DEPT VISIT HI MDM: CPT | Mod: 25

## 2019-06-18 PROCEDURE — 85014 HEMATOCRIT: CPT

## 2019-06-18 PROCEDURE — 87086 URINE CULTURE/COLONY COUNT: CPT

## 2019-06-18 PROCEDURE — 80202 ASSAY OF VANCOMYCIN: CPT

## 2019-06-18 PROCEDURE — 73120 X-RAY EXAM OF HAND: CPT | Mod: 26,LT

## 2019-06-18 PROCEDURE — 96365 THER/PROPH/DIAG IV INF INIT: CPT

## 2019-06-18 PROCEDURE — 83605 ASSAY OF LACTIC ACID: CPT

## 2019-06-18 PROCEDURE — 36415 COLL VENOUS BLD VENIPUNCTURE: CPT

## 2019-06-18 PROCEDURE — 85018 HEMOGLOBIN: CPT

## 2019-06-18 PROCEDURE — 87040 BLOOD CULTURE FOR BACTERIA: CPT

## 2019-06-18 PROCEDURE — 85027 COMPLETE CBC AUTOMATED: CPT

## 2019-06-18 PROCEDURE — 73120 X-RAY EXAM OF HAND: CPT

## 2019-06-18 PROCEDURE — 84145 PROCALCITONIN (PCT): CPT

## 2019-06-18 PROCEDURE — 93005 ELECTROCARDIOGRAM TRACING: CPT

## 2019-06-18 PROCEDURE — 96367 TX/PROPH/DG ADDL SEQ IV INF: CPT

## 2019-06-18 RX ORDER — AMOXICILLIN 250 MG/5ML
1 SUSPENSION, RECONSTITUTED, ORAL (ML) ORAL
Qty: 0 | Refills: 0 | DISCHARGE
Start: 2019-06-18

## 2019-06-18 RX ORDER — AMOXICILLIN 250 MG/5ML
500 SUSPENSION, RECONSTITUTED, ORAL (ML) ORAL
Refills: 0 | Status: DISCONTINUED | OUTPATIENT
Start: 2019-06-18 | End: 2019-06-18

## 2019-06-18 RX ADMIN — Medication 40 MILLIGRAM(S): at 13:59

## 2019-06-18 RX ADMIN — AMLODIPINE BESYLATE 10 MILLIGRAM(S): 2.5 TABLET ORAL at 05:17

## 2019-06-18 RX ADMIN — Medication 75 MICROGRAM(S): at 05:17

## 2019-06-18 RX ADMIN — LOSARTAN POTASSIUM 50 MILLIGRAM(S): 100 TABLET, FILM COATED ORAL at 05:17

## 2019-06-18 RX ADMIN — PIPERACILLIN AND TAZOBACTAM 25 GRAM(S): 4; .5 INJECTION, POWDER, LYOPHILIZED, FOR SOLUTION INTRAVENOUS at 05:17

## 2019-06-18 RX ADMIN — ENOXAPARIN SODIUM 30 MILLIGRAM(S): 100 INJECTION SUBCUTANEOUS at 05:17

## 2019-06-18 RX ADMIN — Medication 81 MILLIGRAM(S): at 13:55

## 2019-06-18 RX ADMIN — PANTOPRAZOLE SODIUM 40 MILLIGRAM(S): 20 TABLET, DELAYED RELEASE ORAL at 05:16

## 2019-06-18 RX ADMIN — Medication 100 MILLIGRAM(S): at 13:56

## 2019-06-18 NOTE — CHART NOTE - NSCHARTNOTEFT_GEN_A_CORE
Do you have Advance Directives (HCP / LV / Organ donation / Documentation of oral advance Directive):   (  x  )  yes    (      )    NO                                                                            Do you have LV - Living will :                                                                                                                                             (    )  yes    (  x    )   No    Do you have HCP - Health Care Proxy:                                                                                                                            (  x   )  yes   (       ) N0    Do you have DNR- Do Not Resuscitate :                                                                                                                           (   x   )  yes  (        )  No    Do you have DNI- Do Not intubate  :                                                                                                                               (   x   )  yes   (       ) No    Do you have MOLST - Medical orders for Life sustaining treatment  :                                                                    (    x  ) yes    (       ) No    Decision Maker :  (     ) Patient     (    x  )  HCA   (     ) Public Health Law Surrogate     (      ) Surrogate  (       ) Guardian    Goals of Care :  (      )   Complete Care     (       ) No Limitations                              (       )   Comfort Care       (       )  Hospice                               (    x  )   Limited medical Intervention / s    Medical Interventions :   (        )   CPR       (   x     )  DNR                                               (        )  Intubation with MV - Mechanical Ventilation  (      ) BIPAP/CPAP    (  x       )   DNI                                               (         )  Artificial Nutrition -  IVF, TPN / PPN, Tube Feeds             (     x    )   No Feeding Tube                                                (     x   ) Use Antibiotics                         (          ) No Antibiotics                                                (     x    ) Blood and Blood Products     (         )   No Blood or Blood products                                                (          )  Dialysis                                    (    x     )  No Dialysis                                                (          )  Medical Management only  (         )  No Invasive Interventions or Surgery  Time spent :                        (    x   ) up to 30 minutes                       (    x       )   more than 30 minutes  Goals of care reviewed and discussed

## 2019-06-18 NOTE — DISCHARGE NOTE PROVIDER - NSDCCPCAREPLAN_GEN_ALL_CORE_FT
PRINCIPAL DISCHARGE DIAGNOSIS  Diagnosis: Acute UTI  Assessment and Plan of Treatment: follow up with rhab MD dr. POMPA      SECONDARY DISCHARGE DIAGNOSES  Diagnosis: Poor historian  Assessment and Plan of Treatment:

## 2019-06-18 NOTE — DISCHARGE NOTE NURSING/CASE MANAGEMENT/SOCIAL WORK - NSDCDPATPORTLINK_GEN_ALL_CORE
You can access the [facility], offered by [facility], by registering with the following website: [email or web address]

## 2019-06-18 NOTE — PROGRESS NOTE ADULT - SUBJECTIVE AND OBJECTIVE BOX
PETER FREY is a 89yFemale , patient examined and chart reviewed.     INTERVAL HPI/ OVERNIGHT EVENTS:   Afebrile. confused.     PAST MEDICAL & SURGICAL HISTORY:  Anxiety  GERD (gastroesophageal reflux disease)  Retention of urine  Agitation  Neck pain  Hyperlipidemia, unspecified hyperlipidemia type  Essential hypertension  Chronic back pain  CAD (coronary artery disease)  Cataract: right eye  Hypothyroid  DVT (deep venous thrombosis), right  Pacemaker  S/P coronary artery bypass graft x 1      For details regarding the patient's social history, family history, and other miscellaneous elements, please refer the initial infectious diseases consultation and/or the admitting history and physical examination for this admission.    ROS:  Unable to obtain due to : confusion      Current inpatient medications :    ANTIBIOTICS/RELEVANT:  piperacillin/tazobactam IVPB. 3.375 Gram(s) IV Intermittent every 8 hours  vancomycin  IVPB 1000 milliGRAM(s) IV Intermittent every 24 hours    acetaminophen   Tablet .. 650 milliGRAM(s) Oral every 6 hours PRN  acetaminophen   Tablet .. 325 milliGRAM(s) Oral every 8 hours PRN  allopurinol 100 milliGRAM(s) Oral daily  amLODIPine   Tablet 10 milliGRAM(s) Oral daily  aspirin  chewable 81 milliGRAM(s) Oral daily  atorvastatin 20 milliGRAM(s) Oral at bedtime  enoxaparin Injectable 30 milliGRAM(s) SubCutaneous every 24 hours  haloperidol    Injectable 1 milliGRAM(s) IntraMuscular every 6 hours PRN  levothyroxine 75 MICROGram(s) Oral daily  losartan 50 milliGRAM(s) Oral daily  pantoprazole    Tablet 40 milliGRAM(s) Oral before breakfast  traMADol 50 milliGRAM(s) Oral every 8 hours PRN      Objective:    06-15 @ 07:  -   @ 07:00  --------------------------------------------------------  IN: 0 mL / OUT: 300 mL / NET: -300 mL      T(C): 36.9 (19 @ 23:18), Max: 37.4 (19 @ 07:37)  HR: 75 (19 @ 23:18) (66 - 79)  BP: 120/61 (19 @ 23:18) (96/57 - 120/61)  RR: 16 (19 @ 23:18) (16 - 16)  SpO2: 92% (19 @ 23:18) (90% - 92%)      Physical Exam:  General: no acute distress  Eyes: sclera anicteric, pupils equal and reactive to light  ENMT: buccal mucosa moist, pharynx not injected  Neck: supple, trachea midline  Lungs: clear, no wheeze/rhonchi  Cardiovascular: regular rate and rhythm, S1 S2  Abdomen: soft, nontender, no organomegaly present, bowel sounds normal  Neurological: alert and oriented x3, Cranial Nerves II-XII grossly intact  Skin: Sacral DTI and right heel DTI  Lymph Nodes: no palpable cervical/supraclavicular lymph nodes enlargements  Extremities: chronic changes      LABS:     Complete Blood Count + Automated Diff (19 @ 07:02)    WBC Count: 12.22 K/uL    RBC Count: 2.97 M/uL    Hemoglobin: 8.7 g/dL    Hematocrit: 27.9 %    Mean Cell Volume: 93.9 fl    Mean Cell Hemoglobin: 29.3 pg    Mean Cell Hemoglobin Conc: 31.2 gm/dL    Red Cell Distrib Width: 15.5 %    Platelet Count - Automated: 234 K/uL    Auto Neutrophil #: 10.14 K/uL    Auto Lymphocyte #: 1.01 K/uL    Auto Monocyte #: 0.95 K/uL    Auto Eosinophil #: 0.02 K/uL    Auto Basophil #: 0.02 K/uL    Auto Neutrophil %: 82.8: Differential percentages must be correlated with absolute numbers for  clinical significance. %    Auto Lymphocyte %: 8.3 %    Auto Monocyte %: 7.8 %    Auto Eosinophil %: 0.2 %    Auto Basophil %: 0.2 %    Auto Immature Granulocyte %: 0.7 %    Nucleated RBC: 0 /100 WBCs          141  |  107  |  25<H>  ----------------------------<  110<H>  4.2   |  26  |  1.20    Ca    7.9<L>      2019 07:02    TPro  7.4  /  Alb  3.2<L>  /  TBili  0.6  /  DBili  x   /  AST  19  /  ALT  25  /  AlkPhos  110  -15    Urinalysis Basic - ( 15 Toni 2019 14:26 )    Color: Yellow / Appearance: Clear / S.005 / pH: x  Gluc: x / Ketone: Negative  / Bili: Negative / Urobili: Negative   Blood: x / Protein: 25 mg/dL / Nitrite: Negative   Leuk Esterase: Small / RBC: 0-2 /HPF / WBC 6-10   Sq Epi: x / Non Sq Epi: Occasional / Bacteria: Few      MICROBIOLOGY:    Culture - Urine (collected 15 Toni 2019 17:26)  Source: .Urine Clean Catch (Midstream)  Preliminary Report (2019 19:27):    >100,000 CFU/ml Enterococcus species    Culture - Blood (collected 15 Toni 2019 17:07)  Source: .Blood Blood  Preliminary Report (2019 18:01):    No growth to date.    Culture - Blood (collected 15 Toni 2019 17:07)  Source: .Blood Blood  Preliminary Report (2019 18:01):    No growth to date.    RADIOLOGY & ADDITIONAL STUDIES:    EXAM:  XR CHEST PORTABLE URGENT 1V                            PROCEDURE DATE:  06/15/2019          INTERPRETATION:  History: Fever    Portable radiograph of the chest is performed. The study of 2017 is   available for comparison.    Sternal wires and left-sided pacemaker are again seen. The heart is not   enlarged. Mild blunting of left CP angle appears stable from the prior   study. There is linear atelectasis or scar at the left lung base. The   right lung is unremarkable. There is osteopenia of bony structures.    Impression: No active pulmonary disease. Stable mild blunting of left CP   angle  Status post CABG  Pacemaker          Assessment :  89 year old F with pmhx of dementia, CAD s/p CABG, pacemaker, HTN, HLD, Chronic back pain, anxiety, GERD, hypothyroidism admitted with  fever and weakness. WBC 13K. UA +. Likely UTI. No evidence for sacral decub infection.       Plan :   Cont Zosyn  Dc Vancomcyin  Fu cultures  Trend temps and wbc  Asp precautions        Continue with present regiment.  Appropriate use of antibiotics and adverse effects reviewed.      I have discussed the above plan of care with patient/ family in detail. They expressed understanding of the  treatment plan . Risks, benefits and alternatives discussed in detail. I have asked if they have any questions or concerns and appropriately addressed them to the best of my ability .    > 35 minutes were spent in direct patient care reviewing notes, medications ,labs data/ imaging , discussion with multidisciplinary team.    Thank you for allowing me to participate in care of your patient .    Ron Dalring MD  Infectious Disease  [phone]
ID Progress note     Name: PETER FREY  Age: 89y  Gender: Female  MRN: [id number]    Interval History-- Events noted, awake and comfortable . Afebrile . No new complaints . Still confused   Notes reviewed    Past Medical History--  Anxiety  GERD (gastroesophageal reflux disease)  Retention of urine  Agitation  Neck pain  Hyperlipidemia, unspecified hyperlipidemia type  Essential hypertension  Chronic back pain  CAD (coronary artery disease)  Cataract  Hypothyroid  DVT (deep venous thrombosis), right  Pacemaker  S/P coronary artery bypass graft x 1      For details regarding the patient's social history, family history, and other miscellaneous elements, please refer the initial infectious diseases consultation and/or the admitting history and physical examination for this admission.    Allergies--  Allergies    No Known Allergies    Intolerances        Medications--  Antibiotics:  piperacillin/tazobactam IVPB. 3.375 Gram(s) IV Intermittent every 8 hours  vancomycin  IVPB 1000 milliGRAM(s) IV Intermittent every 24 hours    Immunologic:    Other:  acetaminophen   Tablet .. PRN  acetaminophen   Tablet .. PRN  allopurinol  amLODIPine   Tablet  aspirin  chewable  atorvastatin  enoxaparin Injectable  haloperidol    Injectable PRN  levothyroxine  losartan  pantoprazole    Tablet  traMADol PRN      Review of Systems--  Review of systems unable to be obtained secondary to clinical condition.    Physical Examination--    Vital Signs: T(F): 98 (06-17-19 @ 07:39), Max: 98.5 (06-16-19 @ 23:18)  HR: 70 (06-17-19 @ 07:39)  BP: 116/56 (06-17-19 @ 07:39)  RR: 16 (06-17-19 @ 07:39)  SpO2: 91% (06-17-19 @ 07:39)  Wt(kg): --  General: Nontoxic-appearing Female in no acute distress.  HEENT: AT/NC. PERRL. EOMI. Anicteric. Conjunctiva pink and moist. Oropharynx clear. Dentition fair.  Neck: Not rigid. No sense of mass.  Nodes: None palpable.  Lungs: Clear bilaterally without rales, wheezing or rhonchi  Heart: Regular rate and rhythm. No Murmur. No rub. No gallop. No palpable thrill.  Abdomen: Bowel sounds present and normoactive. Soft. Nondistended. Nontender. No sense of mass. No organomegaly.  Back: No spinal tenderness. No costovertebral angle tenderness.   Extremities: No cyanosis or clubbing. No edema.   Skin: Warm. Dry. Good turgor. No rash. No vasculitic stigmata.  Psychiatric: Appropriate affect and mood for situation.         Laboratory Studies--  CBC                        8.9    13.21 )-----------( 236      ( 17 Jun 2019 07:17 )             28.5       Chemistries  06-16    141  |  107  |  25<H>  ----------------------------<  110<H>  4.2   |  26  |  1.20    Ca    7.9<L>      16 Jun 2019 07:02    TPro  7.4  /  Alb  3.2<L>  /  TBili  0.6  /  DBili  x   /  AST  19  /  ALT  25  /  AlkPhos  110  06-15    Vancomycin Level, Trough -Pre 3rd Dose, order if dosed q24/36/48h (06.17.19 @ 12:07)    Vancomycin Level, Trough: 13.1:     Procalcitonin, Serum (06.16.19 @ 15:35)    Procalcitonin, Serum: 0.69:     Lactate, Blood: 0.9 mmol/L (06-15-19 @ 14:26)        Culture Data    Culture - Urine (collected 15 Toni 2019 17:26)  Source: .Urine Clean Catch (Midstream)  Preliminary Report (16 Jun 2019 19:27):    >100,000 CFU/ml Enterococcus species    Culture - Blood (collected 15 Toni 2019 17:07)  Source: .Blood Blood  Preliminary Report (16 Jun 2019 18:01):    No growth to date.    Culture - Blood (collected 15 Toni 2019 17:07)  Source: .Blood Blood  Preliminary Report (16 Jun 2019 18:01):    No growth to date.      Radiology:    Xray Chest 1 View- PORTABLE-Urgent (06.15.19 @ 14:18) >  Impression: No active pulmonary disease. Stable mild blunting of left CP   angle  Status post CABG  Pacemaker      Assessment--  89 year old F with pmhx of dementia, CAD s/p CABG, pacemaker, HTN, HLD, Chronic back pain, anxiety, GERD, hypothyroidism admitted with  fever and weakness. WBC 13K. UA +. Likely UTI. No evidence for sacral decub infection.       Plan :   Cont Zosyn probably change to po amoxil 500 mg bid x 4 more days from am, if urine cs sensitive   Dc Vancomcyin  Fu cultures  Trend temps and wbc  Asp precautions      Continue with present regiment.  Appropriate use of antibiotics and adverse effects reviewed.    I have discussed the above plan of care with patient's family in detail. They expressed understanding of the treatment plan . Risks, benefits and alternatives discussed in detail. I have asked if they have any questions or concerns and appropriately addressed them to the best of my ability .      > 25 minutes spent in direct patient care reviewing  the notes, lab data/ imaging , discussion with multidisciplinary team. All questions were addressed and answered to the best of my capacity .    Thank you for allowing me to participate in the care of your patient .        Kev Lyn MD  [phone]
ID Progress note     Name: PETER FREY  Age: 89y  Gender: Female  MRN: [id number]    Interval History-- Events noted, doing well, afebrile . No new complaints .   Notes reviewed    Past Medical History--  Anxiety  GERD (gastroesophageal reflux disease)  Retention of urine  Agitation  Neck pain  Hyperlipidemia, unspecified hyperlipidemia type  Essential hypertension  Chronic back pain  CAD (coronary artery disease)  Cataract  Hypothyroid  DVT (deep venous thrombosis), right  Pacemaker  S/P coronary artery bypass graft x 1      For details regarding the patient's social history, family history, and other miscellaneous elements, please refer the initial infectious diseases consultation and/or the admitting history and physical examination for this admission.    Allergies--  Allergies    No Known Allergies    Intolerances        Medications--  Antibiotics:  piperacillin/tazobactam IVPB. 3.375 Gram(s) IV Intermittent every 8 hours    Immunologic:    Other:  acetaminophen   Tablet .. PRN  acetaminophen   Tablet .. PRN  allopurinol  amLODIPine   Tablet  aspirin  chewable  atorvastatin  enoxaparin Injectable  haloperidol    Injectable PRN  levothyroxine  losartan  pantoprazole    Tablet  risperiDONE   Tablet  traMADol PRN      Review of Systems--  Review of systems unable to be obtained secondary to clinical condition.    Physical Examination--    Vital Signs: T(F): 98.5 (06-18-19 @ 07:41), Max: 99.3 (06-17-19 @ 23:30)  HR: 67 (06-18-19 @ 07:41)  BP: 123/56 (06-18-19 @ 07:41)  RR: 16 (06-18-19 @ 07:41)  SpO2: 92% (06-18-19 @ 07:41)  Wt(kg): --  General: Nontoxic-appearing Female in no acute distress.  HEENT: AT/NC. PERRL. EOMI. Anicteric. Conjunctiva pink and moist. Oropharynx clear. Dentition fair.  Neck: Not rigid. No sense of mass.  Nodes: None palpable.  Lungs: Clear bilaterally without rales, wheezing or rhonchi  Heart: Regular rate and rhythm. No Murmur. No rub. No gallop. No palpable thrill.  Abdomen: Bowel sounds present and normoactive. Soft. Nondistended. Nontender. No sense of mass. No organomegaly.  Back: No spinal tenderness. No costovertebral angle tenderness.   Extremities: No cyanosis or clubbing. No edema.   Skin: Warm. Dry. Good turgor. No rash. No vasculitic stigmata.  Psychiatric: Appropriate affect and mood for situation.     Laboratory Studies--  CBC                        8.7    11.84 )-----------( 239      ( 18 Jun 2019 06:41 )             27.3       Chemistries          Culture Data    Culture - Urine (06.15.19 @ 17:26)    -  Ampicillin: S <=2 Predicts results to ampicillin/sulbactam, amoxacillin-clavulanate and  piperacillin-tazobactam.    -  Ciprofloxacin: S <=1    -  Levofloxacin: S <=1    -  Tetra/Doxy: R >8    -  Nitrofurantoin: S <=32 Should not be used to treat pyelonephritis.    -  Vancomycin: S 2    Specimen Source: .Urine Clean Catch (Midstream)    Culture Results:   >100,000 CFU/ml Enterococcus faecalis    Organism Identification: Enterococcus faecalis    Organism: Enterococcus faecalis    Method Type: JIE      Culture - Blood (collected 15 Toni 2019 17:07)  Source: .Blood Blood  Preliminary Report (16 Jun 2019 18:01):    No growth to date.    Culture - Blood (collected 15 Toni 2019 17:07)  Source: .Blood Blood  Preliminary Report (16 Jun 2019 18:01):    No growth to date.      Radiology:    Xray Chest 1 View- PORTABLE-Urgent (06.15.19 @ 14:18) >  Impression: No active pulmonary disease. Stable mild blunting of left CP   angle  Status post CABG  Pacemaker    US Duplex Venous Lower Ext Complete, Bilateral (06.15.19 @ 15:59) >    IMPRESSION:   Unremarkable ultrasound of the bilateral lower extremity   deep venous system.          Assessment--  89 year old F with pmhx of dementia, CAD s/p CABG, pacemaker, HTN, HLD, Chronic back pain, anxiety, GERD, hypothyroidism admitted with  fever and weakness. WBC 13K. UA +. Likely UTI. No evidence for sacral decub infection.       Plan :   will change to po amoxil 500 mg bid x 4 more days as E fecalis is PCN sensitive   DC planning   Trend temps and wbc  Asp precautions      Continue with present regiment.  Appropriate use of antibiotics and adverse effects reviewed.    I have discussed the above plan of care with patient's family in detail. They expressed understanding of the treatment plan . Risks, benefits and alternatives discussed in detail. I have asked if they have any questions or concerns and appropriately addressed them to the best of my ability.    > 15 minutes spent in direct patient care reviewing  the notes, lab data/ imaging , discussion with multidisciplinary team. All questions were addressed and answered to the best of my capacity .    Thank you for allowing me to participate in the care of your patient .        Kev Lyn MD  [phone]
Patient is a 89y old  Female who presents with a chief complaint of fever (15 Toni 2019 16:24)      INTERVAL /OVERNIGHT EVENTS: agitated    MEDICATIONS  (STANDING):  allopurinol 100 milliGRAM(s) Oral daily  amLODIPine   Tablet 10 milliGRAM(s) Oral daily  aspirin  chewable 81 milliGRAM(s) Oral daily  atorvastatin 20 milliGRAM(s) Oral at bedtime  enoxaparin Injectable 30 milliGRAM(s) SubCutaneous every 24 hours  levothyroxine 75 MICROGram(s) Oral daily  losartan 50 milliGRAM(s) Oral daily  pantoprazole    Tablet 40 milliGRAM(s) Oral before breakfast  piperacillin/tazobactam IVPB. 3.375 Gram(s) IV Intermittent every 8 hours  vancomycin  IVPB 1000 milliGRAM(s) IV Intermittent every 24 hours    MEDICATIONS  (PRN):  acetaminophen   Tablet .. 650 milliGRAM(s) Oral every 6 hours PRN Temp greater or equal to 38C (100.4F), Moderate Pain (4 - 6)  acetaminophen   Tablet .. 325 milliGRAM(s) Oral every 8 hours PRN Mild Pain (1 - 3)  traMADol 50 milliGRAM(s) Oral every 8 hours PRN Severe Pain (7 - 10)      Allergies    No Known Allergies    Intolerances        REVIEW OF SYSTEMS: unable to obtain    Vital Signs Last 24 Hrs  T(C): 37.4 (2019 07:37), Max: 37.4 (15 Toni 2019 19:51)  T(F): 99.4 (2019 07:37), Max: 99.4 (2019 07:37)  HR: 79 (2019 14:06) (61 - 79)  BP: 96/57 (2019 14:06) (96/57 - 149/69)  BP(mean): --  RR: 16 (2019 07:37) (16 - 18)  SpO2: 90% (2019 14:06) (90% - 98%)    PHYSICAL EXAM:  GENERAL: NAD, well-groomed, well-developed  HEAD:  Atraumatic, Normocephalic  EYES: EOMI, PERRLA, conjunctiva and sclera clear  ENMT: No tonsillar erythema, exudates, or enlargement; Moist mucous membranes, Good dentition, No lesions  NECK: Supple, No JVD, Normal thyroid  NERVOUS SYSTEM:  Alert & awake  CHEST/LUNG: Clear to auscultation bilaterally; No rales, rhonchi, wheezing, or rubs  HEART: Regular rate and rhythm; No murmurs, rubs, or gallops  ABDOMEN: Soft, Nontender, Nondistended; Bowel sounds present  EXTREMITIES:  2+ Peripheral Pulses, No clubbing, cyanosis, or edema  LYMPH: No lymphadenopathy noted  SKIN: No rashes or lesions    LABS:                        8.9    x     )-----------( x        ( 2019 15:35 )             28.1     2019 07:02    141    |  107    |  25     ----------------------------<  110    4.2     |  26     |  1.20     Ca    7.9        2019 07:02        Urinalysis Basic - ( 15 Toni 2019 14:26 )    Color: Yellow / Appearance: Clear / S.005 / pH: x  Gluc: x / Ketone: Negative  / Bili: Negative / Urobili: Negative   Blood: x / Protein: 25 mg/dL / Nitrite: Negative   Leuk Esterase: Small / RBC: 0-2 /HPF / WBC 6-10   Sq Epi: x / Non Sq Epi: Occasional / Bacteria: Few      CAPILLARY BLOOD GLUCOSE          RADIOLOGY & ADDITIONAL TESTS:    Notes Reviewed:  [x ] YES  [ ] NO    Care Discussed with Consultants/Other Providers [x ] YES  [ ] NO
Patient is a 89y old  Female who presents with a chief complaint of fever (2019 20:26)      INTERVAL /OVERNIGHT EVENTS: pleasant today    MEDICATIONS  (STANDING):  allopurinol 100 milliGRAM(s) Oral daily  amLODIPine   Tablet 10 milliGRAM(s) Oral daily  aspirin  chewable 81 milliGRAM(s) Oral daily  atorvastatin 20 milliGRAM(s) Oral at bedtime  enoxaparin Injectable 30 milliGRAM(s) SubCutaneous every 24 hours  levothyroxine 75 MICROGram(s) Oral daily  losartan 50 milliGRAM(s) Oral daily  pantoprazole    Tablet 40 milliGRAM(s) Oral before breakfast  piperacillin/tazobactam IVPB. 3.375 Gram(s) IV Intermittent every 8 hours  vancomycin  IVPB 1000 milliGRAM(s) IV Intermittent every 24 hours    MEDICATIONS  (PRN):  acetaminophen   Tablet .. 650 milliGRAM(s) Oral every 6 hours PRN Temp greater or equal to 38C (100.4F), Moderate Pain (4 - 6)  acetaminophen   Tablet .. 325 milliGRAM(s) Oral every 8 hours PRN Mild Pain (1 - 3)  haloperidol    Injectable 1 milliGRAM(s) IntraMuscular every 6 hours PRN Agitation  traMADol 50 milliGRAM(s) Oral every 8 hours PRN Severe Pain (7 - 10)      Allergies    No Known Allergies    Intolerances        REVIEW OF SYSTEMS:  CONSTITUTIONAL: No fever, weight loss, or fatigue  EYES: No eye pain, visual disturbances, or discharge  ENMT:  No difficulty hearing, tinnitus, vertigo; No sinus or throat pain  NECK: No pain or stiffness  RESPIRATORY: No cough, wheezing, chills or hemoptysis; No shortness of breath  CARDIOVASCULAR: No chest pain, palpitations, dizziness, or leg swelling  GASTROINTESTINAL: No abdominal or epigastric pain. No nausea, vomiting, or hematemesis; No diarrhea or constipation. No melena or hematochezia.  GENITOURINARY: No dysuria, frequency, hematuria, or incontinence  NEUROLOGICAL: No headaches, memory loss, loss of strength, numbness, or tremors  SKIN: No itching, burning, rashes, or lesions   LYMPH NODES: No enlarged glands  ENDOCRINE: No heat or cold intolerance; No hair loss; No polydipsia or polyuria  MUSCULOSKELETAL: No joint pain or swelling; No muscle, back, or extremity pain  PSYCHIATRIC: No depression, anxiety, mood swings, or difficulty sleeping  HEME/LYMPH: No easy bruising, or bleeding gums  ALLERGY AND IMMUNOLOGIC: No hives or eczema    Vital Signs Last 24 Hrs  T(C): 36.7 (2019 07:39), Max: 36.9 (2019 23:18)  T(F): 98 (2019 07:39), Max: 98.5 (2019 23:18)  HR: 70 (2019 07:39) (64 - 79)  BP: 116/56 (2019 07:39) (96/57 - 123/52)  BP(mean): --  RR: 16 (2019 07:39) (16 - 16)  SpO2: 91% (2019 07:39) (90% - 92%)    PHYSICAL EXAM:  GENERAL: NAD, well-groomed, well-developed  HEAD:  Atraumatic, Normocephalic  EYES: EOMI, PERRLA, conjunctiva and sclera clear  ENMT: No tonsillar erythema, exudates, or enlargement; Moist mucous membranes, Good dentition, No lesions  NECK: Supple, No JVD, Normal thyroid  NERVOUS SYSTEM:  Alert & Oriented X3, Good concentration; Motor Strength 5/5 B/L upper and lower extremities; DTRs 2+ intact and symmetric  CHEST/LUNG: Clear to auscultation bilaterally; No rales, rhonchi, wheezing, or rubs  HEART: Regular rate and rhythm; No murmurs, rubs, or gallops  ABDOMEN: Soft, Nontender, Nondistended; Bowel sounds present  EXTREMITIES:  2+ Peripheral Pulses, No clubbing, cyanosis, or edema  LYMPH: No lymphadenopathy noted  SKIN: sacral decub    LABS:                        8.9    13.21 )-----------( 236      ( 2019 07:17 )             28.5       Ca    7.9        2019 07:02        Urinalysis Basic - ( 15 Toni 2019 14:26 )    Color: Yellow / Appearance: Clear / S.005 / pH: x  Gluc: x / Ketone: Negative  / Bili: Negative / Urobili: Negative   Blood: x / Protein: 25 mg/dL / Nitrite: Negative   Leuk Esterase: Small / RBC: 0-2 /HPF / WBC 6-10   Sq Epi: x / Non Sq Epi: Occasional / Bacteria: Few      CAPILLARY BLOOD GLUCOSE          RADIOLOGY & ADDITIONAL TESTS:    Notes Reviewed:  [x ] YES  [ ] NO    Care Discussed with Consultants/Other Providers [x ] YES  [ ] NO

## 2019-06-18 NOTE — DISCHARGE NOTE PROVIDER - CARE PROVIDER_API CALL
Esau Allen; MBBS)  Internal Medicine  [address] [place] [address]  Phone: [phone]  Fax: [phone]  Follow Up Time:     Kev Lyn)  Infectious Disease; Internal Medicine  [address] [place] [address]  Phone: [phone]  Fax: [phone]  Follow Up Time:     Joe Contreras)  Psychiatry  [address] [place] [address]  Phone: [phone]  Fax: [phone]  Follow Up Time:

## 2019-06-18 NOTE — DISCHARGE NOTE PROVIDER - PROVIDER TOKENS
PROVIDER:[TOKEN:[[id number]]],PROVIDER:[TOKEN:[[id number]]],PROVIDER:[TOKEN:[[id number]]]

## 2019-06-18 NOTE — DISCHARGE NOTE PROVIDER - HOSPITAL COURSE
admitted for UTI    ABX per ID    multiple decubs - doubt infected    DC after ID and WC clearance    LUE swelling - possible gout

## 2019-12-30 ENCOUNTER — INPATIENT (INPATIENT)
Facility: HOSPITAL | Age: 84
LOS: 3 days | Discharge: NURSING, REHAB OR LONG-TERM CARE | DRG: 871 | End: 2020-01-03
Attending: FAMILY MEDICINE | Admitting: FAMILY MEDICINE
Payer: MEDICARE

## 2019-12-30 VITALS
TEMPERATURE: 95 F | RESPIRATION RATE: 16 BRPM | WEIGHT: 139.99 LBS | OXYGEN SATURATION: 93 % | SYSTOLIC BLOOD PRESSURE: 95 MMHG | HEART RATE: 92 BPM | DIASTOLIC BLOOD PRESSURE: 54 MMHG

## 2019-12-30 DIAGNOSIS — N17.9 ACUTE KIDNEY FAILURE, UNSPECIFIED: ICD-10-CM

## 2019-12-30 DIAGNOSIS — K92.2 GASTROINTESTINAL HEMORRHAGE, UNSPECIFIED: ICD-10-CM

## 2019-12-30 DIAGNOSIS — D64.9 ANEMIA, UNSPECIFIED: ICD-10-CM

## 2019-12-30 DIAGNOSIS — D72.829 ELEVATED WHITE BLOOD CELL COUNT, UNSPECIFIED: ICD-10-CM

## 2019-12-30 DIAGNOSIS — R89.9 UNSPECIFIED ABNORMAL FINDING IN SPECIMENS FROM OTHER ORGANS, SYSTEMS AND TISSUES: ICD-10-CM

## 2019-12-30 DIAGNOSIS — Z95.0 PRESENCE OF CARDIAC PACEMAKER: Chronic | ICD-10-CM

## 2019-12-30 LAB
ALBUMIN SERPL ELPH-MCNC: 3.1 G/DL — LOW (ref 3.3–5)
ALP SERPL-CCNC: 67 U/L — SIGNIFICANT CHANGE UP (ref 40–120)
ALT FLD-CCNC: 27 U/L — SIGNIFICANT CHANGE UP (ref 12–78)
AMYLASE P1 CFR SERPL: 81 U/L — SIGNIFICANT CHANGE UP (ref 25–125)
ANION GAP SERPL CALC-SCNC: 10 MMOL/L — SIGNIFICANT CHANGE UP (ref 5–17)
ANION GAP SERPL CALC-SCNC: 16 MMOL/L — SIGNIFICANT CHANGE UP (ref 5–17)
ANISOCYTOSIS BLD QL: SIGNIFICANT CHANGE UP
APPEARANCE UR: CLEAR — SIGNIFICANT CHANGE UP
APTT BLD: 16.6 SEC — LOW (ref 28.5–37)
AST SERPL-CCNC: 46 U/L — HIGH (ref 15–37)
BACTERIA # UR AUTO: ABNORMAL
BASOPHILS # BLD AUTO: 0 K/UL — SIGNIFICANT CHANGE UP (ref 0–0.2)
BASOPHILS NFR BLD AUTO: 0 % — SIGNIFICANT CHANGE UP (ref 0–2)
BILIRUB SERPL-MCNC: 0.2 MG/DL — SIGNIFICANT CHANGE UP (ref 0.2–1.2)
BILIRUB UR-MCNC: NEGATIVE — SIGNIFICANT CHANGE UP
BLD GP AB SCN SERPL QL: SIGNIFICANT CHANGE UP
BUN SERPL-MCNC: 187 MG/DL — HIGH (ref 7–23)
BUN SERPL-MCNC: 200 MG/DL — HIGH (ref 7–23)
CALCIUM SERPL-MCNC: 7.3 MG/DL — LOW (ref 8.5–10.1)
CALCIUM SERPL-MCNC: 9.1 MG/DL — SIGNIFICANT CHANGE UP (ref 8.5–10.1)
CHLORIDE SERPL-SCNC: 118 MMOL/L — HIGH (ref 96–108)
CHLORIDE SERPL-SCNC: 121 MMOL/L — HIGH (ref 96–108)
CO2 SERPL-SCNC: 15 MMOL/L — LOW (ref 22–31)
CO2 SERPL-SCNC: 18 MMOL/L — LOW (ref 22–31)
COLOR SPEC: YELLOW — SIGNIFICANT CHANGE UP
CREAT SERPL-MCNC: 3.7 MG/DL — HIGH (ref 0.5–1.3)
CREAT SERPL-MCNC: 3.7 MG/DL — HIGH (ref 0.5–1.3)
DIFF PNL FLD: NEGATIVE — SIGNIFICANT CHANGE UP
ELLIPTOCYTES BLD QL SMEAR: SLIGHT — SIGNIFICANT CHANGE UP
EOSINOPHIL # BLD AUTO: 0 K/UL — SIGNIFICANT CHANGE UP (ref 0–0.5)
EOSINOPHIL NFR BLD AUTO: 0 % — SIGNIFICANT CHANGE UP (ref 0–6)
EPI CELLS # UR: SIGNIFICANT CHANGE UP
GLUCOSE SERPL-MCNC: 160 MG/DL — HIGH (ref 70–99)
GLUCOSE SERPL-MCNC: 189 MG/DL — HIGH (ref 70–99)
GLUCOSE UR QL: NEGATIVE — SIGNIFICANT CHANGE UP
HCT VFR BLD CALC: 16.2 % — CRITICAL LOW (ref 34.5–45)
HGB BLD-MCNC: 4.9 G/DL — CRITICAL LOW (ref 11.5–15.5)
HYPOCHROMIA BLD QL: SIGNIFICANT CHANGE UP
INR BLD: 1.07 RATIO — SIGNIFICANT CHANGE UP (ref 0.88–1.16)
KETONES UR-MCNC: NEGATIVE — SIGNIFICANT CHANGE UP
LACTATE SERPL-SCNC: 1.7 MMOL/L — SIGNIFICANT CHANGE UP (ref 0.7–2)
LACTATE SERPL-SCNC: 3 MMOL/L — HIGH (ref 0.7–2)
LEUKOCYTE ESTERASE UR-ACNC: ABNORMAL
LIDOCAIN IGE QN: 213 U/L — SIGNIFICANT CHANGE UP (ref 73–393)
LYMPHOCYTES # BLD AUTO: 0.99 K/UL — LOW (ref 1–3.3)
LYMPHOCYTES # BLD AUTO: 4 % — LOW (ref 13–44)
MACROCYTES BLD QL: SLIGHT — SIGNIFICANT CHANGE UP
MANUAL SMEAR VERIFICATION: SIGNIFICANT CHANGE UP
MCHC RBC-ENTMCNC: 29 PG — SIGNIFICANT CHANGE UP (ref 27–34)
MCHC RBC-ENTMCNC: 30.2 GM/DL — LOW (ref 32–36)
MCV RBC AUTO: 95.9 FL — SIGNIFICANT CHANGE UP (ref 80–100)
MICROCYTES BLD QL: SIGNIFICANT CHANGE UP
MONOCYTES # BLD AUTO: 0.75 K/UL — SIGNIFICANT CHANGE UP (ref 0–0.9)
MONOCYTES NFR BLD AUTO: 3 % — SIGNIFICANT CHANGE UP (ref 2–14)
MYELOCYTES NFR BLD: 1 % — HIGH (ref 0–0)
NEUTROPHILS # BLD AUTO: 22.88 K/UL — HIGH (ref 1.8–7.4)
NEUTROPHILS NFR BLD AUTO: 88 % — HIGH (ref 43–77)
NEUTS BAND # BLD: 4 % — SIGNIFICANT CHANGE UP (ref 0–8)
NITRITE UR-MCNC: NEGATIVE — SIGNIFICANT CHANGE UP
NRBC # BLD: 7 — SIGNIFICANT CHANGE UP
NRBC # BLD: SIGNIFICANT CHANGE UP /100 WBCS (ref 0–0)
OB PNL STL: POSITIVE
PH UR: 5 — SIGNIFICANT CHANGE UP (ref 5–8)
PLAT MORPH BLD: NORMAL — SIGNIFICANT CHANGE UP
PLATELET # BLD AUTO: 273 K/UL — SIGNIFICANT CHANGE UP (ref 150–400)
POIKILOCYTOSIS BLD QL AUTO: SLIGHT — SIGNIFICANT CHANGE UP
POLYCHROMASIA BLD QL SMEAR: SLIGHT — SIGNIFICANT CHANGE UP
POTASSIUM SERPL-MCNC: 5.4 MMOL/L — HIGH (ref 3.5–5.3)
POTASSIUM SERPL-MCNC: 6 MMOL/L — HIGH (ref 3.5–5.3)
POTASSIUM SERPL-SCNC: 5.4 MMOL/L — HIGH (ref 3.5–5.3)
POTASSIUM SERPL-SCNC: 6 MMOL/L — HIGH (ref 3.5–5.3)
PROCALCITONIN SERPL-MCNC: 0.61 NG/ML — HIGH (ref 0–0.04)
PROT SERPL-MCNC: 6.3 G/DL — SIGNIFICANT CHANGE UP (ref 6–8.3)
PROT UR-MCNC: NEGATIVE — SIGNIFICANT CHANGE UP
PROTHROM AB SERPL-ACNC: 12.1 SEC — SIGNIFICANT CHANGE UP (ref 10–12.9)
RBC # BLD: 1.69 M/UL — LOW (ref 3.8–5.2)
RBC # FLD: 18.5 % — HIGH (ref 10.3–14.5)
RBC BLD AUTO: ABNORMAL
RBC CASTS # UR COMP ASSIST: ABNORMAL /HPF (ref 0–4)
SODIUM SERPL-SCNC: 149 MMOL/L — HIGH (ref 135–145)
SODIUM SERPL-SCNC: 149 MMOL/L — HIGH (ref 135–145)
SP GR SPEC: 1.01 — SIGNIFICANT CHANGE UP (ref 1.01–1.02)
UROBILINOGEN FLD QL: NEGATIVE — SIGNIFICANT CHANGE UP
WBC # BLD: 24.87 K/UL — HIGH (ref 3.8–10.5)
WBC # FLD AUTO: 24.87 K/UL — HIGH (ref 3.8–10.5)
WBC UR QL: ABNORMAL

## 2019-12-30 PROCEDURE — 71045 X-RAY EXAM CHEST 1 VIEW: CPT | Mod: 26

## 2019-12-30 PROCEDURE — 99285 EMERGENCY DEPT VISIT HI MDM: CPT

## 2019-12-30 PROCEDURE — 70450 CT HEAD/BRAIN W/O DYE: CPT | Mod: 26

## 2019-12-30 PROCEDURE — 74176 CT ABD & PELVIS W/O CONTRAST: CPT | Mod: 26

## 2019-12-30 PROCEDURE — 93010 ELECTROCARDIOGRAM REPORT: CPT

## 2019-12-30 RX ORDER — SODIUM CHLORIDE 9 MG/ML
1000 INJECTION INTRAMUSCULAR; INTRAVENOUS; SUBCUTANEOUS ONCE
Refills: 0 | Status: COMPLETED | OUTPATIENT
Start: 2019-12-30 | End: 2019-12-30

## 2019-12-30 RX ORDER — MORPHINE SULFATE 50 MG/1
2 CAPSULE, EXTENDED RELEASE ORAL EVERY 6 HOURS
Refills: 0 | Status: DISCONTINUED | OUTPATIENT
Start: 2019-12-30 | End: 2020-01-03

## 2019-12-30 RX ORDER — HALOPERIDOL DECANOATE 100 MG/ML
1 INJECTION INTRAMUSCULAR EVERY 6 HOURS
Refills: 0 | Status: DISCONTINUED | OUTPATIENT
Start: 2019-12-30 | End: 2020-01-03

## 2019-12-30 RX ORDER — LOSARTAN POTASSIUM 100 MG/1
1 TABLET, FILM COATED ORAL
Qty: 0 | Refills: 0 | DISCHARGE

## 2019-12-30 RX ORDER — SODIUM POLYSTYRENE SULFONATE 4.1 MEQ/G
15 POWDER, FOR SUSPENSION ORAL ONCE
Refills: 0 | Status: COMPLETED | OUTPATIENT
Start: 2019-12-30 | End: 2019-12-30

## 2019-12-30 RX ORDER — ALBUTEROL 90 UG/1
2.5 AEROSOL, METERED ORAL ONCE
Refills: 0 | Status: COMPLETED | OUTPATIENT
Start: 2019-12-30 | End: 2019-12-30

## 2019-12-30 RX ORDER — MORPHINE SULFATE 50 MG/1
2 CAPSULE, EXTENDED RELEASE ORAL ONCE
Refills: 0 | Status: DISCONTINUED | OUTPATIENT
Start: 2019-12-30 | End: 2019-12-30

## 2019-12-30 RX ORDER — ACETAMINOPHEN 500 MG
650 TABLET ORAL EVERY 6 HOURS
Refills: 0 | Status: DISCONTINUED | OUTPATIENT
Start: 2019-12-30 | End: 2020-01-03

## 2019-12-30 RX ORDER — SODIUM POLYSTYRENE SULFONATE 4.1 MEQ/G
30 POWDER, FOR SUSPENSION ORAL ONCE
Refills: 0 | Status: DISCONTINUED | OUTPATIENT
Start: 2019-12-30 | End: 2019-12-30

## 2019-12-30 RX ORDER — PIPERACILLIN AND TAZOBACTAM 4; .5 G/20ML; G/20ML
3.38 INJECTION, POWDER, LYOPHILIZED, FOR SOLUTION INTRAVENOUS EVERY 12 HOURS
Refills: 0 | Status: DISCONTINUED | OUTPATIENT
Start: 2019-12-30 | End: 2020-01-02

## 2019-12-30 RX ORDER — PANTOPRAZOLE SODIUM 20 MG/1
40 TABLET, DELAYED RELEASE ORAL ONCE
Refills: 0 | Status: COMPLETED | OUTPATIENT
Start: 2019-12-30 | End: 2019-12-30

## 2019-12-30 RX ORDER — SODIUM CHLORIDE 9 MG/ML
1000 INJECTION, SOLUTION INTRAVENOUS
Refills: 0 | Status: DISCONTINUED | OUTPATIENT
Start: 2019-12-30 | End: 2019-12-31

## 2019-12-30 RX ORDER — ASPIRIN/CALCIUM CARB/MAGNESIUM 324 MG
1 TABLET ORAL
Qty: 0 | Refills: 0 | DISCHARGE

## 2019-12-30 RX ORDER — MORPHINE SULFATE 50 MG/1
1 CAPSULE, EXTENDED RELEASE ORAL ONCE
Refills: 0 | Status: DISCONTINUED | OUTPATIENT
Start: 2019-12-30 | End: 2019-12-30

## 2019-12-30 RX ORDER — PIPERACILLIN AND TAZOBACTAM 4; .5 G/20ML; G/20ML
3.38 INJECTION, POWDER, LYOPHILIZED, FOR SOLUTION INTRAVENOUS ONCE
Refills: 0 | Status: COMPLETED | OUTPATIENT
Start: 2019-12-30 | End: 2019-12-30

## 2019-12-30 RX ORDER — SODIUM CHLORIDE 9 MG/ML
1000 INJECTION INTRAMUSCULAR; INTRAVENOUS; SUBCUTANEOUS ONCE
Refills: 0 | Status: DISCONTINUED | OUTPATIENT
Start: 2019-12-30 | End: 2019-12-31

## 2019-12-30 RX ORDER — DESMOPRESSIN ACETATE 0.1 MG/1
19 TABLET ORAL ONCE
Refills: 0 | Status: COMPLETED | OUTPATIENT
Start: 2019-12-30 | End: 2019-12-30

## 2019-12-30 RX ORDER — LIDOCAINE 4 G/100G
1 CREAM TOPICAL DAILY
Refills: 0 | Status: DISCONTINUED | OUTPATIENT
Start: 2019-12-30 | End: 2020-01-03

## 2019-12-30 RX ORDER — SODIUM CHLORIDE 9 MG/ML
1000 INJECTION INTRAMUSCULAR; INTRAVENOUS; SUBCUTANEOUS
Refills: 0 | Status: DISCONTINUED | OUTPATIENT
Start: 2019-12-30 | End: 2019-12-30

## 2019-12-30 RX ORDER — ATORVASTATIN CALCIUM 80 MG/1
1 TABLET, FILM COATED ORAL
Qty: 0 | Refills: 0 | DISCHARGE

## 2019-12-30 RX ORDER — SODIUM CHLORIDE 9 MG/ML
500 INJECTION INTRAMUSCULAR; INTRAVENOUS; SUBCUTANEOUS ONCE
Refills: 0 | Status: COMPLETED | OUTPATIENT
Start: 2019-12-30 | End: 2019-12-30

## 2019-12-30 RX ORDER — PANTOPRAZOLE SODIUM 20 MG/1
1 TABLET, DELAYED RELEASE ORAL
Qty: 0 | Refills: 0 | DISCHARGE

## 2019-12-30 RX ORDER — AMLODIPINE BESYLATE 2.5 MG/1
1 TABLET ORAL
Qty: 0 | Refills: 0 | DISCHARGE

## 2019-12-30 RX ORDER — ALLOPURINOL 300 MG
1 TABLET ORAL
Qty: 0 | Refills: 0 | DISCHARGE

## 2019-12-30 RX ORDER — PANTOPRAZOLE SODIUM 20 MG/1
40 TABLET, DELAYED RELEASE ORAL
Refills: 0 | Status: DISCONTINUED | OUTPATIENT
Start: 2019-12-30 | End: 2020-01-02

## 2019-12-30 RX ADMIN — PANTOPRAZOLE SODIUM 40 MILLIGRAM(S): 20 TABLET, DELAYED RELEASE ORAL at 12:26

## 2019-12-30 RX ADMIN — MORPHINE SULFATE 2 MILLIGRAM(S): 50 CAPSULE, EXTENDED RELEASE ORAL at 13:15

## 2019-12-30 RX ADMIN — SODIUM CHLORIDE 1000 MILLILITER(S): 9 INJECTION INTRAMUSCULAR; INTRAVENOUS; SUBCUTANEOUS at 12:26

## 2019-12-30 RX ADMIN — SODIUM POLYSTYRENE SULFONATE 15 GRAM(S): 4.1 POWDER, FOR SUSPENSION ORAL at 14:45

## 2019-12-30 RX ADMIN — Medication 0.5 MILLIGRAM(S): at 13:45

## 2019-12-30 RX ADMIN — SODIUM CHLORIDE 500 MILLILITER(S): 9 INJECTION INTRAMUSCULAR; INTRAVENOUS; SUBCUTANEOUS at 12:56

## 2019-12-30 RX ADMIN — PIPERACILLIN AND TAZOBACTAM 200 GRAM(S): 4; .5 INJECTION, POWDER, LYOPHILIZED, FOR SOLUTION INTRAVENOUS at 18:11

## 2019-12-30 RX ADMIN — MORPHINE SULFATE 1 MILLIGRAM(S): 50 CAPSULE, EXTENDED RELEASE ORAL at 18:10

## 2019-12-30 RX ADMIN — MORPHINE SULFATE 1 MILLIGRAM(S): 50 CAPSULE, EXTENDED RELEASE ORAL at 18:40

## 2019-12-30 RX ADMIN — ALBUTEROL 2.5 MILLIGRAM(S): 90 AEROSOL, METERED ORAL at 14:45

## 2019-12-30 RX ADMIN — SODIUM CHLORIDE 1000 MILLILITER(S): 9 INJECTION INTRAMUSCULAR; INTRAVENOUS; SUBCUTANEOUS at 18:35

## 2019-12-30 RX ADMIN — MORPHINE SULFATE 2 MILLIGRAM(S): 50 CAPSULE, EXTENDED RELEASE ORAL at 13:45

## 2019-12-30 RX ADMIN — DESMOPRESSIN ACETATE 219 MICROGRAM(S): 0.1 TABLET ORAL at 17:33

## 2019-12-30 NOTE — CONSULT NOTE ADULT - SUBJECTIVE AND OBJECTIVE BOX
Chief Complaint:  Patient is a 89y old  Female who presents with a chief complaint of abnormal labs  (30 Dec 2019 14:09)    Anxiety  GERD (gastroesophageal reflux disease)  Retention of urine  Agitation  Neck pain  Hyperlipidemia, unspecified hyperlipidemia type  Essential hypertension  Chronic back pain  CAD (coronary artery disease)  Cataract  Hypothyroid  DVT (deep venous thrombosis), right  Pacemaker  S/P coronary artery bypass graft x 1     HPI:   · Chief Complaint: The patient is a 89y Female complaining of abnormal lab result.	  · Unable to Obtain: Dementia	  · HPI Objective Statement: HO from pt's son and transfer note.   bibems from [facility] [place] for ARF.  Pt mainly moams during painful stimulation.	    gi consulted for gib. chart reviewed, seen by gi  5/2017 for colitis. pt seen and examined.    recent vs/labs/imaging reviewed-    hgb 8.7 6/2019 and bun/cr 25/1.2  wbc 25k  ob+  hgb 4.9  no coags  k 6 bun cr 200/3.7  no abd imaging  sp 1uprbc no repeat labs        No Known Allergies      acetaminophen  Suppository .. 650 milliGRAM(s) Rectal every 6 hours PRN  ALBUTerol    0.083%. 2.5 milliGRAM(s) Nebulizer once  sodium chloride 0.9% Bolus 1000 milliLiter(s) IV Bolus once  sodium chloride 0.9%. 1000 milliLiter(s) IV Continuous <Continuous>  sodium polystyrene sulfonate Suspension 30 Gram(s) Oral Once        FAMILY HISTORY:        Review of Systems:    General:  No wt loss, fevers, chills, night sweats, fatigue  Eyes:  Good vision, no reported pain  ENT:  No sore throat, pain, runny nose, dysphagia  CV:  No pain, palpitations, no lightheadedness  Resp:  No dyspnea, cough, tachypnea, wheezing  GI: see above  :  No pain, bleeding, incontinence, nocturia  Muscle:  No pain, weakness  Neuro:  No weakness, tingling, memory problems  Psych:  No fatigue, insomnia, mood problems, depression  Endocrine:  No polyuria, polydypsia, cold/heat intolerance  Heme:  No petechiae, ecchymosis, easy bruisability  Skin:  No rash, tattoos, scars, edema    Relevant Family History:   n/c    Relevant Social History: n/c      Physical Exam:    Vital Signs:  Vital Signs Last 24 Hrs  T(C): 36.4 (30 Dec 2019 13:08), Max: 37.1 (30 Dec 2019 10:53)  T(F): 97.5 (30 Dec 2019 13:08), Max: 98.7 (30 Dec 2019 10:53)  HR: 66 (30 Dec 2019 13:08) (66 - 92)  BP: 84/37 (30 Dec 2019 13:08) (84/37 - 95/54)  BP(mean): --  RR: 16 (30 Dec 2019 13:08) (16 - 16)  SpO2: 91% (30 Dec 2019 13:08) (91% - 93%)  Daily     Daily     General:  Appears stated age, well-groomed, nad  HEENT:  NC/AT,  conjunctivae clear and pink, no thyromegaly, nodules, adenopathy, no JVD  Chest:  Full & symmetric excursion, no increased effort, breath sounds clear  Cardiovascular:  Regular rhythm, S1, S2, no murmur/rub/S3/S4, no abdominal bruit, no edema  Abdomen:  Soft, non-tender, non-distended, normoactive bowel sounds,  no masses ,no hepatosplenomeagaly, no signs of chronic liver disease  Extremities:  no cyanosis,clubbing or edema  Skin:  No rash/erythema/ecchymoses/petechiae/wounds/abscess/warm/dry  Neuro/Psych:  A&O  , no asterixis, no tremor, no encephalopathy    Laboratory:                            4.9    24.87 )-----------( 273      ( 30 Dec 2019 11:18 )             16.2     12-30    149<H>  |  118<H>  |  200<H>  ----------------------------<  189<H>  6.0<H>   |  15<L>  |  3.70<H>    Ca    9.1      30 Dec 2019 11:18    TPro  6.3  /  Alb  3.1<L>  /  TBili  0.2  /  DBili  x   /  AST  46<H>  /  ALT  27  /  AlkPhos  67  12-30    LIVER FUNCTIONS - ( 30 Dec 2019 11:18 )  Alb: 3.1 g/dL / Pro: 6.3 g/dL / ALK PHOS: 67 U/L / ALT: 27 U/L / AST: 46 U/L / GGT: x               Amylase Serum81      Lipase [id number]       Ammonia--    Imaging: Chief Complaint:  Patient is a 89y old  Female who presents with a chief complaint of abnormal labs  (30 Dec 2019 14:09)    Anxiety  GERD (gastroesophageal reflux disease)  Retention of urine  Agitation  Neck pain  Hyperlipidemia, unspecified hyperlipidemia type  Essential hypertension  Chronic back pain  CAD (coronary artery disease)  Cataract  Hypothyroid  DVT (deep venous thrombosis), right  Pacemaker  S/P coronary artery bypass graft x 1     HPI:   · Chief Complaint: The patient is a 89y Female complaining of abnormal lab result.	  · Unable to Obtain: Dementia	  · HPI Objective Statement: HO from pt's son and transfer note.   bibems from [facility] [place] for ARF.  Pt mainly moams during painful stimulation.	    gi consulted for gib. chart reviewed, seen by gi  5/2017 for colitis. pt seen and examined. resting in  bed, sp ativan and morphine, lethargic, unable to provide meaingful hx, moaning in bed. info obtained via chart and staff. per rn pt p/w abnormal labs to ed, upon rectal temp, pt noted to have melena; no hematemesis while in ed. prbc infusing    recent vs/labs/imaging reviewed- hypothermic, hypotensive  prior: hgb 8.7 6/2019 and bun/cr 25/1.2 at that time    currently:  wbc 25k  ob+  hgb 4.9  no coags  k 6 bun cr 200/3.7  no abd imaging, old ct reviewed          No Known Allergies      acetaminophen  Suppository .. 650 milliGRAM(s) Rectal every 6 hours PRN  ALBUTerol    0.083%. 2.5 milliGRAM(s) Nebulizer once  sodium chloride 0.9% Bolus 1000 milliLiter(s) IV Bolus once  sodium chloride 0.9%. 1000 milliLiter(s) IV Continuous <Continuous>  sodium polystyrene sulfonate Suspension 30 Gram(s) Oral Once        FAMILY HISTORY:        Review of Systems:  unable to obtain  Relevant Family History:   n/c    Relevant Social History: n/c      Physical Exam:    Vital Signs:  Vital Signs Last 24 Hrs  T(C): 36.4 (30 Dec 2019 13:08), Max: 37.1 (30 Dec 2019 10:53)  T(F): 97.5 (30 Dec 2019 13:08), Max: 98.7 (30 Dec 2019 10:53)  HR: 66 (30 Dec 2019 13:08) (66 - 92)  BP: 84/37 (30 Dec 2019 13:08) (84/37 - 95/54)  BP(mean): --  RR: 16 (30 Dec 2019 13:08) (16 - 16)  SpO2: 91% (30 Dec 2019 13:08) (91% - 93%)  Daily     Daily     General:  lying in bed, pale appearing, frail  HEENT:  NC/AT  Abdomen:  soft appears nt +dt arslan: melanotic stool  Extremities:  no edema  Skin:  areas of ecchymosis   Neuro/Psych:  lethargic, moaning in bed    Laboratory:                            4.9    24.87 )-----------( 273      ( 30 Dec 2019 11:18 )             16.2     12-30    149<H>  |  118<H>  |  200<H>  ----------------------------<  189<H>  6.0<H>   |  15<L>  |  3.70<H>    Ca    9.1      30 Dec 2019 11:18    TPro  6.3  /  Alb  3.1<L>  /  TBili  0.2  /  DBili  x   /  AST  46<H>  /  ALT  27  /  AlkPhos  67  12-30    LIVER FUNCTIONS - ( 30 Dec 2019 11:18 )  Alb: 3.1 g/dL / Pro: 6.3 g/dL / ALK PHOS: 67 U/L / ALT: 27 U/L / AST: 46 U/L / GGT: x               Amylase Serum81      Lipase [id number]       Ammonia--    Imaging:

## 2019-12-30 NOTE — H&P ADULT - NSHPPHYSICALEXAM_GEN_ALL_CORE
· CONSTITUTIONAL: - - -  · Distress: no apparent  · Mentation: ALTERED LOC  · ENMT: Airway patent, Nasal mucosa clear. Mouth with normal mucosa. Throat has no vesicles, no oropharyngeal exudates and uvula is midline.  · EYES: Clear bilaterally, pupils equal, round and reactive to light.  · CARDIAC: Normal rate, regular rhythm.  Heart sounds S1, S2.  No murmurs, rubs or gallops.  · RESPIRATORY: Breath sounds clear and equal bilaterally.  · GASTROINTESTINAL: Abdomen soft, non-tender, no guarding.  · MUSCULOSKELETAL: - - -  · MUSC EXAM: limited  · NEUROLOGICAL: - - -  · NEURO LOC: CONFUSED  · SKIN: Skin normal color for race, warm, dry and intact. No evidence of rash.  · PSYCHIATRIC: Alert and oriented to person, place, time/situation. normal mood and affect. no apparent risk to self or others.

## 2019-12-30 NOTE — CONSULT NOTE ADULT - SUBJECTIVE AND OBJECTIVE BOX
Date/Time Patient Seen:  		  Referring MD:   Data Reviewed	       Patient is a 89y old  Female who presents with a chief complaint of     Subjective/HPI    in bed  seen and examined  vs and meds reviewed  labs reviewed  H and P reviewed  ER provider note reviewed  HISTORY OF PRESENT ILLNESS:    International Travel:  International Travel within 21 days? No(1)     Preferred Language to Address Healthcare:  · Preferred Language to Address Healthcare	English     Child Abuse Assessment (patients less than 13 yrs):  Chief Complaint: abnormal lab result.    · Chief Complaint: The patient is a 89y Female complaining of abnormal lab result.  · Unable to Obtain: Dementia  · HPI Objective Statement: HO from pt's son and transfer note.   bibems from [facility] at [place] for ARF.  Pt mainly moams during painful stimulation.         PAST MEDICAL & SURGICAL HISTORY:  Anxiety  GERD (gastroesophageal reflux disease)  Retention of urine  Agitation  Neck pain  Hyperlipidemia, unspecified hyperlipidemia type  Essential hypertension  Chronic back pain  CAD (coronary artery disease)  Cataract: right eye  Hypothyroid  DVT (deep venous thrombosis), right  Pacemaker  S/P coronary artery bypass graft x 1        Medication list         MEDICATIONS  (STANDING):  sodium chloride 0.9%. 1000 milliLiter(s) (150 mL/Hr) IV Continuous <Continuous>    MEDICATIONS  (PRN):         Vitals log        ICU Vital Signs Last 24 Hrs  T(C): 37.1 (30 Dec 2019 10:53), Max: 37.1 (30 Dec 2019 10:53)  T(F): 98.7 (30 Dec 2019 10:53), Max: 98.7 (30 Dec 2019 10:53)  HR: 92 (30 Dec 2019 10:36) (92 - 92)  BP: 95/54 (30 Dec 2019 10:36) (95/54 - 95/54)  BP(mean): --  ABP: --  ABP(mean): --  RR: 16 (30 Dec 2019 10:36) (16 - 16)  SpO2: 93% (30 Dec 2019 10:36) (93% - 93%)           Input and Output:  I&O's Detail      Lab Data                        4.9    24.87 )-----------( 273      ( 30 Dec 2019 11:18 )             16.2     12-30    149<H>  |  118<H>  |  200<H>  ----------------------------<  189<H>  6.0<H>   |  15<L>  |  3.70<H>    Ca    9.1      30 Dec 2019 11:18    TPro  6.3  /  Alb  3.1<L>  /  TBili  0.2  /  DBili  x   /  AST  46<H>  /  ALT  27  /  AlkPhos  67  12-30            Review of Systems	  confused      Objective     Physical Examination  head at  heart s1s2  lung dec BS  abd soft  frail  weak  agitated        Pertinent Lab findings & Imaging      Kumar:  NO   Adequate UO     I&O's Detail           Discussed with:     Cultures:	        Radiology      EXAM:  CT BRAIN                            PROCEDURE DATE:  12/30/2019          INTERPRETATION:  Altered mental status.    Noncontrast head CT. Prior 9/28/2018.    Age-appropriate parenchymal volume loss mild chronic ischemic cerebral white matter changes similar to prior. No intra-axial or extra-axial hemorrhage edema territorial infarct or mass effect. Clear sinuses. Cranium intact. Bilateral scleral banding. Right cataract surgery    Impression: No acute finding. Stable brain appearance                RANJIT BOOTHE M.D., ATTENDING RADIOLOGIST  This document has been electronically signed. Dec 30 2019 12:19PM          EXAM:  XR CHEST AP OR PA 1V                            PROCEDURE DATE:  12/30/2019          INTERPRETATION:  AP semierect chest on December 30, 2019 at 11:42 AM. Patient has abnormal creatinine level and is scheduled for admission.    Heart is magnified by technique. Sternotomy and left-sided pacemaker again noted.    There is a persistent left base pleural pulmonary reaction. It has increased in size compared to Kristin 15 of this year. Consider CT evaluation.    Chronic apical thickening again noted.    IMPRESSION: Increasing left base process. Consider CT evaluation.                ESTUARDO LARA M.D., ATTENDING RADIOLOGIST  This document has been electronically signed. Dec 30 2019 12:08PM

## 2019-12-30 NOTE — CONSULT NOTE ADULT - PROBLEM SELECTOR RECOMMENDATION 9
anemia - TALON - frail and weak elderly - Dementia - know - delirium -   scheduled for PRBC - IVF - monitor labs - old records reviewed -   spoke with son about GOC and condition and prognosis   reviewed MOLST - updated MOLST and signed - pt is DNR DNI -   supportive medical regimen and admission to medical floor -   there are 3 children - son wishes to cont supportive medical regimen - including transfusions - without aggressive - heroic measures  will follow  may need Seroquel - or Haldol or Risperdal for agitation PRN at some point - redd if her condition interferes with supportive medical regimen and care

## 2019-12-30 NOTE — H&P ADULT - NSHPLABSRESULTS_GEN_ALL_CORE
149<H>  |  118<H>  |  200<H>  ----------------------------<  189<H>  6.0<H>   |  15<L>  |  3.70<H>    Ca    9.1      30 Dec 2019 11:18    TPro  6.3  /  Alb  3.1<L>  /  TBili  0.2  /  DBili  x   /  AST  46<H>  /  ALT  27  /  AlkPhos  67                              4.9    24.87 )-----------( 273      ( 30 Dec 2019 11:18 )             16.2             LIVER FUNCTIONS - ( 30 Dec 2019 11:18 )  Alb: 3.1 g/dL / Pro: 6.3 g/dL / ALK PHOS: 67 U/L / ALT: 27 U/L / AST: 46 U/L / GGT: x             PT/INR - ( 30 Dec 2019 17:36 )   PT: 12.1 sec;   INR: 1.07 ratio         PTT - ( 30 Dec 2019 17:36 )  PTT:16.6 sec    Urinalysis Basic - ( 30 Dec 2019 18:01 )    Color: Yellow / Appearance: Clear / S.015 / pH: x  Gluc: x / Ketone: Negative  / Bili: Negative / Urobili: Negative   Blood: x / Protein: Negative / Nitrite: Negative   Leuk Esterase: Small / RBC: 3-5 /HPF / WBC 11-25   Sq Epi: x / Non Sq Epi: Few / Bacteria: Few

## 2019-12-30 NOTE — H&P ADULT - HISTORY OF PRESENT ILLNESS
presented to ER from LTC after patient was found to have abnormal labs.  Patient is poor historian.  Per family who were at bedside, patient is not eating and drinking well for past few days.  In ER patient was found to be severely anemic and in TALON.  Patient is bieng admitted for further work up and treatment.  Family undecided about GOC.

## 2019-12-30 NOTE — CONSULT NOTE ADULT - ASSESSMENT
fobt+  gib  anemia fobt+  melena  anemia  deb    suspect ugib w component of uremic bleeding  rec ddavp (0.3mcg/kg) for persistent active gib  check coags, check post transfusion cbc  serial h/h, transfuse prn  IVF/on clears  start ppi iv bid  avoid anti plts ac nsaids as able  per noted goc discussions, pt dnr/dni, no aggressive/heroic measures per son  infectious w/u per primary  supportive care, will follow fobt+  melena  anemia  deb    suspect ugib w component of uremic bleeding  rec ddavp (0.3mcg/kg) for persistent active gib; dw pharmacy  check coags, check post transfusion cbc  serial h/h, transfuse prn  IVF/on clears  start ppi iv bid  avoid anti plts ac nsaids as able  per noted goc discussions, pt dnr/dni, no aggressive/heroic measures per son  infectious w/u per primary  supportive care, will follow fobt+  melena  anemia  deb    suspect ugib w component of uremic bleeding  rec ddavp (0.3mcg/kg) for active gib; dw pharmacy  check coags, check post transfusion cbc  serial h/h, transfuse prn  IVF/on clears  start ppi iv bid  avoid anti plts ac nsaids as able  per noted goc discussions, pt dnr/dni, no aggressive/heroic measures per son  infectious w/u per primary  supportive care, will follow

## 2019-12-30 NOTE — CONSULT NOTE ADULT - ASSESSMENT
1.	TALON: Prerenal azotemia, on ARB, R/o Retention  2.	Anemia, GI bleed  3.	Shock (Hypovolemic, ? Sepsis)  4.	Hyperkalemia    IV hydration. D/c ARB. Kumar placement. Will get renal sonogram. Monitor BP trend. Check cultures, IV abx. ID work up in progress.  Monitor h/h trend. Check iron studies if not done recently. Transfuse PRBC's PRN. GI work up. Repeat labs to monitor potassium trend.   Supportive care. Family wishes noted. Will follow electrolytes and renal function trend. Avoid nephrotoxic meds as possible.   D/w nursing staff. Further recommendations pending clinical course. Thank you for the courtesy of this referral.

## 2019-12-30 NOTE — CONSULT NOTE ADULT - SUBJECTIVE AND OBJECTIVE BOX
Patient is a 89y old  Female who presents with a chief complaint of abnormal labs (30 Dec 2019 16:36)    HPI:  89 year old female pmhx of dementia, CAD s/p CABG, pacemaker, HTN, HLD, Chronic back pain, anxiety, GERD, hypothyroidism presenting to ED from [facility] with abnormal labs , noted to have leukocytosis severe anemia and TALON . In ED noted to have hg of 4.6, wbc of [id number] and creatine of 3.7 She was hypothermic temp of 95. HO from pt's son and transfer note.    Renal consult called for TALON and Hyperkalemia. History obtained from chart.     PAST MEDICAL HISTORY:  Anxiety  GERD (gastroesophageal reflux disease)  Retention of urine  Agitation  Neck pain  Hyperlipidemia, unspecified hyperlipidemia type  Essential hypertension  Chronic back pain  CAD (coronary artery disease)  Cataract  Hypothyroid  DVT (deep venous thrombosis), right      PAST SURGICAL HISTORY:  Pacemaker  S/P coronary artery bypass graft x 1      FAMILY HISTORY:      SOCIAL HISTORY: No smoking or alcohol use     Allergies    No Known Allergies    Intolerances      Home Medications:  acetaminophen 325 mg oral tablet: 2 tab(s) orally every 6 hours, As Needed (30 Dec 2019 13:47)  allopurinol 100 mg oral tablet: 1 tab(s) orally once a day (30 Dec 2019 13:47)  amLODIPine 10 mg oral tablet: 1 tab(s) orally once a day (30 Dec 2019 13:47)  ascorbic acid 500 mg oral tablet: 1 tab(s) orally once a day (30 Dec 2019 13:47)  aspirin 81 mg oral tablet, chewable: 1 tab(s) orally once a day (30 Dec 2019 13:47)  atorvastatin 20 mg oral tablet: 1 tab(s) orally once a day (at bedtime) (30 Dec 2019 13:47)  Colace 100 mg oral capsule: 3 cap(s) orally once a day (at bedtime) (30 Dec 2019 13:47)  Feosol 325 mg (65 mg elemental iron) oral tablet: 1 tab(s) orally 2 times a day (30 Dec 2019 13:47)  Teresa-alyssa 8.6 mg oral tablet: 2 tab(s) orally once a day (at bedtime) (30 Dec 2019 13:47)  Levoxyl 137 mcg (0.137 mg) oral tablet: 1 tab(s) orally once a day (30 Dec 2019 13:47)  losartan 50 mg oral tablet: 1 tab(s) orally once a day (30 Dec 2019 13:47)  Multiple Vitamins with Minerals oral tablet: 1 tab(s) orally once a day (30 Dec 2019 13:47)  sodium chloride 0.9% intravenous solution: infuse 50 mls intravenously 3 times a day (30 Dec 2019 13:47)  Zantac 150 oral tablet: 2 tab(s) orally once a day (at bedtime) (30 Dec 2019 13:47)    MEDICATIONS  (STANDING):  desmopressin IVPB 19 MICROGram(s) IV Intermittent once  pantoprazole  Injectable 40 milliGRAM(s) IV Push two times a day  piperacillin/tazobactam IVPB. 3.375 Gram(s) IV Intermittent once  piperacillin/tazobactam IVPB.. 3.375 Gram(s) IV Intermittent every 12 hours  sodium chloride 0.9% Bolus 1000 milliLiter(s) IV Bolus once  sodium chloride 0.9%. 1000 milliLiter(s) (75 mL/Hr) IV Continuous <Continuous>    MEDICATIONS  (PRN):  acetaminophen  Suppository .. 650 milliGRAM(s) Rectal every 6 hours PRN Temp greater or equal to 38C (100.4F), Mild Pain (1 - 3)  haloperidol    Injectable 1 milliGRAM(s) IntraMuscular every 6 hours PRN Agitation      REVIEW OF SYSTEMS:  Unable to obtain    T(F): 97.5 (12-30-19 @ 13:08), Max: 98.7 (12-30-19 @ 10:53)  HR: 66 (12-30-19 @ 13:08) (66 - 92)  BP: 84/37 (12-30-19 @ 13:08) (84/37 - 95/54)  RR: 16 (12-30-19 @ 13:08) (16 - 16)  SpO2: 91% (12-30-19 @ 13:08) (91% - 93%)  Wt(kg): --    PHYSICAL EXAM:  General: No distress  Respiratory: b/l air entry  Cardiovascular: S1 S2  Gastrointestinal: soft, distended  Extremities: no edema      12-30    149<H>  |  118<H>  |  200<H>  ----------------------------<  189<H>  6.0<H>   |  15<L>  |  3.70<H>    Ca    9.1      30 Dec 2019 11:18    T Pro  6.3  /  Alb  3.1<L>  /  T Bili  0.2  /  D Bili  x   /  AST  46<H>  /  ALT  27  /  Alk Phos  67  12-30                          4.9    24.87 )-----------( 273      ( 30 Dec 2019 11:18 )             16.2       Hemoglobin: 4.9 g/dL (12-30 @ 11:18)  Hematocrit: 16.2 % (12-30 @ 11:18)  Blood Urea Nitrogen, Serum: 200 mg/dL (12-30 @ 11:18)  Calcium, Total Serum: 9.1 mg/dL (12-30 @ 11:18)      Creatinine, Serum: 3.70 (12-30 @ 11:18)    LIVER FUNCTIONS - ( 30 Dec 2019 11:18 )  Alb: 3.1 g/dL / Pro: 6.3 g/dL / ALK PHOS: 67 U/L / ALT: 27 U/L / AST: 46 U/L / GGT: x                   < from: Xray Chest 1 View AP/PA (12.30.19 @ 11:41) >    EXAM:  XR CHEST AP OR PA 1V                            PROCEDURE DATE:  12/30/2019          INTERPRETATION:  AP semierect chest on December 30, 2019 at 11:42 AM. Patient has abnormal creatinine level and is scheduled for admission.    Heart is magnified by technique. Sternotomy and left-sided pacemaker again noted.    There is a persistent left base pleural pulmonary reaction. It has increased in size compared to Kristin 15 of this year. Consider CT evaluation.    Chronic apical thickening again noted.    IMPRESSION: Increasing left base process. Consider CT evaluation.        ESTUARDO LARA M.D., ATTENDING RADIOLOGIST  This document has been electronically signed. Dec 30 2019 12:08PM                < end of copied text >

## 2019-12-30 NOTE — CONSULT NOTE ADULT - SUBJECTIVE AND OBJECTIVE BOX
Infectious Diseases Consult by Kev Lyn MD    Reason for Consult : Leukocytosis rule out sepsis     HPI: Unable to obtain any hx as patient is confused   89 year old female pmhx of dementia, CAD s/p CABG, pacemaker, HTN, HLD, Chronic back pain, anxiety, GERD, hypothyroidism presenting to ED from [facility] with abnormal labs , noted to have leukocytosis severe anemia and TALON . In ED noted to have hg of 4.6, [place] of 26[address] and creatine of 3.7 She was hypothermic temp of 95 .    NO SEPSIS work up done in ER       Past Medical & Surgical Hx:  PAST MEDICAL & SURGICAL HISTORY:  Anxiety  GERD (gastroesophageal reflux disease)  Retention of urine  Agitation  Neck pain  Hyperlipidemia, unspecified hyperlipidemia type  Essential hypertension  Chronic back pain  CAD (coronary artery disease)  Cataract: right eye  Hypothyroid  DVT (deep venous thrombosis), right  Pacemaker  S/P coronary artery bypass graft x 1      Social History-- Retired resident of [facility]  EtOH: denies   Tobacco: denies   Drug Use: denies     FAMILY HISTORY:      Allergies    No Known Allergies    Intolerances        Home/ Out patient  Medications :  Home Medications:  acetaminophen 325 mg oral tablet: 2 tab(s) orally every 6 hours, As Needed (30 Dec 2019 13:47)  allopurinol 100 mg oral tablet: 1 tab(s) orally once a day (30 Dec 2019 13:47)  amLODIPine 10 mg oral tablet: 1 tab(s) orally once a day (30 Dec 2019 13:47)  ascorbic acid 500 mg oral tablet: 1 tab(s) orally once a day (30 Dec 2019 13:47)  aspirin 81 mg oral tablet, chewable: 1 tab(s) orally once a day (30 Dec 2019 13:47)  atorvastatin 20 mg oral tablet: 1 tab(s) orally once a day (at bedtime) (30 Dec 2019 13:47)  Colace 100 mg oral capsule: 3 cap(s) orally once a day (at bedtime) (30 Dec 2019 13:47)  Feosol 325 mg (65 mg elemental iron) oral tablet: 1 tab(s) orally 2 times a day (30 Dec 2019 13:47)  Teresa-alyssa 8.6 mg oral tablet: 2 tab(s) orally once a day (at bedtime) (30 Dec 2019 13:47)  Levoxyl 137 mcg (0.137 mg) oral tablet: 1 tab(s) orally once a day (30 Dec 2019 13:47)  losartan 50 mg oral tablet: 1 tab(s) orally once a day (30 Dec 2019 13:47)  Multiple Vitamins with Minerals oral tablet: 1 tab(s) orally once a day (30 Dec 2019 13:47)  sodium chloride 0.9% intravenous solution: infuse 50 mls intravenously 3 times a day (30 Dec 2019 13:47)  Zantac 150 oral tablet: 2 tab(s) orally once a day (at bedtime) (30 Dec 2019 13:47)      Current Inpatient Medications :    ANTIBIOTICS:       OTHER RELEVANT MEDICATIONS :  acetaminophen  Suppository .. 650 milliGRAM(s) Rectal every 6 hours PRN  desmopressin IVPB 19 MICROGram(s) IV Intermittent once  haloperidol    Injectable 1 milliGRAM(s) IntraMuscular every 6 hours PRN  pantoprazole  Injectable 40 milliGRAM(s) IV Push two times a day  sodium chloride 0.9% Bolus 1000 milliLiter(s) IV Bolus once  sodium chloride 0.9%. 1000 milliLiter(s) IV Continuous <Continuous>      ROS:  Unable to obtain due to : confused,       I&O's Detail      Physical Exam:  Vital Signs Last 24 Hrs  T(C): 36.4 (30 Dec 2019 13:08), Max: 37.1 (30 Dec 2019 10:53)  T(F): 97.5 (30 Dec 2019 13:08), Max: 98.7 (30 Dec 2019 10:53)  HR: 66 (30 Dec 2019 13:08) (66 - 92)  BP: 84/37 (30 Dec 2019 13:08) (84/37 - 95/54)  BP(mean): --  RR: 16 (30 Dec 2019 13:08) (16 - 16)  SpO2: 91% (30 Dec 2019 13:08) (91% - 93%)    Weight (kg): 63.5 (12-30 @ 10:36)    General: well developed well nourished, in no acute distress  Eyes: sclera anicteric, pupils equal and reactive to light  ENMT: buccal mucosa moist, pharynx not injected  Neck: supple, trachea midline  Lungs: clear, no wheeze/rhonchi  Cardiovascular: regular rate and rhythm, S1 S2  Abdomen: soft, nontender, no organomegaly present, bowel sounds normal  Neurological:  Lethargic confused  Cranial Nerves II-XII grossly intact  Skin:no increased ecchymosis/petechiae/purpura  Lymph Nodes: no palpable cervical/supraclavicular lymph nodes enlargements  Extremities: no cyanosis/clubbing/edema    Labs:                              4.9    24.87  )----------(  273       ( 30 Dec 2019 11:18 )               16.2     Band Neutrophils %: 4.0 % (12.30.19 @ 11:18)      149    |  118    |  200    ----------------------------<  189        ( 30 Dec 2019 11:18 )  6.0     |  15     |  3.70     Ca    9.1        ( 30 Dec 2019 11:18 )    TPro  6.3    /  Alb  3.1    /  TBili  0.2    /  DBili  x      /  AST  46     /  ALT  27     /  AlkPhos  67     ( 30 Dec 2019 11:18 )    LIVER FUNCTIONS - ( 30 Dec 2019 11:18 )  Alb: 3.1 g/dL / Pro: 6.3 g/dL / ALK PHOS: 67 U/L / ALT: 27 U/L / AST: 46 U/L / GGT: x               CAPILLARY BLOOD GLUCOSE    RECENT CULTURES:    RADIOLOGY & ADDITIONAL STUDIES:  CT Head No Cont (12.30.19 @ 12:09) >  Age-appropriate parenchymal volume loss mild chronic ischemic cerebral white matter changes similar to prior. No intra-axial or extra-axial hemorrhage edema territorial infarct or mass effect. Clear sinuses. Cranium intact. Bilateral scleral banding. Right cataract surgery    Impression: No acute finding. Stable brain appearance    Xray Chest 1 View AP/PA (12.30.19 @ 11:41) >  INTERPRETATION:  AP semierect chest on December 30, 2019 at 11:42 AM. Patient has abnormal creatinine level and is scheduled for admission.    Heart is magnified by technique. Sternotomy and left-sided pacemaker again noted.    There is a persistent left base pleural pulmonary reaction. It has increased in size compared to Kristin 15 of this year. Consider CT evaluation.    Chronic apical thickening again noted.    IMPRESSION: Increasing left base process. Consider CT evaluation.      Assessment :             Plan :       Continue with present regime .  Approptiate use of antibiotics and adverse effects reviewed.      I have discussed the above plan of care with patient/family in detail. They expressed understanding of the treatment plan . Risks, benefits and alternatives discussed in detail. I have asked if they have any questions or concerns and appropriately addressed them to the best of my ability .      > 45 minutes spent in direct patient care reviewing  the notes, lab data/ imaging , discussion with multidisciplinary team. All questions were addressed and answered to the best of my capacity .    Thank you for allowing me to participate in the care of your patient .      Kev Lyn MD  [phone] Infectious Diseases Consult by Kev Lyn MD    Reason for Consult : Leukocytosis rule out sepsis     HPI: Unable to obtain any hx as patient is confused . Son at bedside   89 year old female pmhx of dementia, CAD s/p CABG, pacemaker, HTN, HLD, Chronic back pain, anxiety, GERD, hypothyroidism presenting to ED from [facility] with abnormal labs , noted to have leukocytosis severe anemia and TALON . In ED noted to have hg of 4.6, wbc of 73877 and creatine of 3.7 She was hypothermic temp of 95 .  she has been in  [facility] since June 2019  her abdomen is distended     NO SEPSIS work up done in ER .       Past Medical & Surgical Hx:  PAST MEDICAL & SURGICAL HISTORY:  Anxiety  GERD (gastroesophageal reflux disease)  Retention of urine  Agitation  Neck pain  Hyperlipidemia, unspecified hyperlipidemia type  Essential hypertension  Chronic back pain  CAD (coronary artery disease)  Cataract: right eye  Hypothyroid  DVT (deep venous thrombosis), right  Pacemaker  S/P coronary artery bypass graft x 1      Social History-- Retired resident of [facility]  EtOH: denies   Tobacco: denies   Drug Use: denies     FAMILY HISTORY:      Allergies    No Known Allergies    Intolerances        Home/ Out patient  Medications :  Home Medications:  acetaminophen 325 mg oral tablet: 2 tab(s) orally every 6 hours, As Needed (30 Dec 2019 13:47)  allopurinol 100 mg oral tablet: 1 tab(s) orally once a day (30 Dec 2019 13:47)  amLODIPine 10 mg oral tablet: 1 tab(s) orally once a day (30 Dec 2019 13:47)  ascorbic acid 500 mg oral tablet: 1 tab(s) orally once a day (30 Dec 2019 13:47)  aspirin 81 mg oral tablet, chewable: 1 tab(s) orally once a day (30 Dec 2019 13:47)  atorvastatin 20 mg oral tablet: 1 tab(s) orally once a day (at bedtime) (30 Dec 2019 13:47)  Colace 100 mg oral capsule: 3 cap(s) orally once a day (at bedtime) (30 Dec 2019 13:47)  Feosol 325 mg (65 mg elemental iron) oral tablet: 1 tab(s) orally 2 times a day (30 Dec 2019 13:47)  Teresa-alyssa 8.6 mg oral tablet: 2 tab(s) orally once a day (at bedtime) (30 Dec 2019 13:47)  Levoxyl 137 mcg (0.137 mg) oral tablet: 1 tab(s) orally once a day (30 Dec 2019 13:47)  losartan 50 mg oral tablet: 1 tab(s) orally once a day (30 Dec 2019 13:47)  Multiple Vitamins with Minerals oral tablet: 1 tab(s) orally once a day (30 Dec 2019 13:47)  sodium chloride 0.9% intravenous solution: infuse 50 mls intravenously 3 times a day (30 Dec 2019 13:47)  Zantac 150 oral tablet: 2 tab(s) orally once a day (at bedtime) (30 Dec 2019 13:47)      Current Inpatient Medications :    ANTIBIOTICS:       OTHER RELEVANT MEDICATIONS :  acetaminophen  Suppository .. 650 milliGRAM(s) Rectal every 6 hours PRN  desmopressin IVPB 19 MICROGram(s) IV Intermittent once  haloperidol    Injectable 1 milliGRAM(s) IntraMuscular every 6 hours PRN  pantoprazole  Injectable 40 milliGRAM(s) IV Push two times a day  sodium chloride 0.9% Bolus 1000 milliLiter(s) IV Bolus once  sodium chloride 0.9%. 1000 milliLiter(s) IV Continuous <Continuous>      ROS:  Unable to obtain due to : confused,       I&O's Detail      Physical Exam:  Vital Signs Last 24 Hrs  T(C): 36.4 (30 Dec 2019 13:08), Max: 37.1 (30 Dec 2019 10:53)  T(F): 97.5 (30 Dec 2019 13:08), Max: 98.7 (30 Dec 2019 10:53)  HR: 66 (30 Dec 2019 13:08) (66 - 92)  BP: 84/37 (30 Dec 2019 13:08) (84/37 - 95/54)  BP(mean): --  RR: 16 (30 Dec 2019 13:08) (16 - 16)  SpO2: 91% (30 Dec 2019 13:08) (91% - 93%)    Weight (kg): 63.5 (12-30 @ 10:36)    General: Elderly female  nourished, in no acute distress  Eyes: sclera anicteric, conjunctival pallor  pupils equal and reactive to light  ENMT: buccal mucosa moist, pharynx not injected  Neck: supple, trachea midline  Lungs: clear, no wheeze/rhonchi  Cardiovascular: regular rate and rhythm, S1 S2  Abdomen: soft, Distened, ? tender, no organomegaly present, bowel sounds normal, supra pubic fullness  Neurological:  Lethargic confused    Skin: no increased ecchymosis/petechiae/purpura  Lymph Nodes: no palpable cervical/supraclavicular lymph nodes enlargements  Extremities: no cyanosis/clubbing/has 1 + leg edema    Labs:                          4.9    24.87  )----------(  273       ( 30 Dec 2019 11:18 )               16.2     Band Neutrophils %: 4.0 % (12.30.19 @ 11:18)      149    |  118    |  200    ----------------------------<  189        ( 30 Dec 2019 11:18 )  6.0     |  15     |  3.70     Ca    9.1        ( 30 Dec 2019 11:18 )    TPro  6.3    /  Alb  3.1    /  TBili  0.2    /  DBili  x      /  AST  46     /  ALT  27     /  AlkPhos  67     ( 30 Dec 2019 11:18 )    LIVER FUNCTIONS - ( 30 Dec 2019 11:18 )  Alb: 3.1 g/dL / Pro: 6.3 g/dL / ALK PHOS: 67 U/L / ALT: 27 U/L / AST: 46 U/L / GGT: x           Occult Blood, Feces (12.30.19 @ 11:18)    Occult Blood, Feces: Positive: Method: Peroxidase Catalyzation Activity        CAPILLARY BLOOD GLUCOSE    RECENT CULTURES:    RADIOLOGY & ADDITIONAL STUDIES:  CT Head No Cont (12.30.19 @ 12:09) >  Age-appropriate parenchymal volume loss mild chronic ischemic cerebral white matter changes similar to prior. No intra-axial or extra-axial hemorrhage edema territorial infarct or mass effect. Clear sinuses. Cranium intact. Bilateral scleral banding. Right cataract surgery    Impression: No acute finding. Stable brain appearance    Xray Chest 1 View AP/PA (12.30.19 @ 11:41) >  INTERPRETATION:  AP semierect chest on December 30, 2019 at 11:42 AM. Patient has abnormal creatinine level and is scheduled for admission.    Heart is magnified by technique. Sternotomy and left-sided pacemaker again noted.    There is a persistent left base pleural pulmonary reaction. It has increased in size compared to Kristin 15 of this year. Consider CT evaluation.    Chronic apical thickening again noted.    IMPRESSION: Increasing left base process. Consider CT evaluation.      Assessment :     89 year old female pmhx of dementia, CAD s/p CABG, pacemaker, HTN, HLD, Chronic back pain, anxiety, GERD, hypothyroidism presenting to ED from [facility] with abnormal labs , noted to have leukocytosis severe anemia and TALON. She has guaiac positive stool so may have GI bleeding as cause of anemia . her abdomen is distended need to r/o AUR     She could have sepsis as she has elevated EBC     Plan :   - she needs full sepsis work up with blood cs x 2 , UA and urine cs by straight cath stat, lactate, PCT   - will place on empiric zosyn 3.375 grams q 12 pending cs results   - need to find out her baseline labs as her creatinine in June was normal   - get ct abd and pelvis no contrast stat   - trend cbc   - needs pain control   - she was seen by palliative care and she is DNR/DNI       Continue with present regime .  Appropriate use of antibiotics and adverse effects reviewed.      I have discussed the above plan of care with patient's family in detail. They expressed understanding of the treatment plan . Risks, benefits and alternatives discussed in detail. I have asked if they have any questions or concerns and appropriately addressed them to the best of my ability . Goals of care conversation noted , she is DNR/DNI       > 75  minutes spent in direct patient care reviewing  the notes, lab data/ imaging , discussion with multidisciplinary team. All questions were addressed and answered to the best of my capacity .    Thank you for allowing me to participate in the care of your patient .      Kev Lyn MD  [phone]

## 2019-12-30 NOTE — GOALS OF CARE CONVERSATION - ADVANCED CARE PLANNING - CONVERSATION DETAILS
As per dr shah pt MOLST remains unchanged As per dr shah pt MOLST remains unchanged  Met with son [place] discussed PEG,he does not want a feeding tube. New ELVA completed DNR DNI no FT trial IVF  Family to discuss hospice vs comfort care at nursing home

## 2019-12-30 NOTE — PATIENT PROFILE ADULT - NSPROGENSOURCEINFO_GEN_A_NUR
patient/health record/pt poor historian, no caregiver at bedside at this time  Reviewed H&P and documents sent with pt from [facility] patient/family/health record/pt poor historian, no caregiver at bedside at this time  Reviewed H&P and documents sent with pt from [facility]  This writer called [facility] and spoke with the nursing supervisor who "verified Hx, flu vaccine given to pt 10/15/2019, pt has medium BM 12/30"  This writer called pt's son@ home who verified Hx and assisted with answering the questions due to pt lethargy at this time.  Copy Forward Admy profile from Kristin 15,2019,same reviewed and updated to accurately reflect pt's status at this time.

## 2019-12-31 LAB
ALBUMIN SERPL ELPH-MCNC: 2.7 G/DL — LOW (ref 3.3–5)
ALP SERPL-CCNC: 55 U/L — SIGNIFICANT CHANGE UP (ref 40–120)
ALT FLD-CCNC: 23 U/L — SIGNIFICANT CHANGE UP (ref 12–78)
ANION GAP SERPL CALC-SCNC: 12 MMOL/L — SIGNIFICANT CHANGE UP (ref 5–17)
AST SERPL-CCNC: 39 U/L — HIGH (ref 15–37)
BASOPHILS # BLD AUTO: 0.04 K/UL — SIGNIFICANT CHANGE UP (ref 0–0.2)
BASOPHILS NFR BLD AUTO: 0.2 % — SIGNIFICANT CHANGE UP (ref 0–2)
BILIRUB SERPL-MCNC: 0.6 MG/DL — SIGNIFICANT CHANGE UP (ref 0.2–1.2)
BUN SERPL-MCNC: 193 MG/DL — HIGH (ref 7–23)
CALCIUM SERPL-MCNC: 7.4 MG/DL — LOW (ref 8.5–10.1)
CHLORIDE SERPL-SCNC: 124 MMOL/L — HIGH (ref 96–108)
CO2 SERPL-SCNC: 18 MMOL/L — LOW (ref 22–31)
CREAT SERPL-MCNC: 3.5 MG/DL — HIGH (ref 0.5–1.3)
EOSINOPHIL # BLD AUTO: 0.01 K/UL — SIGNIFICANT CHANGE UP (ref 0–0.5)
EOSINOPHIL NFR BLD AUTO: 0 % — SIGNIFICANT CHANGE UP (ref 0–6)
FERRITIN SERPL-MCNC: 248 NG/ML — HIGH (ref 15–150)
GLUCOSE SERPL-MCNC: 111 MG/DL — HIGH (ref 70–99)
HCT VFR BLD CALC: 21.3 % — LOW (ref 34.5–45)
HGB BLD-MCNC: 7.1 G/DL — LOW (ref 11.5–15.5)
IMM GRANULOCYTES NFR BLD AUTO: 4.9 % — HIGH (ref 0–1.5)
IRON SATN MFR SERPL: 133 UG/DL — SIGNIFICANT CHANGE UP (ref 30–160)
IRON SATN MFR SERPL: 55 % — HIGH (ref 14–50)
LACTATE SERPL-SCNC: 1.2 MMOL/L — SIGNIFICANT CHANGE UP (ref 0.7–2)
LYMPHOCYTES # BLD AUTO: 1.11 K/UL — SIGNIFICANT CHANGE UP (ref 1–3.3)
LYMPHOCYTES # BLD AUTO: 5.4 % — LOW (ref 13–44)
MCHC RBC-ENTMCNC: 30.6 PG — SIGNIFICANT CHANGE UP (ref 27–34)
MCHC RBC-ENTMCNC: 33.3 GM/DL — SIGNIFICANT CHANGE UP (ref 32–36)
MCV RBC AUTO: 91.8 FL — SIGNIFICANT CHANGE UP (ref 80–100)
MONOCYTES # BLD AUTO: 1.49 K/UL — HIGH (ref 0–0.9)
MONOCYTES NFR BLD AUTO: 7.2 % — SIGNIFICANT CHANGE UP (ref 2–14)
NEUTROPHILS # BLD AUTO: 16.97 K/UL — HIGH (ref 1.8–7.4)
NEUTROPHILS NFR BLD AUTO: 82.3 % — HIGH (ref 43–77)
NRBC # BLD: 7 /100 WBCS — HIGH (ref 0–0)
PLATELET # BLD AUTO: 177 K/UL — SIGNIFICANT CHANGE UP (ref 150–400)
POTASSIUM SERPL-MCNC: 4.8 MMOL/L — SIGNIFICANT CHANGE UP (ref 3.5–5.3)
POTASSIUM SERPL-SCNC: 4.8 MMOL/L — SIGNIFICANT CHANGE UP (ref 3.5–5.3)
PROT SERPL-MCNC: 5.1 G/DL — LOW (ref 6–8.3)
RBC # BLD: 2.32 M/UL — LOW (ref 3.8–5.2)
RBC # FLD: 16.2 % — HIGH (ref 10.3–14.5)
SODIUM SERPL-SCNC: 154 MMOL/L — HIGH (ref 135–145)
TIBC SERPL-MCNC: 244 UG/DL — SIGNIFICANT CHANGE UP (ref 220–430)
UIBC SERPL-MCNC: 111 UG/DL — SIGNIFICANT CHANGE UP (ref 110–370)
WBC # BLD: 20.63 K/UL — HIGH (ref 3.8–10.5)
WBC # FLD AUTO: 20.63 K/UL — HIGH (ref 3.8–10.5)

## 2019-12-31 PROCEDURE — 76770 US EXAM ABDO BACK WALL COMP: CPT | Mod: 26

## 2019-12-31 RX ORDER — SODIUM CHLORIDE 9 MG/ML
1000 INJECTION, SOLUTION INTRAVENOUS
Refills: 0 | Status: DISCONTINUED | OUTPATIENT
Start: 2019-12-31 | End: 2020-01-03

## 2019-12-31 RX ADMIN — PANTOPRAZOLE SODIUM 40 MILLIGRAM(S): 20 TABLET, DELAYED RELEASE ORAL at 17:25

## 2019-12-31 RX ADMIN — LIDOCAINE 1 PATCH: 4 CREAM TOPICAL at 14:02

## 2019-12-31 RX ADMIN — PIPERACILLIN AND TAZOBACTAM 25 GRAM(S): 4; .5 INJECTION, POWDER, LYOPHILIZED, FOR SOLUTION INTRAVENOUS at 17:26

## 2019-12-31 RX ADMIN — SODIUM CHLORIDE 80 MILLILITER(S): 9 INJECTION, SOLUTION INTRAVENOUS at 09:42

## 2019-12-31 RX ADMIN — SODIUM CHLORIDE 80 MILLILITER(S): 9 INJECTION, SOLUTION INTRAVENOUS at 23:29

## 2019-12-31 RX ADMIN — LIDOCAINE 1 PATCH: 4 CREAM TOPICAL at 19:39

## 2019-12-31 RX ADMIN — PIPERACILLIN AND TAZOBACTAM 25 GRAM(S): 4; .5 INJECTION, POWDER, LYOPHILIZED, FOR SOLUTION INTRAVENOUS at 06:19

## 2019-12-31 RX ADMIN — SODIUM CHLORIDE 80 MILLILITER(S): 9 INJECTION, SOLUTION INTRAVENOUS at 00:35

## 2019-12-31 RX ADMIN — PANTOPRAZOLE SODIUM 40 MILLIGRAM(S): 20 TABLET, DELAYED RELEASE ORAL at 06:19

## 2019-12-31 NOTE — PROGRESS NOTE ADULT - ASSESSMENT
anemia  renal failure  gi bleed    hgb improved with prbc  suspect uremic bleeding in setting of renal failure  transfuse prn  diet as tolerated  cont proton pump inhibitor  no plan for  upper gastrointestinal endoscopy; i spoke to son waldo husseinr

## 2019-12-31 NOTE — DIETITIAN INITIAL EVALUATION ADULT. - ADD RECOMMEND
Advance diet as medically feasible. RD to provide food preferences as diet advanced. Add Ensure Enlive supplement once diet advanced. Supportive measures. MOLST reviewed - DNI/DNR, no decision regarding tube feeding. Palliative following.

## 2019-12-31 NOTE — DIETITIAN INITIAL EVALUATION ADULT. - PHYSICAL APPEARANCE
other (specify)/well nourished/BMI: 24.4 kg/m2 (using ht of 64 inches from transfer papers). nutrition focused physical exam deferred at this time given pt's status; appear well nourished with mild muscle/subcutaneous fat loss consistent with advanced age and likely not reflective of nutritional status.

## 2019-12-31 NOTE — SWALLOW BEDSIDE ASSESSMENT ADULT - COMMENTS
Pt was awake, cooperative though confusion noted for a clinical assessment of swallow function this PM. Per charting, pt is a 89 y/o female with PMHx significant for dementia, anxiety, urine retention, agitation, hyperlipidemia, HTN, CAD and hypothyroidism who presented from LTC facility with abnormal labs found to be in TALON. Recent CXR revealed " Increasing left base process. Consider CT evaluation."    At the time of today's assessment, pt's son reports decreased PO intake for several days prior to admission. Pt's son denies overt s/s of penetration/aspiration when questioned by SLP.

## 2019-12-31 NOTE — SWALLOW BEDSIDE ASSESSMENT ADULT - ASR SWALLOW ASPIRATION MONITOR
oral hygiene/gurgly voice/cough/change of breathing pattern/position upright (90Y)/fever/pneumonia/throat clearing/upper respiratory infection

## 2019-12-31 NOTE — SWALLOW BEDSIDE ASSESSMENT ADULT - SWALLOW EVAL: RECOMMENDED FEEDING/EATING TECHNIQUES
allow for swallow between intakes/crush medication (when feasible)/small sips/bites/position upright (90 degrees)/alternate food with liquid/maintain upright posture during/after eating for 30 mins/oral hygiene/check mouth frequently for oral residue/pocketing/hard swallow w/ each bite or sip

## 2019-12-31 NOTE — PROGRESS NOTE ADULT - ASSESSMENT
89 female with a TALON, Sepsis and failure to thrive. Hypernatremia is due to decreased PO intake of free water  Continue hydration with IV D5W. Continue ABX as per ID. Will follow.

## 2019-12-31 NOTE — SWALLOW BEDSIDE ASSESSMENT ADULT - ORAL PHASE
Decreased anterior-posterior movement of the bolus/Lingual stasis/transient episodes of oral holding necessitating verbal encouragement from SLP for posterior transport/Delayed oral transit time

## 2019-12-31 NOTE — SWALLOW BEDSIDE ASSESSMENT ADULT - SWALLOW EVAL: DIAGNOSIS
1- pt provided with puree, honey thick liquid, nectar thick and thin liquid textures at which time moderate oral dysphagia was noted marked by delayed bolus collection, transfer and transport with transient evidence of oral holding. pt required verbal encouragement provided by SLP which allowed for posterior transport of boluses in oral cavity. 2- mild pharyngeal dysphagia given puree, honey thick liquid, nectar thick liquid and thin liquids marked by delayed swallow trigger and reduced hyolaryngeal excursion without any overt s/s of penetration/aspiration noted.

## 2019-12-31 NOTE — DIETITIAN INITIAL EVALUATION ADULT. - OTHER INFO
89 year old female with PMH of CAD, HTN, HLD, GERD, hypothyroid admitted for severe anemia, TALON. s/p 1 unit PRBC. Hypernatremia noted, on D5%.     Pt alert, confused. Moaning at this time, with son at bedside. Limited interview conducted given pt's clinical status. Follows regular, thin liquid diet at [facility] facility PTA per son, no added salt dietary restriction (which is consistent with transfer papers). Takes iron, stool softener prior to admission. Typically eats well for her age. Not on a supplement per transfer papers. Per H&P decreased po intake/fluids PTA.     Pt NPO on this admission. Last BM 12/30 per nursing note from plan of care.

## 2020-01-01 DIAGNOSIS — K92.2 GASTROINTESTINAL HEMORRHAGE, UNSPECIFIED: ICD-10-CM

## 2020-01-01 LAB
-  AMIKACIN: SIGNIFICANT CHANGE UP
-  AMPICILLIN/SULBACTAM: SIGNIFICANT CHANGE UP
-  AMPICILLIN: SIGNIFICANT CHANGE UP
-  AZTREONAM: SIGNIFICANT CHANGE UP
-  CEFAZOLIN: SIGNIFICANT CHANGE UP
-  CEFEPIME: SIGNIFICANT CHANGE UP
-  CEFOXITIN: SIGNIFICANT CHANGE UP
-  CEFTRIAXONE: SIGNIFICANT CHANGE UP
-  CIPROFLOXACIN: SIGNIFICANT CHANGE UP
-  GENTAMICIN: SIGNIFICANT CHANGE UP
-  IMIPENEM: SIGNIFICANT CHANGE UP
-  LEVOFLOXACIN: SIGNIFICANT CHANGE UP
-  MEROPENEM: SIGNIFICANT CHANGE UP
-  NITROFURANTOIN: SIGNIFICANT CHANGE UP
-  PIPERACILLIN/TAZOBACTAM: SIGNIFICANT CHANGE UP
-  TIGECYCLINE: SIGNIFICANT CHANGE UP
-  TOBRAMYCIN: SIGNIFICANT CHANGE UP
-  TRIMETHOPRIM/SULFAMETHOXAZOLE: SIGNIFICANT CHANGE UP
ANION GAP SERPL CALC-SCNC: 9 MMOL/L — SIGNIFICANT CHANGE UP (ref 5–17)
ANION GAP SERPL CALC-SCNC: 9 MMOL/L — SIGNIFICANT CHANGE UP (ref 5–17)
BUN SERPL-MCNC: 123 MG/DL — HIGH (ref 7–23)
BUN SERPL-MCNC: 131 MG/DL — HIGH (ref 7–23)
CALCIUM SERPL-MCNC: 7.6 MG/DL — LOW (ref 8.5–10.1)
CALCIUM SERPL-MCNC: 7.7 MG/DL — LOW (ref 8.5–10.1)
CHLORIDE SERPL-SCNC: 119 MMOL/L — HIGH (ref 96–108)
CHLORIDE SERPL-SCNC: 121 MMOL/L — HIGH (ref 96–108)
CO2 SERPL-SCNC: 21 MMOL/L — LOW (ref 22–31)
CO2 SERPL-SCNC: 21 MMOL/L — LOW (ref 22–31)
CREAT SERPL-MCNC: 2.4 MG/DL — HIGH (ref 0.5–1.3)
CREAT SERPL-MCNC: 2.4 MG/DL — HIGH (ref 0.5–1.3)
CULTURE RESULTS: SIGNIFICANT CHANGE UP
GLUCOSE SERPL-MCNC: 142 MG/DL — HIGH (ref 70–99)
GLUCOSE SERPL-MCNC: 155 MG/DL — HIGH (ref 70–99)
HCT VFR BLD CALC: 22 % — LOW (ref 34.5–45)
HCT VFR BLD CALC: 22.5 % — LOW (ref 34.5–45)
HGB BLD-MCNC: 7.1 G/DL — LOW (ref 11.5–15.5)
HGB BLD-MCNC: 7.4 G/DL — LOW (ref 11.5–15.5)
MCHC RBC-ENTMCNC: 30.2 PG — SIGNIFICANT CHANGE UP (ref 27–34)
MCHC RBC-ENTMCNC: 30.8 PG — SIGNIFICANT CHANGE UP (ref 27–34)
MCHC RBC-ENTMCNC: 32.3 GM/DL — SIGNIFICANT CHANGE UP (ref 32–36)
MCHC RBC-ENTMCNC: 32.9 GM/DL — SIGNIFICANT CHANGE UP (ref 32–36)
MCV RBC AUTO: 93.6 FL — SIGNIFICANT CHANGE UP (ref 80–100)
MCV RBC AUTO: 93.8 FL — SIGNIFICANT CHANGE UP (ref 80–100)
METHOD TYPE: SIGNIFICANT CHANGE UP
NRBC # BLD: 3 /100 WBCS — HIGH (ref 0–0)
NRBC # BLD: 3 /100 WBCS — HIGH (ref 0–0)
ORGANISM # SPEC MICROSCOPIC CNT: SIGNIFICANT CHANGE UP
ORGANISM # SPEC MICROSCOPIC CNT: SIGNIFICANT CHANGE UP
PLATELET # BLD AUTO: 178 K/UL — SIGNIFICANT CHANGE UP (ref 150–400)
PLATELET # BLD AUTO: 187 K/UL — SIGNIFICANT CHANGE UP (ref 150–400)
POTASSIUM SERPL-MCNC: 3.6 MMOL/L — SIGNIFICANT CHANGE UP (ref 3.5–5.3)
POTASSIUM SERPL-MCNC: 3.6 MMOL/L — SIGNIFICANT CHANGE UP (ref 3.5–5.3)
POTASSIUM SERPL-SCNC: 3.6 MMOL/L — SIGNIFICANT CHANGE UP (ref 3.5–5.3)
POTASSIUM SERPL-SCNC: 3.6 MMOL/L — SIGNIFICANT CHANGE UP (ref 3.5–5.3)
RBC # BLD: 2.35 M/UL — LOW (ref 3.8–5.2)
RBC # BLD: 2.4 M/UL — LOW (ref 3.8–5.2)
RBC # FLD: 18.3 % — HIGH (ref 10.3–14.5)
RBC # FLD: 18.3 % — HIGH (ref 10.3–14.5)
SODIUM SERPL-SCNC: 149 MMOL/L — HIGH (ref 135–145)
SODIUM SERPL-SCNC: 151 MMOL/L — HIGH (ref 135–145)
SPECIMEN SOURCE: SIGNIFICANT CHANGE UP
WBC # BLD: 11.65 K/UL — HIGH (ref 3.8–10.5)
WBC # BLD: 12.04 K/UL — HIGH (ref 3.8–10.5)
WBC # FLD AUTO: 11.65 K/UL — HIGH (ref 3.8–10.5)
WBC # FLD AUTO: 12.04 K/UL — HIGH (ref 3.8–10.5)

## 2020-01-01 RX ORDER — LACTOBACILLUS ACIDOPHILUS 100MM CELL
1 CAPSULE ORAL THREE TIMES A DAY
Refills: 0 | Status: DISCONTINUED | OUTPATIENT
Start: 2020-01-01 | End: 2020-01-03

## 2020-01-01 RX ADMIN — LIDOCAINE 1 PATCH: 4 CREAM TOPICAL at 15:47

## 2020-01-01 RX ADMIN — PANTOPRAZOLE SODIUM 40 MILLIGRAM(S): 20 TABLET, DELAYED RELEASE ORAL at 17:32

## 2020-01-01 RX ADMIN — LIDOCAINE 1 PATCH: 4 CREAM TOPICAL at 02:02

## 2020-01-01 RX ADMIN — PIPERACILLIN AND TAZOBACTAM 25 GRAM(S): 4; .5 INJECTION, POWDER, LYOPHILIZED, FOR SOLUTION INTRAVENOUS at 05:13

## 2020-01-01 RX ADMIN — PANTOPRAZOLE SODIUM 40 MILLIGRAM(S): 20 TABLET, DELAYED RELEASE ORAL at 05:13

## 2020-01-01 RX ADMIN — PIPERACILLIN AND TAZOBACTAM 25 GRAM(S): 4; .5 INJECTION, POWDER, LYOPHILIZED, FOR SOLUTION INTRAVENOUS at 17:32

## 2020-01-01 RX ADMIN — LIDOCAINE 1 PATCH: 4 CREAM TOPICAL at 17:32

## 2020-01-01 RX ADMIN — Medication 1 TABLET(S): at 15:47

## 2020-01-01 NOTE — PROGRESS NOTE ADULT - ASSESSMENT
1.	TALON: Prerenal azotemia, on ARB, R/o Retention  2.	Anemia, GI bleed  3.	Shock (Hypovolemic, ? Sepsis)  4.	Hyperkalemia    Recheck labs. To continue IV hydration. Off ARB. Renal sonogram results noted. Monitor BP trend. IV abx. ID follow up.   Monitor h/h trend. Transfuse PRBC's PRN. GI work up. Supportive care. Will follow electrolytes and renal function trend.   Avoid nephrotoxic meds as possible. D/w family at bedside. 1.	TALON: Prerenal azotemia, on ARB  2.	Anemia, GI bleed  3.	Shock (Hypovolemic, ? Sepsis)  4.	Hyperkalemia    Recheck labs. To continue IV hydration. Off ARB. Renal sonogram results noted. Monitor BP trend. IV abx. ID follow up.   Potassium better. Monitor h/h trend. Transfuse PRBC's PRN. GI work up. Supportive care. Will follow electrolytes and renal function trend.   Avoid nephrotoxic meds as possible. D/w family at bedside.

## 2020-01-01 NOTE — PROGRESS NOTE ADULT - ASSESSMENT
anemia  renal failure  gi bleed    suspect uremic bleeding in setting of renal failure  no s/s active gib per nursing; labs pending  monitor cbc, transfuse prn  cont proton pump inhibitor  diet as tolerated  no plan for upper gastrointestinal endoscopy; juaquin son waldo  flavio care following

## 2020-01-02 LAB
ALBUMIN SERPL ELPH-MCNC: 2.5 G/DL — LOW (ref 3.3–5)
ALP SERPL-CCNC: 59 U/L — SIGNIFICANT CHANGE UP (ref 40–120)
ALT FLD-CCNC: 23 U/L — SIGNIFICANT CHANGE UP (ref 12–78)
ANION GAP SERPL CALC-SCNC: 8 MMOL/L — SIGNIFICANT CHANGE UP (ref 5–17)
AST SERPL-CCNC: 36 U/L — SIGNIFICANT CHANGE UP (ref 15–37)
BASO STIPL BLD QL SMEAR: PRESENT — SIGNIFICANT CHANGE UP
BASOPHILS # BLD AUTO: 0 K/UL — SIGNIFICANT CHANGE UP (ref 0–0.2)
BASOPHILS NFR BLD AUTO: 0 % — SIGNIFICANT CHANGE UP (ref 0–2)
BILIRUB SERPL-MCNC: 0.6 MG/DL — SIGNIFICANT CHANGE UP (ref 0.2–1.2)
BUN SERPL-MCNC: 93 MG/DL — HIGH (ref 7–23)
CALCIUM SERPL-MCNC: 7.9 MG/DL — LOW (ref 8.5–10.1)
CHLORIDE SERPL-SCNC: 120 MMOL/L — HIGH (ref 96–108)
CO2 SERPL-SCNC: 21 MMOL/L — LOW (ref 22–31)
CREAT SERPL-MCNC: 1.9 MG/DL — HIGH (ref 0.5–1.3)
EOSINOPHIL # BLD AUTO: 0.3 K/UL — SIGNIFICANT CHANGE UP (ref 0–0.5)
EOSINOPHIL NFR BLD AUTO: 3 % — SIGNIFICANT CHANGE UP (ref 0–6)
GLUCOSE SERPL-MCNC: 115 MG/DL — HIGH (ref 70–99)
HCT VFR BLD CALC: 22.6 % — LOW (ref 34.5–45)
HGB BLD-MCNC: 7.3 G/DL — LOW (ref 11.5–15.5)
LYMPHOCYTES # BLD AUTO: 0.69 K/UL — LOW (ref 1–3.3)
LYMPHOCYTES # BLD AUTO: 7 % — LOW (ref 13–44)
MANUAL SMEAR VERIFICATION: SIGNIFICANT CHANGE UP
MCHC RBC-ENTMCNC: 30.4 PG — SIGNIFICANT CHANGE UP (ref 27–34)
MCHC RBC-ENTMCNC: 32.3 GM/DL — SIGNIFICANT CHANGE UP (ref 32–36)
MCV RBC AUTO: 94.2 FL — SIGNIFICANT CHANGE UP (ref 80–100)
METAMYELOCYTES # FLD: 3 % — HIGH (ref 0–0)
MONOCYTES # BLD AUTO: 0.59 K/UL — SIGNIFICANT CHANGE UP (ref 0–0.9)
MONOCYTES NFR BLD AUTO: 6 % — SIGNIFICANT CHANGE UP (ref 2–14)
MYELOCYTES NFR BLD: 2 % — HIGH (ref 0–0)
NEUTROPHILS # BLD AUTO: 7.81 K/UL — HIGH (ref 1.8–7.4)
NEUTROPHILS NFR BLD AUTO: 77 % — SIGNIFICANT CHANGE UP (ref 43–77)
NEUTS BAND # BLD: 2 % — SIGNIFICANT CHANGE UP (ref 0–8)
NRBC # BLD: 4 — SIGNIFICANT CHANGE UP
NRBC # BLD: SIGNIFICANT CHANGE UP /100 WBCS (ref 0–0)
PLAT MORPH BLD: NORMAL — SIGNIFICANT CHANGE UP
PLATELET # BLD AUTO: 189 K/UL — SIGNIFICANT CHANGE UP (ref 150–400)
POTASSIUM SERPL-MCNC: 3.7 MMOL/L — SIGNIFICANT CHANGE UP (ref 3.5–5.3)
POTASSIUM SERPL-SCNC: 3.7 MMOL/L — SIGNIFICANT CHANGE UP (ref 3.5–5.3)
PROT SERPL-MCNC: 5.6 G/DL — LOW (ref 6–8.3)
RBC # BLD: 2.4 M/UL — LOW (ref 3.8–5.2)
RBC # FLD: 18.4 % — HIGH (ref 10.3–14.5)
RBC BLD AUTO: SIGNIFICANT CHANGE UP
SODIUM SERPL-SCNC: 149 MMOL/L — HIGH (ref 135–145)
WBC # BLD: 9.89 K/UL — SIGNIFICANT CHANGE UP (ref 3.8–10.5)
WBC # FLD AUTO: 9.89 K/UL — SIGNIFICANT CHANGE UP (ref 3.8–10.5)

## 2020-01-02 RX ORDER — CEFTRIAXONE 500 MG/1
1000 INJECTION, POWDER, FOR SOLUTION INTRAMUSCULAR; INTRAVENOUS ONCE
Refills: 0 | Status: COMPLETED | OUTPATIENT
Start: 2020-01-02 | End: 2020-01-02

## 2020-01-02 RX ORDER — CEFTRIAXONE 500 MG/1
INJECTION, POWDER, FOR SOLUTION INTRAMUSCULAR; INTRAVENOUS
Refills: 0 | Status: DISCONTINUED | OUTPATIENT
Start: 2020-01-02 | End: 2020-01-03

## 2020-01-02 RX ORDER — QUETIAPINE FUMARATE 200 MG/1
12.5 TABLET, FILM COATED ORAL
Refills: 0 | Status: DISCONTINUED | OUTPATIENT
Start: 2020-01-02 | End: 2020-01-03

## 2020-01-02 RX ORDER — PANTOPRAZOLE SODIUM 20 MG/1
40 TABLET, DELAYED RELEASE ORAL
Refills: 0 | Status: DISCONTINUED | OUTPATIENT
Start: 2020-01-02 | End: 2020-01-03

## 2020-01-02 RX ORDER — CEFTRIAXONE 500 MG/1
1000 INJECTION, POWDER, FOR SOLUTION INTRAMUSCULAR; INTRAVENOUS EVERY 24 HOURS
Refills: 0 | Status: DISCONTINUED | OUTPATIENT
Start: 2020-01-03 | End: 2020-01-03

## 2020-01-02 RX ADMIN — PANTOPRAZOLE SODIUM 40 MILLIGRAM(S): 20 TABLET, DELAYED RELEASE ORAL at 05:33

## 2020-01-02 RX ADMIN — LIDOCAINE 1 PATCH: 4 CREAM TOPICAL at 12:38

## 2020-01-02 RX ADMIN — Medication 1 TABLET(S): at 05:33

## 2020-01-02 RX ADMIN — CEFTRIAXONE 100 MILLIGRAM(S): 500 INJECTION, POWDER, FOR SOLUTION INTRAMUSCULAR; INTRAVENOUS at 17:40

## 2020-01-02 RX ADMIN — LIDOCAINE 1 PATCH: 4 CREAM TOPICAL at 19:33

## 2020-01-02 RX ADMIN — Medication 1 TABLET(S): at 21:26

## 2020-01-02 RX ADMIN — LIDOCAINE 1 PATCH: 4 CREAM TOPICAL at 04:27

## 2020-01-02 RX ADMIN — SODIUM CHLORIDE 80 MILLILITER(S): 9 INJECTION, SOLUTION INTRAVENOUS at 04:27

## 2020-01-02 RX ADMIN — QUETIAPINE FUMARATE 12.5 MILLIGRAM(S): 200 TABLET, FILM COATED ORAL at 21:26

## 2020-01-02 RX ADMIN — SODIUM CHLORIDE 50 MILLILITER(S): 9 INJECTION, SOLUTION INTRAVENOUS at 20:09

## 2020-01-02 RX ADMIN — Medication 1 TABLET(S): at 12:39

## 2020-01-02 RX ADMIN — PIPERACILLIN AND TAZOBACTAM 25 GRAM(S): 4; .5 INJECTION, POWDER, LYOPHILIZED, FOR SOLUTION INTRAVENOUS at 05:32

## 2020-01-02 NOTE — PHYSICAL THERAPY INITIAL EVALUATION ADULT - ADDITIONAL COMMENTS
Patient long term resident at SNF.  Patient is poor historian, as per son, patient is mainly in wheelchair, requires assistance with all ADLs and requires 1 person to transfer at this time.  Son notes she walked in the past and believes she was recently on PT at facility.

## 2020-01-02 NOTE — ADVANCED PRACTICE NURSE CONSULT - ASSESSMENT
RN assessed patient who was found to have stage 2 pressure injury. RN was educated in care of the pt with stage 1 and stage II CAPI.  1.  Apply cavilon daily and critic-aid or pebbles q-shift and PRN.   2.Maintain a moisture free environment.   3.Use supersorb purple pads and  no diapers.  4.Avoid friction and shear.  5.T&P Q2H.   6.Contact WOCN and provider if injury progresses.

## 2020-01-02 NOTE — PHYSICAL THERAPY INITIAL EVALUATION ADULT - BED MOBILITY TRAINING, PT EVAL
Patient will perform all bed mobility with min A x 1 in order to decrease dependence on staff in 1 week

## 2020-01-02 NOTE — PROGRESS NOTE ADULT - ASSESSMENT
anemia  renal failure  gi bleed    suspect uremic bleeding in setting of renal failure  no s/s active gib per nursing; HD stable; labs pending  monitor cbc, transfuse prn  cont proton pump inhibitor  diet as tolerated  no plan for upper gastrointestinal endoscopy; juaquin son waldo  flavio care following, pt dnr/dni

## 2020-01-02 NOTE — PROGRESS NOTE ADULT - ASSESSMENT
1.	TALON: Prerenal azotemia, on ARB  2.	Anemia, GI bleed  3.	Shock (Hypovolemic, ? Sepsis)  4.	Hyperkalemia    Improving renal indices. To continue IV hydration. Off ARB. Monitor BP trend. IV abx. ID follow up.   Potassium better. Monitor h/h trend. Transfuse PRBC's PRN. GI follow up. Supportive care.   Will follow electrolytes and renal function trend. Avoid nephrotoxic meds as possible.

## 2020-01-02 NOTE — PROGRESS NOTE ADULT - PROBLEM SELECTOR PLAN 2
? etiology  IVF and avoid ARB / ACEI  renal eval with Dr. GERARDO appreciated
? etiology, likely dehydration  IVF and avoid ARB / ACEI  renal eval with Dr. GERARDO and etal appreciated
? etiology  IVF and avoid ARB  renal eval with Dr. GERARDO

## 2020-01-02 NOTE — PROGRESS NOTE ADULT - PROBLEM SELECTOR PLAN 3
? etiology  serial CBC and empiric abx per ID  pan culture and ID eval with Dr. ROJO
? etiology, improving  serial CBC and empiric abx per ID  pan culture and ID eval with Dr. ROJO appreciated
? etiology  serial CBC and empiric abx  pan culture and ID eval

## 2020-01-03 ENCOUNTER — TRANSCRIPTION ENCOUNTER (OUTPATIENT)
Age: 85
End: 2020-01-03

## 2020-01-03 VITALS
OXYGEN SATURATION: 94 % | SYSTOLIC BLOOD PRESSURE: 116 MMHG | HEART RATE: 66 BPM | RESPIRATION RATE: 18 BRPM | DIASTOLIC BLOOD PRESSURE: 53 MMHG | TEMPERATURE: 98 F

## 2020-01-03 DIAGNOSIS — Z51.5 ENCOUNTER FOR PALLIATIVE CARE: ICD-10-CM

## 2020-01-03 LAB
ANION GAP SERPL CALC-SCNC: 7 MMOL/L — SIGNIFICANT CHANGE UP (ref 5–17)
BASOPHILS # BLD AUTO: 0.02 K/UL — SIGNIFICANT CHANGE UP (ref 0–0.2)
BASOPHILS NFR BLD AUTO: 0.2 % — SIGNIFICANT CHANGE UP (ref 0–2)
BUN SERPL-MCNC: 68 MG/DL — HIGH (ref 7–23)
CALCIUM SERPL-MCNC: 7.9 MG/DL — LOW (ref 8.5–10.1)
CHLORIDE SERPL-SCNC: 116 MMOL/L — HIGH (ref 96–108)
CO2 SERPL-SCNC: 24 MMOL/L — SIGNIFICANT CHANGE UP (ref 22–31)
CREAT SERPL-MCNC: 1.5 MG/DL — HIGH (ref 0.5–1.3)
EOSINOPHIL # BLD AUTO: 0.26 K/UL — SIGNIFICANT CHANGE UP (ref 0–0.5)
EOSINOPHIL NFR BLD AUTO: 2.6 % — SIGNIFICANT CHANGE UP (ref 0–6)
GLUCOSE SERPL-MCNC: 104 MG/DL — HIGH (ref 70–99)
HCT VFR BLD CALC: 24.3 % — LOW (ref 34.5–45)
HGB BLD-MCNC: 7.5 G/DL — LOW (ref 11.5–15.5)
IMM GRANULOCYTES NFR BLD AUTO: 6.2 % — HIGH (ref 0–1.5)
LYMPHOCYTES # BLD AUTO: 1.02 K/UL — SIGNIFICANT CHANGE UP (ref 1–3.3)
LYMPHOCYTES # BLD AUTO: 10.1 % — LOW (ref 13–44)
MCHC RBC-ENTMCNC: 29.8 PG — SIGNIFICANT CHANGE UP (ref 27–34)
MCHC RBC-ENTMCNC: 30.9 GM/DL — LOW (ref 32–36)
MCV RBC AUTO: 96.4 FL — SIGNIFICANT CHANGE UP (ref 80–100)
MONOCYTES # BLD AUTO: 0.56 K/UL — SIGNIFICANT CHANGE UP (ref 0–0.9)
MONOCYTES NFR BLD AUTO: 5.6 % — SIGNIFICANT CHANGE UP (ref 2–14)
NEUTROPHILS # BLD AUTO: 7.6 K/UL — HIGH (ref 1.8–7.4)
NEUTROPHILS NFR BLD AUTO: 75.3 % — SIGNIFICANT CHANGE UP (ref 43–77)
NRBC # BLD: 0 /100 WBCS — SIGNIFICANT CHANGE UP (ref 0–0)
PLATELET # BLD AUTO: 185 K/UL — SIGNIFICANT CHANGE UP (ref 150–400)
POTASSIUM SERPL-MCNC: 4.3 MMOL/L — SIGNIFICANT CHANGE UP (ref 3.5–5.3)
POTASSIUM SERPL-SCNC: 4.3 MMOL/L — SIGNIFICANT CHANGE UP (ref 3.5–5.3)
RBC # BLD: 2.52 M/UL — LOW (ref 3.8–5.2)
RBC # FLD: 18.7 % — HIGH (ref 10.3–14.5)
SODIUM SERPL-SCNC: 147 MMOL/L — HIGH (ref 135–145)
WBC # BLD: 10.09 K/UL — SIGNIFICANT CHANGE UP (ref 3.8–10.5)
WBC # FLD AUTO: 10.09 K/UL — SIGNIFICANT CHANGE UP (ref 3.8–10.5)

## 2020-01-03 PROCEDURE — 83690 ASSAY OF LIPASE: CPT

## 2020-01-03 PROCEDURE — 87086 URINE CULTURE/COLONY COUNT: CPT

## 2020-01-03 PROCEDURE — 96374 THER/PROPH/DIAG INJ IV PUSH: CPT | Mod: XU

## 2020-01-03 PROCEDURE — 92610 EVALUATE SWALLOWING FUNCTION: CPT

## 2020-01-03 PROCEDURE — 74176 CT ABD & PELVIS W/O CONTRAST: CPT

## 2020-01-03 PROCEDURE — 76770 US EXAM ABDO BACK WALL COMP: CPT

## 2020-01-03 PROCEDURE — 93005 ELECTROCARDIOGRAM TRACING: CPT

## 2020-01-03 PROCEDURE — 81001 URINALYSIS AUTO W/SCOPE: CPT

## 2020-01-03 PROCEDURE — 70450 CT HEAD/BRAIN W/O DYE: CPT

## 2020-01-03 PROCEDURE — 83550 IRON BINDING TEST: CPT

## 2020-01-03 PROCEDURE — 85027 COMPLETE CBC AUTOMATED: CPT

## 2020-01-03 PROCEDURE — 83540 ASSAY OF IRON: CPT

## 2020-01-03 PROCEDURE — 94640 AIRWAY INHALATION TREATMENT: CPT

## 2020-01-03 PROCEDURE — 82272 OCCULT BLD FECES 1-3 TESTS: CPT

## 2020-01-03 PROCEDURE — 97162 PT EVAL MOD COMPLEX 30 MIN: CPT

## 2020-01-03 PROCEDURE — 36430 TRANSFUSION BLD/BLD COMPNT: CPT

## 2020-01-03 PROCEDURE — 86900 BLOOD TYPING SEROLOGIC ABO: CPT

## 2020-01-03 PROCEDURE — 96375 TX/PRO/DX INJ NEW DRUG ADDON: CPT

## 2020-01-03 PROCEDURE — 99285 EMERGENCY DEPT VISIT HI MDM: CPT | Mod: 25

## 2020-01-03 PROCEDURE — P9016: CPT

## 2020-01-03 PROCEDURE — 80048 BASIC METABOLIC PNL TOTAL CA: CPT

## 2020-01-03 PROCEDURE — 86923 COMPATIBILITY TEST ELECTRIC: CPT

## 2020-01-03 PROCEDURE — 82728 ASSAY OF FERRITIN: CPT

## 2020-01-03 PROCEDURE — 71045 X-RAY EXAM CHEST 1 VIEW: CPT

## 2020-01-03 PROCEDURE — 80053 COMPREHEN METABOLIC PANEL: CPT

## 2020-01-03 PROCEDURE — 83605 ASSAY OF LACTIC ACID: CPT

## 2020-01-03 PROCEDURE — 86850 RBC ANTIBODY SCREEN: CPT

## 2020-01-03 PROCEDURE — 85730 THROMBOPLASTIN TIME PARTIAL: CPT

## 2020-01-03 PROCEDURE — 82150 ASSAY OF AMYLASE: CPT

## 2020-01-03 PROCEDURE — 87040 BLOOD CULTURE FOR BACTERIA: CPT

## 2020-01-03 PROCEDURE — 96361 HYDRATE IV INFUSION ADD-ON: CPT

## 2020-01-03 PROCEDURE — 86901 BLOOD TYPING SEROLOGIC RH(D): CPT

## 2020-01-03 PROCEDURE — 85610 PROTHROMBIN TIME: CPT

## 2020-01-03 PROCEDURE — 36415 COLL VENOUS BLD VENIPUNCTURE: CPT

## 2020-01-03 PROCEDURE — 87186 SC STD MICRODIL/AGAR DIL: CPT

## 2020-01-03 PROCEDURE — 84145 PROCALCITONIN (PCT): CPT

## 2020-01-03 RX ORDER — SODIUM CHLORIDE 9 MG/ML
0 INJECTION, SOLUTION INTRAVENOUS
Qty: 0 | Refills: 0 | DISCHARGE

## 2020-01-03 RX ORDER — FERROUS SULFATE 325(65) MG
1 TABLET ORAL
Qty: 0 | Refills: 0 | DISCHARGE

## 2020-01-03 RX ORDER — LACTOBACILLUS ACIDOPHILUS 100MM CELL
0 CAPSULE ORAL
Qty: 0 | Refills: 0 | DISCHARGE
Start: 2020-01-03

## 2020-01-03 RX ORDER — PANTOPRAZOLE SODIUM 20 MG/1
1 TABLET, DELAYED RELEASE ORAL
Qty: 0 | Refills: 0 | DISCHARGE
Start: 2020-01-03

## 2020-01-03 RX ORDER — LOSARTAN POTASSIUM 100 MG/1
1 TABLET, FILM COATED ORAL
Qty: 0 | Refills: 0 | DISCHARGE

## 2020-01-03 RX ORDER — LIDOCAINE 4 G/100G
1 CREAM TOPICAL
Qty: 0 | Refills: 0 | DISCHARGE
Start: 2020-01-03

## 2020-01-03 RX ADMIN — LIDOCAINE 1 PATCH: 4 CREAM TOPICAL at 17:11

## 2020-01-03 RX ADMIN — CEFTRIAXONE 100 MILLIGRAM(S): 500 INJECTION, POWDER, FOR SOLUTION INTRAMUSCULAR; INTRAVENOUS at 15:26

## 2020-01-03 RX ADMIN — Medication 1 TABLET(S): at 13:52

## 2020-01-03 RX ADMIN — LIDOCAINE 1 PATCH: 4 CREAM TOPICAL at 11:49

## 2020-01-03 RX ADMIN — LIDOCAINE 1 PATCH: 4 CREAM TOPICAL at 00:56

## 2020-01-03 NOTE — DISCHARGE NOTE PROVIDER - HOSPITAL COURSE
admitted for severe anemia    PRBC transfusion given     no GI work up per GI MD    TALON - likely from dehydration    ARB taken off    family undecided about hospice / palliative care    Dc after renal and ID clearance    leukocytosis - possible sepsis    ABX per ID

## 2020-01-03 NOTE — DISCHARGE NOTE PROVIDER - NSDCCPCAREPLAN_GEN_ALL_CORE_FT
PRINCIPAL DISCHARGE DIAGNOSIS  Diagnosis: Anemia  Assessment and Plan of Treatment: follow up with PMD Dr. POMPA      SECONDARY DISCHARGE DIAGNOSES  Diagnosis: Anemia  Assessment and Plan of Treatment:

## 2020-01-03 NOTE — PROGRESS NOTE ADULT - ATTENDING COMMENTS
Advanced care planning was discussed with patient and family.  Advanced care planning forms were reviewed and discussed.  Risks, benefits and alternatives of gastroenterologic procedures were discussed in detail and all questions were answered.    30 minutes spent.
comfort / hospice apropriate
hospice / comfort appropriate

## 2020-01-03 NOTE — PROGRESS NOTE ADULT - SUBJECTIVE AND OBJECTIVE BOX
[facility]  David Mckenzie PA-C  [address] [place] [address]  [phone]      INTERVAL HPI/OVERNIGHT EVENTS:    more alert today    MEDICATIONS  (STANDING):  dextrose 5%. 1000 milliLiter(s) (80 mL/Hr) IV Continuous <Continuous>  lidocaine   Patch 1 Patch Transdermal daily  pantoprazole  Injectable 40 milliGRAM(s) IV Push two times a day  piperacillin/tazobactam IVPB.. 3.375 Gram(s) IV Intermittent every 12 hours    MEDICATIONS  (PRN):  acetaminophen  Suppository .. 650 milliGRAM(s) Rectal every 6 hours PRN Temp greater or equal to 38C (100.4F), Mild Pain (1 - 3)  haloperidol    Injectable 1 milliGRAM(s) IntraMuscular every 6 hours PRN Agitation  morphine  - Injectable 2 milliGRAM(s) IV Push every 6 hours PRN Severe Pain (7 - 10)      Allergies    No Known Allergies    Intolerances        ROS:   unable to obtain        PHYSICAL EXAM:   Vital Signs:  Vital Signs Last 24 Hrs  T(C): 36.4 (31 Dec 2019 13:31), Max: 36.6 (31 Dec 2019 06:19)  T(F): 97.6 (31 Dec 2019 13:31), Max: 97.9 (31 Dec 2019 06:19)  HR: 69 (31 Dec 2019 13:31) (69 - 92)  BP: 94/52 (31 Dec 2019 13:31) (85/48 - 95/56)  BP(mean): --  RR: 16 (31 Dec 2019 13:31) (16 - 17)  SpO2: 95% (31 Dec 2019 13:31) (93% - 97%)  Daily     Daily Weight in k.4 (31 Dec 2019 12:02)    nad  confused  frail  non toxic  soft, nt  no edema        LABS:                        7.1    20.63 )-----------( 177      ( 31 Dec 2019 05:01 )             21.3     12-    154<H>  |  124<H>  |  193<H>  ----------------------------<  111<H>  4.8   |  18<L>  |  3.50<H>    Ca    7.4<L>      31 Dec 2019 05:01    TPro  5.1<L>  /  Alb  2.7<L>  /  TBili  0.6  /  DBili  x   /  AST  39<H>  /  ALT  23  /  AlkPhos  55  12-31    PT/INR - ( 30 Dec 2019 17:36 )   PT: 12.1 sec;   INR: 1.07 ratio         PTT - ( 30 Dec 2019 17:36 )  PTT:16.6 sec  Urinalysis Basic - ( 30 Dec 2019 18:01 )    Color: Yellow / Appearance: Clear / S.015 / pH: x  Gluc: x / Ketone: Negative  / Bili: Negative / Urobili: Negative   Blood: x / Protein: Negative / Nitrite: Negative   Leuk Esterase: Small / RBC: 3-5 /HPF / WBC 11-25   Sq Epi: x / Non Sq Epi: Few / Bacteria: Few        RADIOLOGY & ADDITIONAL TESTS:
Date/Time Patient Seen:  		  Referring MD:   Data Reviewed	       Patient is a 89y old  Female who presents with a chief complaint of abnormal labs (01 Jan 2020 17:08)      Subjective/HPI     PAST MEDICAL & SURGICAL HISTORY:  Anxiety  GERD (gastroesophageal reflux disease)  Retention of urine  Agitation  Neck pain  Hyperlipidemia, unspecified hyperlipidemia type  Essential hypertension  Chronic back pain  CAD (coronary artery disease)  Cataract: right eye  Hypothyroid  DVT (deep venous thrombosis), right  Pacemaker  S/P coronary artery bypass graft x 1        Medication list         MEDICATIONS  (STANDING):  dextrose 5%. 1000 milliLiter(s) (80 mL/Hr) IV Continuous <Continuous>  lactobacillus acidophilus 1 Tablet(s) Oral three times a day  lidocaine   Patch 1 Patch Transdermal daily  pantoprazole  Injectable 40 milliGRAM(s) IV Push two times a day  piperacillin/tazobactam IVPB.. 3.375 Gram(s) IV Intermittent every 12 hours    MEDICATIONS  (PRN):  acetaminophen  Suppository .. 650 milliGRAM(s) Rectal every 6 hours PRN Temp greater or equal to 38C (100.4F), Mild Pain (1 - 3)  haloperidol    Injectable 1 milliGRAM(s) IntraMuscular every 6 hours PRN Agitation  morphine  - Injectable 2 milliGRAM(s) IV Push every 6 hours PRN Severe Pain (7 - 10)  QUEtiapine 12.5 milliGRAM(s) Oral two times a day PRN agitation         Vitals log        ICU Vital Signs Last 24 Hrs  T(C): 36.7 (02 Jan 2020 05:16), Max: 36.7 (01 Jan 2020 20:20)  T(F): 98.1 (02 Jan 2020 05:16), Max: 98.1 (02 Jan 2020 05:16)  HR: 63 (02 Jan 2020 05:16) (61 - 65)  BP: 131/69 (02 Jan 2020 05:16) (104/53 - 131/69)  BP(mean): --  ABP: --  ABP(mean): --  RR: 16 (02 Jan 2020 05:16) (16 - 18)  SpO2: 94% (02 Jan 2020 05:16) (93% - 95%)           Input and Output:  I&O's Detail    31 Dec 2019 07:01  -  01 Jan 2020 07:00  --------------------------------------------------------  IN:    dextrose 5%.: 1560 mL    IV PiggyBack: 100 mL    Oral Fluid: 120 mL    sodium chloride 0.45%: 200 mL  Total IN: 1980 mL    OUT:    Indwelling Catheter - Urethral: 1850 mL  Total OUT: 1850 mL    Total NET: 130 mL      01 Jan 2020 07:01  -  02 Jan 2020 06:37  --------------------------------------------------------  IN:    dextrose 5%.: 800 mL  Total IN: 800 mL    OUT:    Indwelling Catheter - Urethral: 1300 mL  Total OUT: 1300 mL    Total NET: -500 mL          Lab Data                        7.1    12.04 )-----------( 178      ( 01 Jan 2020 16:00 )             22.0     01-01    149<H>  |  119<H>  |  123<H>  ----------------------------<  142<H>  3.6   |  21<L>  |  2.40<H>    Ca    7.6<L>      01 Jan 2020 16:00              Review of Systems	      Objective     Physical Examination  head at  head nc  heart s1s2  lung dec BS  abd soft        Pertinent Lab findings & Imaging      José:  NO   Adequate UO     I&O's Detail    31 Dec 2019 07:01  -  01 Jan 2020 07:00  --------------------------------------------------------  IN:    dextrose 5%.: 1560 mL    IV PiggyBack: 100 mL    Oral Fluid: 120 mL    sodium chloride 0.45%: 200 mL  Total IN: 1980 mL    OUT:    Indwelling Catheter - Urethral: 1850 mL  Total OUT: 1850 mL    Total NET: 130 mL      01 Jan 2020 07:01  -  02 Jan 2020 06:37  --------------------------------------------------------  IN:    dextrose 5%.: 800 mL  Total IN: 800 mL    OUT:    Indwelling Catheter - Urethral: 1300 mL  Total OUT: 1300 mL    Total NET: -500 mL               Discussed with:     Cultures:	        Radiology
Date/Time Patient Seen:  		  Referring MD:   Data Reviewed	       Patient is a 89y old  Female who presents with a chief complaint of abnormal labs (02 Jan 2020 15:12)      Subjective/HPI     PAST MEDICAL & SURGICAL HISTORY:  Anxiety  GERD (gastroesophageal reflux disease)  Retention of urine  Agitation  Neck pain  Hyperlipidemia, unspecified hyperlipidemia type  Essential hypertension  Chronic back pain  CAD (coronary artery disease)  Cataract: right eye  Hypothyroid  DVT (deep venous thrombosis), right  Pacemaker  S/P coronary artery bypass graft x 1        Medication list         MEDICATIONS  (STANDING):  cefTRIAXone   IVPB 1000 milliGRAM(s) IV Intermittent every 24 hours  cefTRIAXone   IVPB      dextrose 5%. 1000 milliLiter(s) (50 mL/Hr) IV Continuous <Continuous>  lactobacillus acidophilus 1 Tablet(s) Oral three times a day  lidocaine   Patch 1 Patch Transdermal daily  pantoprazole    Tablet 40 milliGRAM(s) Oral before breakfast    MEDICATIONS  (PRN):  acetaminophen  Suppository .. 650 milliGRAM(s) Rectal every 6 hours PRN Temp greater or equal to 38C (100.4F), Mild Pain (1 - 3)  haloperidol    Injectable 1 milliGRAM(s) IntraMuscular every 6 hours PRN Agitation  morphine  - Injectable 2 milliGRAM(s) IV Push every 6 hours PRN Severe Pain (7 - 10)  QUEtiapine 12.5 milliGRAM(s) Oral two times a day PRN agitation         Vitals log        ICU Vital Signs Last 24 Hrs  T(C): 36.6 (03 Jan 2020 05:49), Max: 37.2 (02 Jan 2020 20:08)  T(F): 97.8 (03 Jan 2020 05:49), Max: 99 (02 Jan 2020 20:08)  HR: 60 (03 Jan 2020 05:49) (60 - 65)  BP: 101/56 (03 Jan 2020 05:49) (101/56 - 113/63)  BP(mean): --  ABP: --  ABP(mean): --  RR: 18 (03 Jan 2020 05:49) (18 - 19)  SpO2: 95% (03 Jan 2020 05:49) (94% - 97%)           Input and Output:  I&O's Detail    02 Jan 2020 07:01  -  03 Jan 2020 07:00  --------------------------------------------------------  IN:    dextrose 5%.: 550 mL  Total IN: 550 mL    OUT:    Indwelling Catheter - Urethral: 1150 mL  Total OUT: 1150 mL    Total NET: -600 mL          Lab Data                        7.3    9.89  )-----------( 189      ( 02 Jan 2020 08:32 )             22.6     01-02    149<H>  |  120<H>  |  93<H>  ----------------------------<  115<H>  3.7   |  21<L>  |  1.90<H>    Ca    7.9<L>      02 Jan 2020 08:32    TPro  5.6<L>  /  Alb  2.5<L>  /  TBili  0.6  /  DBili  x   /  AST  36  /  ALT  23  /  AlkPhos  59  01-02            Review of Systems	      Objective     Physical Examination    head at  heart s1s2  lung dec BS  abd soft  head nc      Pertinent Lab findings & Imaging      José:  NO   Adequate UO     I&O's Detail    02 Jan 2020 07:01  -  03 Jan 2020 07:00  --------------------------------------------------------  IN:    dextrose 5%.: 550 mL  Total IN: 550 mL    OUT:    Indwelling Catheter - Urethral: 1150 mL  Total OUT: 1150 mL    Total NET: -600 mL               Discussed with:     Cultures:	        Radiology
Date/Time Patient Seen:  		  Referring MD:   Data Reviewed	       Patient is a 89y old  Female who presents with a chief complaint of abnormal labs (30 Dec 2019 17:21)      Subjective/HPI     PAST MEDICAL & SURGICAL HISTORY:  Anxiety  GERD (gastroesophageal reflux disease)  Retention of urine  Agitation  Neck pain  Hyperlipidemia, unspecified hyperlipidemia type  Essential hypertension  Chronic back pain  CAD (coronary artery disease)  Cataract: right eye  Hypothyroid  DVT (deep venous thrombosis), right  Pacemaker  S/P coronary artery bypass graft x 1        Medication list         MEDICATIONS  (STANDING):  lidocaine   Patch 1 Patch Transdermal daily  pantoprazole  Injectable 40 milliGRAM(s) IV Push two times a day  piperacillin/tazobactam IVPB.. 3.375 Gram(s) IV Intermittent every 12 hours  sodium chloride 0.45%. 1000 milliLiter(s) (80 mL/Hr) IV Continuous <Continuous>  sodium chloride 0.9% Bolus 1000 milliLiter(s) IV Bolus once    MEDICATIONS  (PRN):  acetaminophen  Suppository .. 650 milliGRAM(s) Rectal every 6 hours PRN Temp greater or equal to 38C (100.4F), Mild Pain (1 - 3)  haloperidol    Injectable 1 milliGRAM(s) IntraMuscular every 6 hours PRN Agitation  morphine  - Injectable 2 milliGRAM(s) IV Push every 6 hours PRN Severe Pain (7 - 10)         Vitals log        ICU Vital Signs Last 24 Hrs  T(C): 36.6 (31 Dec 2019 06:19), Max: 37.1 (30 Dec 2019 10:53)  T(F): 97.9 (31 Dec 2019 06:19), Max: 98.7 (30 Dec 2019 10:53)  HR: 69 (31 Dec 2019 06:19) (62 - 92)  BP: 90/52 (31 Dec 2019 06:19) (84/37 - 104/47)  BP(mean): --  ABP: --  ABP(mean): --  RR: 17 (31 Dec 2019 06:19) (16 - 17)  SpO2: 97% (31 Dec 2019 06:19) (91% - 97%)           Input and Output:  I&O's Detail    30 Dec 2019 07:01  -  31 Dec 2019 06:26  --------------------------------------------------------  IN:    IV PiggyBack: 25 mL  Total IN: 25 mL    OUT:    Indwelling Catheter - Urethral: 150 mL  Total OUT: 150 mL    Total NET: -125 mL          Lab Data                        7.1    20.63 )-----------( 177      ( 31 Dec 2019 05:01 )             21.3     12-31    154<H>  |  124<H>  |  193<H>  ----------------------------<  111<H>  4.8   |  18<L>  |  3.50<H>    Ca    7.4<L>      31 Dec 2019 05:01    TPro  5.1<L>  /  Alb  2.7<L>  /  TBili  0.6  /  DBili  x   /  AST  39<H>  /  ALT  23  /  AlkPhos  55  12-31            Review of Systems	      Objective     Physical Examination    head at  heart s1s2  lung dec BS  abd soft      Pertinent Lab findings & Imaging      José:  NO   Adequate UO     I&O's Detail    30 Dec 2019 07:01  -  31 Dec 2019 06:26  --------------------------------------------------------  IN:    IV PiggyBack: 25 mL  Total IN: 25 mL    OUT:    Indwelling Catheter - Urethral: 150 mL  Total OUT: 150 mL    Total NET: -125 mL               Discussed with:     Cultures:	        Radiology
Date/Time Patient Seen:  		  Referring MD:   Data Reviewed	       Patient is a 89y old  Female who presents with a chief complaint of abnormal labs (31 Dec 2019 18:49)      Subjective/HPI     PAST MEDICAL & SURGICAL HISTORY:  Anxiety  GERD (gastroesophageal reflux disease)  Retention of urine  Agitation  Neck pain  Hyperlipidemia, unspecified hyperlipidemia type  Essential hypertension  Chronic back pain  CAD (coronary artery disease)  Cataract: right eye  Hypothyroid  DVT (deep venous thrombosis), right  Pacemaker  S/P coronary artery bypass graft x 1        Medication list         MEDICATIONS  (STANDING):  dextrose 5%. 1000 milliLiter(s) (80 mL/Hr) IV Continuous <Continuous>  lidocaine   Patch 1 Patch Transdermal daily  pantoprazole  Injectable 40 milliGRAM(s) IV Push two times a day  piperacillin/tazobactam IVPB.. 3.375 Gram(s) IV Intermittent every 12 hours    MEDICATIONS  (PRN):  acetaminophen  Suppository .. 650 milliGRAM(s) Rectal every 6 hours PRN Temp greater or equal to 38C (100.4F), Mild Pain (1 - 3)  haloperidol    Injectable 1 milliGRAM(s) IntraMuscular every 6 hours PRN Agitation  morphine  - Injectable 2 milliGRAM(s) IV Push every 6 hours PRN Severe Pain (7 - 10)         Vitals log        ICU Vital Signs Last 24 Hrs  T(C): 36.8 (01 Jan 2020 05:33), Max: 36.8 (31 Dec 2019 20:27)  T(F): 98.3 (01 Jan 2020 05:33), Max: 98.3 (31 Dec 2019 20:27)  HR: 73 (01 Jan 2020 05:33) (69 - 73)  BP: 114/61 (01 Jan 2020 05:33) (90/52 - 114/61)  BP(mean): --  ABP: --  ABP(mean): --  RR: 18 (01 Jan 2020 05:33) (16 - 18)  SpO2: 96% (01 Jan 2020 05:33) (94% - 97%)           Input and Output:  I&O's Detail    30 Dec 2019 07:01  -  31 Dec 2019 07:00  --------------------------------------------------------  IN:    IV PiggyBack: 25 mL    sodium chloride 0.45%: 400 mL  Total IN: 425 mL    OUT:    Indwelling Catheter - Urethral: 700 mL  Total OUT: 700 mL    Total NET: -275 mL      31 Dec 2019 07:01  -  01 Jan 2020 06:14  --------------------------------------------------------  IN:    dextrose 5%.: 680 mL    IV PiggyBack: 100 mL    Oral Fluid: 120 mL    sodium chloride 0.45%: 200 mL  Total IN: 1100 mL    OUT:    Indwelling Catheter - Urethral: 1400 mL  Total OUT: 1400 mL    Total NET: -300 mL          Lab Data                        7.1    20.63 )-----------( 177      ( 31 Dec 2019 05:01 )             21.3     12-31    154<H>  |  124<H>  |  193<H>  ----------------------------<  111<H>  4.8   |  18<L>  |  3.50<H>    Ca    7.4<L>      31 Dec 2019 05:01    TPro  5.1<L>  /  Alb  2.7<L>  /  TBili  0.6  /  DBili  x   /  AST  39<H>  /  ALT  23  /  AlkPhos  55  12-31            Review of Systems	      Objective     Physical Examination    head at  heart s1s2  lung dec BS      Pertinent Lab findings & Imaging      José:  NO   Adequate UO     I&O's Detail    30 Dec 2019 07:01  -  31 Dec 2019 07:00  --------------------------------------------------------  IN:    IV PiggyBack: 25 mL    sodium chloride 0.45%: 400 mL  Total IN: 425 mL    OUT:    Indwelling Catheter - Urethral: 700 mL  Total OUT: 700 mL    Total NET: -275 mL      31 Dec 2019 07:01  -  01 Jan 2020 06:14  --------------------------------------------------------  IN:    dextrose 5%.: 680 mL    IV PiggyBack: 100 mL    Oral Fluid: 120 mL    sodium chloride 0.45%: 200 mL  Total IN: 1100 mL    OUT:    Indwelling Catheter - Urethral: 1400 mL  Total OUT: 1400 mL    Total NET: -300 mL               Discussed with:     Cultures:	        Radiology
ID Progress note     Name: PETER FREY  Age: 89y  Gender: Female  MRN: [id number]    Interval History-- Events noted, resting comfortably , afebrile , still with black stool , as per GI no plans for EGD   Notes reviewed    Past Medical History--  Anxiety  GERD (gastroesophageal reflux disease)  Retention of urine  Agitation  Neck pain  Hyperlipidemia, unspecified hyperlipidemia type  Essential hypertension  Chronic back pain  CAD (coronary artery disease)  Cataract  Hypothyroid  DVT (deep venous thrombosis), right  Pacemaker  S/P coronary artery bypass graft x 1      For details regarding the patient's social history, family history, and other miscellaneous elements, please refer the initial infectious diseases consultation and/or the admitting history and physical examination for this admission.    Allergies--  Allergies    No Known Allergies    Intolerances        Medications--  Antibiotics:  cefTRIAXone   IVPB 1000 milliGRAM(s) IV Intermittent every 24 hours  cefTRIAXone   IVPB        Immunologic:    Other:  acetaminophen  Suppository .. PRN  dextrose 5%.  haloperidol    Injectable PRN  lactobacillus acidophilus  lidocaine   Patch  morphine  - Injectable PRN  pantoprazole    Tablet  QUEtiapine PRN      Review of Systems--  Review of systems unable to be obtained secondary to clinical condition.    Physical Examination--    Vital Signs: T(F): 97.8 (01-03-20 @ 05:49), Max: 99 (01-02-20 @ 20:08)  HR: 60 (01-03-20 @ 05:49)  BP: 101/56 (01-03-20 @ 05:49)  RR: 18 (01-03-20 @ 05:49)  SpO2: 95% (01-03-20 @ 05:49)  Wt(kg): --  General: Nontoxic-appearing Female in no acute distress.  HEENT: AT/NC. PERRL. EOMI. Anicteric. Conjunctiva pink and moist. Oropharynx clear. Dentition fair.  Neck: Not rigid. No sense of mass.  Nodes: None palpable.  Lungs: Clear bilaterally without rales, wheezing or rhonchi  Heart: Regular rate and rhythm. No Murmur. No rub. No gallop. No palpable thrill.  Abdomen: Bowel sounds present and normoactive. Soft. Nondistended. Nontender. No sense of mass. No organomegaly.  Back: No spinal tenderness. No costovertebral angle tenderness.   Extremities: No cyanosis or clubbing. No edema.   Skin: Warm. Dry. Good turgor. No rash. No vasculitic stigmata.  Psychiatric: Appropriate affect and mood for situation.         Laboratory Studies--  CBC                        7.5    10.09 )-----------( 185      ( 03 Jan 2020 08:53 )             24.3       Chemistries  01-03    147<H>  |  116<H>  |  68<H>  ----------------------------<  104<H>  4.3   |  24  |  1.50<H>    Ca    7.9<L>      03 Jan 2020 08:53    TPro  5.6<L>  /  Alb  2.5<L>  /  TBili  0.6  /  DBili  x   /  AST  36  /  ALT  23  /  AlkPhos  59  01-02      Culture Data    Culture - Urine (collected 30 Dec 2019 22:06)  Source: .Urine Catheterized  Final Report (01 Jan 2020 18:38):    50,000 - 99,000 CFU/mL Escherichia coli  Organism: Escherichia coli (01 Jan 2020 18:38)  Organism: Escherichia coli (01 Jan 2020 18:38)    Culture - Blood (collected 30 Dec 2019 22:03)  Source: .Blood Blood-Peripheral  Preliminary Report (31 Dec 2019 23:02):    No growth to date.    Culture - Blood (collected 30 Dec 2019 22:03)  Source: .Blood Blood-Peripheral  Preliminary Report (31 Dec 2019 23:02):    No growth to date.      Radiology:  CT Abdomen and Pelvis No Cont (12.30.19 @ 19:01) >  FINDINGS:   Lungs: Mild bibasilar atelectasis.   Mediastinum: Small hiatal hernia.     Liver: Normal. No mass.   Gallbladder and bile ducts: Stones at the gallbladder.   Pancreas: Normal. No ductal dilation.   Spleen: Normal. No splenomegaly.   Adrenals:Indeterminate bilateral adrenal nodules, 1.8 cm on the right and 1.4   cm on the left. These are stable presumed benign cortical adenomas  Kidneys and ureters: Indeterminate low attenuation lesions at the kidneys not   fully evaluated on this exam due to lack of IV contrast. These are stable likely represent cysts.  Stomach and bowel: Unremarkable. No obstruction. No mucosal thickening.   Appendix: No evidence of appendicitis.   Intraperitoneal space: Unremarkable. No free air. No significant fluid   collection.   Vasculature: Coronary artery calcifications noted. Aorta demonstrates moderate   atherosclerotic calcification.   Lymph nodes: Unremarkable. No enlarged lymph nodes.     Bladder: Bladder decompressed by a Kumar catheter, but otherwise unremarkable.   Small amount of intraluminal air consistent with instrumentation.   Reproductive: Unremarkable as visualized.   Bones/joints: Previous median sternotomy.   Soft tissues: Unremarkable.     IMPRESSION:   Cholelithiasis.    I agree with the above report by the [facility] radiologist    US Kidney and Bladder (12.31.19 @ 09:11) >  Bilateral renal ultrasound.    The kidney 9.6 the left 9 cm. There is no hydronephrosis shadowing calculus or solid renal mass bilaterally. Several bilateral renal cortical cysts largest on the right kidney measuring up to 3.5 cm. Bladder partially distended with a Kumar in situ. Bladder volume 152-cc.    Impression:    No post renal obstruction. Bilateral renal cysts.      Assessment--    89 year old female pmhx of dementia, CAD s/p CABG, pacemaker, HTN, HLD, Chronic back pain, anxiety, GERD, hypothyroidism presenting to ED from [facility] with abnormal labs , noted to have leukocytosis severe anemia and TALON. She has guaiac positive stool so may have GI bleeding as cause of anemia . her abdomen is distended ,  no evidence of AUR     She could have sepsis as she has elevated WBC . She has TALON without any signs of obstructive uropathy so may have ATN or volume depletion     She remains anemic     Plan:  - will continue with  IV Rocephin 1 gram daily , if no plans for EGD change to po Vantin 200 mg po daily from am x 5 more days   - GI to decide if need for EGD   - trend cbc and BMP  - IV fluids as per nephrology   - she was seen by palliative care and she is DNR/DNI   - PT evaluation     Continue with present regime .  Appropriate use of antibiotics and adverse effects reviewed.    I have discussed the above plan of care with patient's family in detail. They expressed understanding of the treatment plan . Risks, benefits and alternatives discussed in detail. I have asked if they have any questions or concerns and appropriately addressed them to the best of my ability .      > 35 minutes spent in direct patient care reviewing  the notes, lab data/ imaging , discussion with multidisciplinary team. All questions were addressed and answered to the best of my capacity .    Thank you for allowing me to participate in the care of your patient .        Kev Lyn MD  [phone]
ID Progress note     Name: PETER FREY  Age: 89y  Gender: Female  MRN: [id number]    Interval History-- Events noted, doing well, GI follow up noted, no plans for EGD as suspects uremic bleeding secondary to TALON . Still with dumont , renal function improving   Notes reviewed    Past Medical History--  Anxiety  GERD (gastroesophageal reflux disease)  Retention of urine  Agitation  Neck pain  Hyperlipidemia, unspecified hyperlipidemia type  Essential hypertension  Chronic back pain  CAD (coronary artery disease)  Cataract  Hypothyroid  DVT (deep venous thrombosis), right  Pacemaker  S/P coronary artery bypass graft x 1      For details regarding the patient's social history, family history, and other miscellaneous elements, please refer the initial infectious diseases consultation and/or the admitting history and physical examination for this admission.    Allergies--  Allergies    No Known Allergies    Intolerances        Medications--  Antibiotics:  piperacillin/tazobactam IVPB.. 3.375 Gram(s) IV Intermittent every 12 hours    Immunologic:    Other:  acetaminophen  Suppository .. PRN  dextrose 5%.  haloperidol    Injectable PRN  lactobacillus acidophilus  lidocaine   Patch  morphine  - Injectable PRN  pantoprazole    Tablet  QUEtiapine PRN      Review of Systems--  Review of systems unable to be obtained secondary to clinical condition.    Physical Examination--    Vital Signs: T(F): 98.7 (01-02-20 @ 13:02), Max: 98.7 (01-02-20 @ 13:02)  HR: 63 (01-02-20 @ 13:02)  BP: 104/46 (01-02-20 @ 13:02)  RR: 19 (01-02-20 @ 13:02)  SpO2: 97% (01-02-20 @ 13:02)  Wt(kg): --    General: elderly frail -appearing Female in no acute distress.  HEENT: AT/NC. PERRL. EOMI. Anicteric. Conjunctiva pink and moist. Oropharynx clear. Dentition fair.  Neck: Not rigid. No sense of mass.  Nodes: None palpable.  Lungs: Clear bilaterally without rales, wheezing or rhonchi  Heart: Regular rate and rhythm. No Murmur. No rub. No gallop. No palpable thrill.  Abdomen: Bowel sounds present and normoactive. Soft. mild distended. Nontender. No sense of mass. No organomegaly.  Back: No spinal tenderness. No costovertebral angle tenderness.   Extremities: No cyanosis or clubbing. No edema.   Skin: Warm. Dry. Good turgor. No rash. No vasculitic stigmata.  Psychiatric: Appropriate affect and mood for situation.   Laboratory Studies--  CBC                        7.3    9.89  )-----------( 189      ( 02 Jan 2020 08:32 )             22.6       Chemistries  01-02    149<H>  |  120<H>  |  93<H>  ----------------------------<  115<H>  3.7   |  21<L>  |  1.90<H>    Ca    7.9<L>      02 Jan 2020 08:32    TPro  5.6<L>  /  Alb  2.5<L>  /  TBili  0.6  /  DBili  x   /  AST  36  /  ALT  23  /  AlkPhos  59  01-02      Culture Data    Culture - Urine (12.30.19 @ 22:06)    -  Nitrofurantoin: S <=32 Should not be used to treat pyelonephritis    -  Tigecycline: S <=2    -  Piperacillin/Tazobactam: S <=16    -  Trimethoprim/Sulfamethoxazole: S <=2/38    -  Tobramycin: S <=4    -  Ciprofloxacin: S <=1    -  Ceftriaxone: S <=1 Enterobacter, Citrobacter, and Serratia may develop resistance during prolonged therapy    -  Cefoxitin: S <=8    -  Levofloxacin: S <=2    -  Meropenem: S <=1    -  Imipenem: S <=1    -  Gentamicin: S <=4    -  Ampicillin: S <=8 These ampicillin results predict results for amoxicillin    -  Ampicillin/Sulbactam: S <=8/4 Enterobacter, Citrobacter, and Serratia may develop resistance during prolonged therapy (3-4 days)    -  Cefepime: S <=4    -  Cefazolin: S <=8 (MIC_CL_COM_ENTERIC_[id number]) For uncomplicated UTI with K. pneumoniae, E. coli, or P. mirablis: JIE <=16 is sensitive and JIE >=32 is resistant. This also predicts results for oral agents cefaclor, cefdinir, cefpodoxime, cefprozil, cefuroxime axetil, cephalexin and locarbef for uncomplicated UTI. Note that some isolates may be susceptible to these agents while testing resistant to cefazolin.    -  Aztreonam: S <=4    -  Amikacin: S <=16    Specimen Source: .Urine Catheterized    Culture Results:   50,000 - 99,000 CFU/mL Escherichia coli    Organism Identification: Escherichia coli    Organism: Escherichia coli    Method Type: JIE      Culture - Blood (collected 30 Dec 2019 22:03)  Source: .Blood Blood-Peripheral  Preliminary Report (31 Dec 2019 23:02):    No growth to date.    Culture - Blood (collected 30 Dec 2019 22:03)  Source: .Blood Blood-Peripheral  Preliminary Report (31 Dec 2019 23:02):    No growth to date.        Radiology:  CT Abdomen and Pelvis No Cont (12.30.19 @ 19:01) >  FINDINGS:   Lungs: Mild bibasilar atelectasis.   Mediastinum: Small hiatal hernia.     Liver: Normal. No mass.   Gallbladder and bile ducts: Stones at the gallbladder.   Pancreas: Normal. No ductal dilation.   Spleen: Normal. No splenomegaly.   Adrenals:Indeterminate bilateral adrenal nodules, 1.8 cm on the right and 1.4   cm on the left. These are stable presumed benign cortical adenomas  Kidneys and ureters: Indeterminate low attenuation lesions at the kidneys not   fully evaluated on this exam due to lack of IV contrast. These are stable likely represent cysts.  Stomach and bowel: Unremarkable. No obstruction. No mucosal thickening.   Appendix: No evidence of appendicitis.   Intraperitoneal space: Unremarkable. No free air. No significant fluid   collection.   Vasculature: Coronary artery calcifications noted. Aorta demonstrates moderate   atherosclerotic calcification.   Lymph nodes: Unremarkable. No enlarged lymph nodes.     Bladder: Bladder decompressed by a Dumont catheter, but otherwise unremarkable.   Small amount of intraluminal air consistent with instrumentation.   Reproductive: Unremarkable as visualized.   Bones/joints: Previous median sternotomy.   Soft tissues: Unremarkable.     IMPRESSION:   Cholelithiasis.    I agree with the above report by the [facility] radiologist    US Kidney and Bladder (12.31.19 @ 09:11) >  Bilateral renal ultrasound.    The kidney 9.6 the left 9 cm. There is no hydronephrosis shadowing calculus or solid renal mass bilaterally. Several bilateral renal cortical cysts largest on the right kidney measuring up to 3.5 cm. Bladder partially distended with a Dumont in situ. Bladder volume 152-cc.    Impression:    No post renal obstruction. Bilateral renal cysts.      Assessment--    89 year old female pmhx of dementia, CAD s/p CABG, pacemaker, HTN, HLD, Chronic back pain, anxiety, GERD, hypothyroidism presenting to ED from [facility] with abnormal labs , noted to have leukocytosis severe anemia and TALON. She has guaiac positive stool so may have GI bleeding as cause of anemia . her abdomen is distended ,  no evidence of AUR     She could have sepsis as she has elevated WBC . She has TALON without any signs of obstructive uropathy so may have ATN or volume depletion     She remains anemic     Plan:  - will change to IV Rocephin 1 gram daily , if no plans for EGD change to po Vantin   - GI to decide if need for EGD   - trend cbc and BMP  - IV fluids as per nephrology   - she was seen by palliative care and she is DNR/DNI   - PT evaluation     Continue with present regime .  Appropriate use of antibiotics and adverse effects reviewed.    I have discussed the above plan of care with patient 's family in detail. They expressed understanding of the treatment plan . Risks, benefits and alternatives discussed in detail. I have asked if they have any questions or concerns and appropriately addressed them to the best of my ability .      > 35 minutes spent in direct patient care reviewing  the notes, lab data/ imaging , discussion with multidisciplinary team. All questions were addressed and answered to the best of my capacity .    Thank you for allowing me to participate in the care of your patient .        Kev Lyn MD  [phone]
ID Progress note     Name: PETER FREY  Age: 89y  Gender: Female  MRN: [id number]    Interval History-- Events noted, much more awake and responsive, son at bedside , doing well, Improving renal function   Notes reviewed    Past Medical History--  Anxiety  GERD (gastroesophageal reflux disease)  Retention of urine  Agitation  Neck pain  Hyperlipidemia, unspecified hyperlipidemia type  Essential hypertension  Chronic back pain  CAD (coronary artery disease)  Cataract  Hypothyroid  DVT (deep venous thrombosis), right  Pacemaker  S/P coronary artery bypass graft x 1      For details regarding the patient's social history, family history, and other miscellaneous elements, please refer the initial infectious diseases consultation and/or the admitting history and physical examination for this admission.    Allergies--  Allergies    No Known Allergies    Intolerances        Medications--  Antibiotics:  piperacillin/tazobactam IVPB.. 3.375 Gram(s) IV Intermittent every 12 hours    Immunologic:    Other:  acetaminophen  Suppository .. PRN  dextrose 5%.  haloperidol    Injectable PRN  lactobacillus acidophilus  lidocaine   Patch  morphine  - Injectable PRN  pantoprazole  Injectable      Review of Systems--  Review of systems unable to be obtained secondary to clinical condition.    Physical Examination--    Vital Signs: T(F): 97.7 (01-01-20 @ 13:52), Max: 98.3 (12-31-19 @ 20:27)  HR: 61 (01-01-20 @ 13:52)  BP: 104/53 (01-01-20 @ 13:52)  RR: 18 (01-01-20 @ 13:52)  SpO2: 95% (01-01-20 @ 13:52)  Wt(kg): --  General: elderly frail -appearing Female in no acute distress.  HEENT: AT/NC. PERRL. EOMI. Anicteric. Conjunctiva pink and moist. Oropharynx clear. Dentition fair.  Neck: Not rigid. No sense of mass.  Nodes: None palpable.  Lungs: Clear bilaterally without rales, wheezing or rhonchi  Heart: Regular rate and rhythm. No Murmur. No rub. No gallop. No palpable thrill.  Abdomen: Bowel sounds present and normoactive. Soft. mild distended. Nontender. No sense of mass. No organomegaly.  Back: No spinal tenderness. No costovertebral angle tenderness.   Extremities: No cyanosis or clubbing. No edema.   Skin: Warm. Dry. Good turgor. No rash. No vasculitic stigmata.  Psychiatric: Appropriate affect and mood for situation.       Laboratory Studies--  CBC                        7.1    12.04 )-----------( 178      ( 01 Jan 2020 16:00 )             22.0       Chemistries  01-01    149<H>  |  119<H>  |  123<H>  ----------------------------<  142<H>  3.6   |  21<L>  |  2.40<H>    Ca    7.6<L>      01 Jan 2020 16:00    TPro  5.1<L>  /  Alb  2.7<L>  /  TBili  0.6  /  DBili  x   /  AST  39<H>  /  ALT  23  /  AlkPhos  55  12-31      Culture Data    Culture - Urine (collected 30 Dec 2019 22:06)  Source: .Urine Catheterized  Preliminary Report (31 Dec 2019 17:52):    50,000 - 99,000 CFU/mL Gram Negative Rods    Culture - Blood (collected 30 Dec 2019 22:03)  Source: .Blood Blood-Peripheral  Preliminary Report (31 Dec 2019 23:02):    No growth to date.    Culture - Blood (collected 30 Dec 2019 22:03)  Source: .Blood Blood-Peripheral  Preliminary Report (31 Dec 2019 23:02):    No growth to date.    Radiology:  CT Abdomen and Pelvis No Cont (12.30.19 @ 19:01) >  FINDINGS:   Lungs: Mild bibasilar atelectasis.   Mediastinum: Small hiatal hernia.     Liver: Normal. No mass.   Gallbladder and bile ducts: Stones at the gallbladder.   Pancreas: Normal. No ductal dilation.   Spleen: Normal. No splenomegaly.   Adrenals:Indeterminate bilateral adrenal nodules, 1.8 cm on the right and 1.4   cm on the left. These are stable presumed benign cortical adenomas  Kidneys and ureters: Indeterminate low attenuation lesions at the kidneys not   fully evaluated on this exam due to lack of IV contrast. These are stable likely represent cysts.  Stomach and bowel: Unremarkable. No obstruction. No mucosal thickening.   Appendix: No evidence of appendicitis.   Intraperitoneal space: Unremarkable. No free air. No significant fluid   collection.   Vasculature: Coronary artery calcifications noted. Aorta demonstrates moderate   atherosclerotic calcification.   Lymph nodes: Unremarkable. No enlarged lymph nodes.     Bladder: Bladder decompressed by a Kumar catheter, but otherwise unremarkable.   Small amount of intraluminal air consistent with instrumentation.   Reproductive: Unremarkable as visualized.   Bones/joints: Previous median sternotomy.   Soft tissues: Unremarkable.     IMPRESSION:   Cholelithiasis.    I agree with the above report by the [facility] radiologist    US Kidney and Bladder (12.31.19 @ 09:11) >  Bilateral renal ultrasound.    The kidney 9.6 the left 9 cm. There is no hydronephrosis shadowing calculus or solid renal mass bilaterally. Several bilateral renal cortical cysts largest on the right kidney measuring up to 3.5 cm. Bladder partially distended with a Kumar in situ. Bladder volume 152-cc.    Impression:    No post renal obstruction. Bilateral renal cysts.      Assessment--    89 year old female pmhx of dementia, CAD s/p CABG, pacemaker, HTN, HLD, Chronic back pain, anxiety, GERD, hypothyroidism presenting to ED from [facility] with abnormal labs , noted to have leukocytosis severe anemia and TALON. She has guaiac positive stool so may have GI bleeding as cause of anemia . her abdomen is distended ,  no evidence of AUR     She could have sepsis as she has elevated WBC . She has TALON without any signs of obstructive uropathy so may have ATN or volume depletion     Plan:  - continue with IV zosyn empirically pending cs   - GI to decide if need for EGD   - trend cbc and BMP  - IV fluids as per nephrology   - she was seen by palliative care and she is DNR/DNI   - PT evaluation     Continue with present regime .  Appropriate use of antibiotics and adverse effects reviewed.    I have discussed the above plan of care with patient's family in detail. They expressed understanding of the treatment plan . Risks, benefits and alternatives discussed in detail. I have asked if they have any questions or concerns and appropriately addressed them to the best of my ability .      > 35 minutes spent in direct patient care reviewing  the notes, lab data/ imaging , discussion with multidisciplinary team. All questions were addressed and answered to the best of my capacity .    Thank you for allowing me to participate in the care of your patient .        Kev Lyn MD  [phone]
ID progress note     Name: PETER FREY  Age: 89y  Gender: Female  MRN: [id number]    Interval History-- Events noted, doing well more calm and oriented . family at bedside, had dumont placed . Urine is clear . No nausea or vomiting   Notes reviewed    Past Medical History--  Anxiety  GERD (gastroesophageal reflux disease)  Retention of urine  Agitation  Neck pain  Hyperlipidemia, unspecified hyperlipidemia type  Essential hypertension  Chronic back pain  CAD (coronary artery disease)  Cataract  Hypothyroid  DVT (deep venous thrombosis), right  Pacemaker  S/P coronary artery bypass graft x 1      For details regarding the patient's social history, family history, and other miscellaneous elements, please refer the initial infectious diseases consultation and/or the admitting history and physical examination for this admission.    Allergies--  Allergies    No Known Allergies    Intolerances        Medications--  Antibiotics:  piperacillin/tazobactam IVPB.. 3.375 Gram(s) IV Intermittent every 12 hours    Immunologic:    Other:  acetaminophen  Suppository .. PRN  dextrose 5%.  haloperidol    Injectable PRN  lidocaine   Patch  morphine  - Injectable PRN  pantoprazole  Injectable      Review of Systems--  A 10-point review of systems was obtained.     Pertinent positives and negatives--  Constitutional: No fevers. No Chills. No Rigors.   Cardiovascular: No chest pain. No palpitations.  Respiratory: No shortness of breath. No cough.  Gastrointestinal: No nausea or vomiting. No diarrhea or constipation.   Psychiatric: Pleasant. Appropriate affect.    Review of systems otherwise negative except as previously noted.    Physical Examination--  Vital Signs: T(F): 97.6 (19 @ 13:31), Max: 97.9 (19 @ 06:19)  HR: 69 (19 @ 13:31)  BP: 94/52 (19 @ 13:31)  RR: 16 (19 @ 13:31)  SpO2: 95% (19 @ 13:31)  Wt(kg): --  General: elderly frail -appearing Female in no acute distress.  HEENT: AT/NC. PERRL. EOMI. Anicteric. Conjunctiva pink and moist. Oropharynx clear. Dentition fair.  Neck: Not rigid. No sense of mass.  Nodes: None palpable.  Lungs: Clear bilaterally without rales, wheezing or rhonchi  Heart: Regular rate and rhythm. No Murmur. No rub. No gallop. No palpable thrill.  Abdomen: Bowel sounds present and normoactive. Soft. mild distended. Nontender. No sense of mass. No organomegaly.  Back: No spinal tenderness. No costovertebral angle tenderness.   Extremities: No cyanosis or clubbing. No edema.   Skin: Warm. Dry. Good turgor. No rash. No vasculitic stigmata.  Psychiatric: Appropriate affect and mood for situation.         Laboratory Studies--  CBC                        7.1    20.63 )-----------( 177      ( 31 Dec 2019 05:01 )             21.3       Chemistries      154<H>  |  124<H>  |  193<H>  ----------------------------<  111<H>  4.8   |  18<L>  |  3.50<H>    Ca    7.4<L>      31 Dec 2019 05:01    TPro  5.1<L>  /  Alb  2.7<L>  /  TBili  0.6  /  DBili  x   /  AST  39<H>  /  ALT  23  /  AlkPhos  55      Lactate, Blood: 1.2 mmol/L (19 @ 05:01)  Lactate, Blood: 1.7 mmol/L (19 @ 21:00)  Lactate, Blood: 3.0 mmol/L (19 @ 17:35)      Procalcitonin, Serum (19 @ 17:36)    Procalcitonin, Serum: 0.61:     Urinalysis Basic - ( 30 Dec 2019 18:01 )    Color: Yellow / Appearance: Clear / S.015 / pH: x  Gluc: x / Ketone: Negative  / Bili: Negative / Urobili: Negative   Blood: x / Protein: Negative / Nitrite: Negative   Leuk Esterase: Small / RBC: 3-5 /HPF / WBC 11-25   Sq Epi: x / Non Sq Epi: Few / Bacteria: Few        Culture Data      RECENT CULTURES      RADIOLOGY:  CT Abdomen and Pelvis No Cont (19 @ 19:01) >  FINDINGS:   Lungs: Mild bibasilar atelectasis.   Mediastinum: Small hiatal hernia.     Liver: Normal. No mass.   Gallbladder and bile ducts: Stones at the gallbladder.   Pancreas: Normal. No ductal dilation.   Spleen: Normal. No splenomegaly.   Adrenals:Indeterminate bilateral adrenal nodules, 1.8 cm on the right and 1.4   cm on the left. These are stable presumed benign cortical adenomas  Kidneys and ureters: Indeterminate low attenuation lesions at the kidneys not   fully evaluated on this exam due to lack of IV contrast. These are stable likely represent cysts.  Stomach and bowel: Unremarkable. No obstruction. No mucosal thickening.   Appendix: No evidence of appendicitis.   Intraperitoneal space: Unremarkable. No free air. No significant fluid   collection.   Vasculature: Coronary artery calcifications noted. Aorta demonstrates moderate   atherosclerotic calcification.   Lymph nodes: Unremarkable. No enlarged lymph nodes.     Bladder: Bladder decompressed by a Dumont catheter, but otherwise unremarkable.   Small amount of intraluminal air consistent with instrumentation.   Reproductive: Unremarkable as visualized.   Bones/joints: Previous median sternotomy.   Soft tissues: Unremarkable.     IMPRESSION:   Cholelithiasis.    I agree with the above report by the [facility] radiologist    US Kidney and Bladder (12.31.19 @ 09:11) >  Bilateral renal ultrasound.    The kidney 9.6 the left 9 cm. There is no hydronephrosis shadowing calculus or solid renal mass bilaterally. Several bilateral renal cortical cysts largest on the right kidney measuring up to 3.5 cm. Bladder partially distended with a Dumont in situ. Bladder volume 152-cc.    Impression:    No post renal obstruction. Bilateral renal cysts.      Assessment--    89 year old female pmhx of dementia, CAD s/p CABG, pacemaker, HTN, HLD, Chronic back pain, anxiety, GERD, hypothyroidism presenting to ED from [facility] with abnormal labs , noted to have leukocytosis severe anemia and TALON. She has guaiac positive stool so may have GI bleeding as cause of anemia . her abdomen is distended ,  no evidence of AUR     She could have sepsis as she has elevated WBC . She has TALON without any signs of obstructive uropathy so may have ATN or volume depletion     Plan:  - continue with IV zosyn empirically pending cs   - GI to decide if need for EGD   - trend cbc and BMP  - IV fluids as per nephrology   - she was seen by palliative care and she is DNR/DNI       Continue with present regime .  Appropriate use of antibiotics and adverse effects reviewed.    I have discussed the above plan of care with patient's family in detail. They expressed understanding of the treatment plan . Risks, benefits and alternatives discussed in detail. I have asked if they have any questions or concerns and appropriately addressed them to the best of my ability .      > 35 minutes spent in direct patient care reviewing  the notes, lab data/ imaging , discussion with multidisciplinary team. All questions were addressed and answered to the best of my capacity .    Thank you for allowing me to participate in the care of your patient .        Kev Lyn MD  [phone]
INTERVAL HPI/OVERNIGHT EVENTS:  pt seen and examined  awake alert, denies abd pain  per rn no s/s active gib  labs pending    MEDICATIONS  (STANDING):  dextrose 5%. 1000 milliLiter(s) (80 mL/Hr) IV Continuous <Continuous>  lactobacillus acidophilus 1 Tablet(s) Oral three times a day  lidocaine   Patch 1 Patch Transdermal daily  pantoprazole  Injectable 40 milliGRAM(s) IV Push two times a day  piperacillin/tazobactam IVPB.. 3.375 Gram(s) IV Intermittent every 12 hours    MEDICATIONS  (PRN):  acetaminophen  Suppository .. 650 milliGRAM(s) Rectal every 6 hours PRN Temp greater or equal to 38C (100.4F), Mild Pain (1 - 3)  haloperidol    Injectable 1 milliGRAM(s) IntraMuscular every 6 hours PRN Agitation  morphine  - Injectable 2 milliGRAM(s) IV Push every 6 hours PRN Severe Pain (7 - 10)  QUEtiapine 12.5 milliGRAM(s) Oral two times a day PRN agitation      Allergies    No Known Allergies    Intolerances        Review of Systems:    unable to obtain in entirety     Vital Signs Last 24 Hrs  T(C): 36.7 (02 Jan 2020 05:16), Max: 36.7 (01 Jan 2020 20:20)  T(F): 98.1 (02 Jan 2020 05:16), Max: 98.1 (02 Jan 2020 05:16)  HR: 63 (02 Jan 2020 05:16) (61 - 65)  BP: 131/69 (02 Jan 2020 05:16) (104/53 - 131/69)  BP(mean): --  RR: 16 (02 Jan 2020 05:16) (16 - 18)  SpO2: 94% (02 Jan 2020 05:16) (93% - 95%)    PHYSICAL EXAM:    General:  lying in bed  HEENT:  NC/AT  Abdomen:  soft nt +dt  Extremities:  no edema  Skin:  areas of ecchymosis   Neuro/Psych:  awake alert confused        LABS:                        7.1    12.04 )-----------( 178      ( 01 Jan 2020 16:00 )             22.0     01-01    149<H>  |  119<H>  |  123<H>  ----------------------------<  142<H>  3.6   |  21<L>  |  2.40<H>    Ca    7.6<L>      01 Jan 2020 16:00            RADIOLOGY & ADDITIONAL TESTS:
INTERVAL HPI/OVERNIGHT EVENTS:  pt seen and examined  lethargic in bed, was agitated overnight, passed dark bm per nursing  am labs pending    MEDICATIONS  (STANDING):  cefTRIAXone   IVPB 1000 milliGRAM(s) IV Intermittent every 24 hours  cefTRIAXone   IVPB      dextrose 5%. 1000 milliLiter(s) (50 mL/Hr) IV Continuous <Continuous>  lactobacillus acidophilus 1 Tablet(s) Oral three times a day  lidocaine   Patch 1 Patch Transdermal daily  pantoprazole    Tablet 40 milliGRAM(s) Oral before breakfast    MEDICATIONS  (PRN):  acetaminophen  Suppository .. 650 milliGRAM(s) Rectal every 6 hours PRN Temp greater or equal to 38C (100.4F), Mild Pain (1 - 3)  haloperidol    Injectable 1 milliGRAM(s) IntraMuscular every 6 hours PRN Agitation  morphine  - Injectable 2 milliGRAM(s) IV Push every 6 hours PRN Severe Pain (7 - 10)  QUEtiapine 12.5 milliGRAM(s) Oral two times a day PRN agitation      Allergies    No Known Allergies    Intolerances        Review of Systems:    unable to obtain      Vital Signs Last 24 Hrs  T(C): 36.6 (03 Jan 2020 05:49), Max: 37.2 (02 Jan 2020 20:08)  T(F): 97.8 (03 Jan 2020 05:49), Max: 99 (02 Jan 2020 20:08)  HR: 60 (03 Jan 2020 05:49) (60 - 65)  BP: 101/56 (03 Jan 2020 05:49) (101/56 - 113/63)  BP(mean): --  RR: 18 (03 Jan 2020 05:49) (18 - 19)  SpO2: 95% (03 Jan 2020 05:49) (94% - 97%)    PHYSICAL EXAM:  General:  lying in bed  HEENT:  NC/AT  Abdomen:  soft nt +dt  Extremities:  no edema  Skin:  areas of ecchymosis   Neuro/Psych:  lethargic but arousable        LABS:                        7.3    9.89  )-----------( 189      ( 02 Jan 2020 08:32 )             22.6     01-02    149<H>  |  120<H>  |  93<H>  ----------------------------<  115<H>  3.7   |  21<L>  |  1.90<H>    Ca    7.9<L>      02 Jan 2020 08:32    TPro  5.6<L>  /  Alb  2.5<L>  /  TBili  0.6  /  DBili  x   /  AST  36  /  ALT  23  /  AlkPhos  59  01-02          RADIOLOGY & ADDITIONAL TESTS:
INTERVAL HPI/OVERNIGHT EVENTS:  pt seen and examined  more awake alert  yelling and agitated  no s/s active gib per rn   labs pending    MEDICATIONS  (STANDING):  dextrose 5%. 1000 milliLiter(s) (80 mL/Hr) IV Continuous <Continuous>  lidocaine   Patch 1 Patch Transdermal daily  pantoprazole  Injectable 40 milliGRAM(s) IV Push two times a day  piperacillin/tazobactam IVPB.. 3.375 Gram(s) IV Intermittent every 12 hours    MEDICATIONS  (PRN):  acetaminophen  Suppository .. 650 milliGRAM(s) Rectal every 6 hours PRN Temp greater or equal to 38C (100.4F), Mild Pain (1 - 3)  haloperidol    Injectable 1 milliGRAM(s) IntraMuscular every 6 hours PRN Agitation  morphine  - Injectable 2 milliGRAM(s) IV Push every 6 hours PRN Severe Pain (7 - 10)      Allergies    No Known Allergies    Intolerances        Review of Systems:    unable to obtain    Vital Signs Last 24 Hrs  T(C): 36.8 (2020 05:33), Max: 36.8 (31 Dec 2019 20:27)  T(F): 98.3 (2020 05:33), Max: 98.3 (31 Dec 2019 20:27)  HR: 73 (2020 05:33) (69 - 73)  BP: 114/61 (2020 05:33) (94/52 - 114/61)  BP(mean): --  RR: 18 (2020 05:33) (16 - 18)  SpO2: 96% (2020 05:33) (94% - 96%)    PHYSICAL EXAM:    General:  lying in bed  HEENT:  NC/AT  Abdomen:  soft appears nt +dt  Extremities:  no edema  Skin:  areas of ecchymosis   Neuro/Psych:  awake alert agitated/confused    LABS:                        7.1    20.63 )-----------( 177      ( 31 Dec 2019 05:01 )             21.3     12-    154<H>  |  124<H>  |  193<H>  ----------------------------<  111<H>  4.8   |  18<L>  |  3.50<H>    Ca    7.4<L>      31 Dec 2019 05:01    TPro  5.1<L>  /  Alb  2.7<L>  /  TBili  0.6  /  DBili  x   /  AST  39<H>  /  ALT  23  /  AlkPhos  55  12-31    PT/INR - ( 30 Dec 2019 17:36 )   PT: 12.1 sec;   INR: 1.07 ratio         PTT - ( 30 Dec 2019 17:36 )  PTT:16.6 sec  Urinalysis Basic - ( 30 Dec 2019 18:01 )    Color: Yellow / Appearance: Clear / S.015 / pH: x  Gluc: x / Ketone: Negative  / Bili: Negative / Urobili: Negative   Blood: x / Protein: Negative / Nitrite: Negative   Leuk Esterase: Small / RBC: 3-5 /HPF / WBC 11-25   Sq Epi: x / Non Sq Epi: Few / Bacteria: Few        RADIOLOGY & ADDITIONAL TESTS:
PETER FREY  89y  Female    Patient is a 89y old  Female who presents with a chief complaint of abnormal labs (31 Dec 2019 06:26)    lethargic but arousable.       PAST MEDICAL & SURGICAL HISTORY:  Anxiety  GERD (gastroesophageal reflux disease)  Retention of urine  Agitation  Neck pain  Hyperlipidemia, unspecified hyperlipidemia type  Essential hypertension  Chronic back pain  CAD (coronary artery disease)  Cataract: right eye  Hypothyroid  DVT (deep venous thrombosis), right  Pacemaker  S/P coronary artery bypass graft x 1          PHYSICAL EXAM:    T(C): 36.6 (19 @ 06:19), Max: 36.6 (19 @ 06:19)  HR: 69 (19 @ 06:19) (62 - 92)  BP: 90/52 (19 @ 06:19) (84/37 - 104/47)  RR: 17 (19 @ 06:19) (16 - 17)  SpO2: 97% (19 @ 06:19) (91% - 97%)  Wt(kg): --    I&O's Detail    30 Dec 2019 07:  -  31 Dec 2019 07:00  --------------------------------------------------------  IN:    IV PiggyBack: 25 mL    sodium chloride 0.45%: 400 mL  Total IN: 425 mL    OUT:    Indwelling Catheter - Urethral: 700 mL  Total OUT: 700 mL    Total NET: -275 mL      31 Dec 2019 07:  -  31 Dec 2019 12:58  --------------------------------------------------------  IN:    dextrose 5%.: 120 mL    sodium chloride 0.45%: 200 mL  Total IN: 320 mL    OUT:  Total OUT: 0 mL    Total NET: 320 mL          Respiratory: clear anteriorly, decreased BS at bases  Cardiovascular: S1 S2  Gastrointestinal: soft NT ND +BS  Extremities: one plus edema   Neuro: Awake and alert    MEDICATIONS  (STANDING):  dextrose 5%. 1000 milliLiter(s) (80 mL/Hr) IV Continuous <Continuous>  lidocaine   Patch 1 Patch Transdermal daily  pantoprazole  Injectable 40 milliGRAM(s) IV Push two times a day  piperacillin/tazobactam IVPB.. 3.375 Gram(s) IV Intermittent every 12 hours    MEDICATIONS  (PRN):  acetaminophen  Suppository .. 650 milliGRAM(s) Rectal every 6 hours PRN Temp greater or equal to 38C (100.4F), Mild Pain (1 - 3)  haloperidol    Injectable 1 milliGRAM(s) IntraMuscular every 6 hours PRN Agitation  morphine  - Injectable 2 milliGRAM(s) IV Push every 6 hours PRN Severe Pain (7 - 10)                            7.1    20.63 )-----------( 177      ( 31 Dec 2019 05:01 )             21.3           154<H>  |  124<H>  |  193<H>  ----------------------------<  111<H>  4.8   |  18<L>  |  3.50<H>    Ca    7.4<L>      31 Dec 2019 05:01    TPro  5.1<L>  /  Alb  2.7<L>  /  TBili  0.6  /  DBili  x   /  AST  39<H>  /  ALT  23  /  AlkPhos  55        Creatinine Trend: Creatinine Trend: 3.50<--, 3.70<--, 3.70<--    Urinalysis Basic - ( 30 Dec 2019 18:01 )    Color: Yellow / Appearance: Clear / S.015 / pH: x  Gluc: x / Ketone: Negative  / Bili: Negative / Urobili: Negative   Blood: x / Protein: Negative / Nitrite: Negative   Leuk Esterase: Small / RBC: 3-5 /HPF / WBC 11-25   Sq Epi: x / Non Sq Epi: Few / Bacteria: Few      < from: US Kidney and Bladder (19 @ 09:11) >  EXAM:  US KIDNEYS AND BLADDER                            PROCEDURE DATE:  2019          INTERPRETATION:    History: Acute kidney injury.    Bilateral renal ultrasound.    The kidney 9.6 the left 9 cm. There is no hydronephrosis shadowing calculus or solid renal mass bilaterally. Several bilateral renal cortical cysts largest on the right kidney measuring up to 3.5 cm. Bladder partially distended with a Kumar in situ. Bladder volume 152-cc.    Impression:    No post renal obstruction. Bilateral renal cysts.
Patient is a 89y old  Female who presents with a chief complaint of abnormal labs (01 Jan 2020 07:44)  Patient seen in follow up for TALON.    Pt again refused labs earlier.     MEDICATIONS  (STANDING):  cefTRIAXone   IVPB 1000 milliGRAM(s) IV Intermittent every 24 hours  cefTRIAXone   IVPB      dextrose 5%. 1000 milliLiter(s) (50 mL/Hr) IV Continuous <Continuous>  lactobacillus acidophilus 1 Tablet(s) Oral three times a day  lidocaine   Patch 1 Patch Transdermal daily  pantoprazole    Tablet 40 milliGRAM(s) Oral before breakfast    MEDICATIONS  (PRN):  acetaminophen  Suppository .. 650 milliGRAM(s) Rectal every 6 hours PRN Temp greater or equal to 38C (100.4F), Mild Pain (1 - 3)  haloperidol    Injectable 1 milliGRAM(s) IntraMuscular every 6 hours PRN Agitation  morphine  - Injectable 2 milliGRAM(s) IV Push every 6 hours PRN Severe Pain (7 - 10)  QUEtiapine 12.5 milliGRAM(s) Oral two times a day PRN agitation    T(C): 36.6 (01-03-20 @ 05:49), Max: 37.2 (01-02-20 @ 20:08)  HR: 60 (01-03-20 @ 05:49) (60 - 65)  BP: 101/56 (01-03-20 @ 05:49) (101/56 - 131/69)  RR: 18 (01-03-20 @ 05:49)  SpO2: 95% (01-03-20 @ 05:49)  Wt(kg): --  I&O's Detail    02 Jan 2020 07:01  -  03 Jan 2020 07:00  --------------------------------------------------------  IN:    dextrose 5%.: 550 mL  Total IN: 550 mL    OUT:    Indwelling Catheter - Urethral: 1150 mL  Total OUT: 1150 mL    Total NET: -600 mL          PHYSICAL EXAM:  General: NAD  Respiratory: b/l air entry  Cardiovascular: S1 S2  Gastrointestinal: soft  Extremities:  no edema                       LABORATORY:                        7.5    10.09 )-----------( 185      ( 03 Jan 2020 08:53 )             24.3     01-03    147<H>  |  116<H>  |  68<H>  ----------------------------<  104<H>  4.3   |  24  |  1.50<H>    Ca    7.9<L>      03 Jan 2020 08:53    TPro  5.6<L>  /  Alb  2.5<L>  /  TBili  0.6  /  DBili  x   /  AST  36  /  ALT  23  /  AlkPhos  59  01-02    Sodium, Serum: 147 mmol/L (01-03 @ 08:53)  Sodium, Serum: 149 mmol/L (01-02 @ 08:32)  Sodium, Serum: 149 mmol/L (01-01 @ 16:00)  Sodium, Serum: 151 mmol/L (01-01 @ 14:38)    Potassium, Serum: 4.3 mmol/L (01-03 @ 08:53)  Potassium, Serum: 3.7 mmol/L (01-02 @ 08:32)  Potassium, Serum: 3.6 mmol/L (01-01 @ 16:00)  Potassium, Serum: 3.6 mmol/L (01-01 @ 14:38)    Hemoglobin: 7.5 g/dL (01-03 @ 08:53)  Hemoglobin: 7.3 g/dL (01-02 @ 08:32)  Hemoglobin: 7.1 g/dL (01-01 @ 16:00)  Hemoglobin: 7.4 g/dL (01-01 @ 14:38)    Creatinine, Serum 1.50 (01-03 @ 08:53)  Creatinine, Serum 1.90 (01-02 @ 08:32)  Creatinine, Serum 2.40 (01-01 @ 16:00)  Creatinine, Serum 2.40 (01-01 @ 14:38)        LIVER FUNCTIONS - ( 02 Jan 2020 08:32 )  Alb: 2.5 g/dL / Pro: 5.6 g/dL / ALK PHOS: 59 U/L / ALT: 23 U/L / AST: 36 U/L / GGT: x
Patient is a 89y old  Female who presents with a chief complaint of abnormal labs (01 Jan 2020 07:44)  Patient seen in follow up for TALON.    Pt again refused labs earlier.     PAST MEDICAL HISTORY:  Anxiety  GERD (gastroesophageal reflux disease)  Retention of urine  Agitation  Neck pain  Hyperlipidemia, unspecified hyperlipidemia type  Essential hypertension  Chronic back pain  CAD (coronary artery disease)  Cataract  Hypothyroid  DVT (deep venous thrombosis), right    MEDICATIONS  (STANDING):  dextrose 5%. 1000 milliLiter(s) (80 mL/Hr) IV Continuous <Continuous>  lactobacillus acidophilus 1 Tablet(s) Oral three times a day  lidocaine   Patch 1 Patch Transdermal daily  pantoprazole    Tablet 40 milliGRAM(s) Oral before breakfast  piperacillin/tazobactam IVPB.. 3.375 Gram(s) IV Intermittent every 12 hours    MEDICATIONS  (PRN):  acetaminophen  Suppository .. 650 milliGRAM(s) Rectal every 6 hours PRN Temp greater or equal to 38C (100.4F), Mild Pain (1 - 3)  haloperidol    Injectable 1 milliGRAM(s) IntraMuscular every 6 hours PRN Agitation  morphine  - Injectable 2 milliGRAM(s) IV Push every 6 hours PRN Severe Pain (7 - 10)  QUEtiapine 12.5 milliGRAM(s) Oral two times a day PRN agitation    T(C): 36.7 (01-02-20 @ 05:16), Max: 36.8 (12-31-19 @ 20:27)  HR: 63 (01-02-20 @ 05:16) (61 - 73)  BP: 131/69 (01-02-20 @ 05:16) (94/52 - 131/69)  RR: 16 (01-02-20 @ 05:16)  SpO2: 94% (01-02-20 @ 05:16)  Wt(kg): --  I&O's Detail    01 Jan 2020 07:01  -  02 Jan 2020 07:00  --------------------------------------------------------  IN:    dextrose 5%.: 960 mL  Total IN: 960 mL    OUT:    Indwelling Catheter - Urethral: 1800 mL  Total OUT: 1800 mL    Total NET: -840 mL            PHYSICAL EXAM:  General: NAD  Respiratory: b/l air entry  Cardiovascular: S1 S2  Gastrointestinal: soft  Extremities:  no edema                       LABORATORY:                        7.3    9.89  )-----------( 189      ( 02 Jan 2020 08:32 )             22.6     01-02    149<H>  |  120<H>  |  93<H>  ----------------------------<  115<H>  3.7   |  21<L>  |  1.90<H>    Ca    7.9<L>      02 Jan 2020 08:32    TPro  5.6<L>  /  Alb  2.5<L>  /  TBili  0.6  /  DBili  x   /  AST  36  /  ALT  23  /  AlkPhos  59  01-02    Sodium, Serum: 149 mmol/L (01-02 @ 08:32)  Sodium, Serum: 149 mmol/L (01-01 @ 16:00)  Sodium, Serum: 151 mmol/L (01-01 @ 14:38)    Potassium, Serum: 3.7 mmol/L (01-02 @ 08:32)  Potassium, Serum: 3.6 mmol/L (01-01 @ 16:00)  Potassium, Serum: 3.6 mmol/L (01-01 @ 14:38)    Hemoglobin: 7.3 g/dL (01-02 @ 08:32)  Hemoglobin: 7.1 g/dL (01-01 @ 16:00)  Hemoglobin: 7.4 g/dL (01-01 @ 14:38)  Hemoglobin: 7.1 g/dL (12-31 @ 05:01)    Creatinine, Serum 1.90 (01-02 @ 08:32)  Creatinine, Serum 2.40 (01-01 @ 16:00)  Creatinine, Serum 2.40 (01-01 @ 14:38)  Creatinine, Serum 3.50 (12-31 @ 05:01)        LIVER FUNCTIONS - ( 02 Jan 2020 08:32 )  Alb: 2.5 g/dL / Pro: 5.6 g/dL / ALK PHOS: 59 U/L / ALT: 23 U/L / AST: 36 U/L / GGT: x
Patient is a 89y old  Female who presents with a chief complaint of abnormal labs (02 Jan 2020 14:56)      INTERVAL /OVERNIGHT EVENTS: doing better, alert and awake    MEDICATIONS  (STANDING):  cefTRIAXone   IVPB 1000 milliGRAM(s) IV Intermittent once  cefTRIAXone   IVPB      dextrose 5%. 1000 milliLiter(s) (50 mL/Hr) IV Continuous <Continuous>  lactobacillus acidophilus 1 Tablet(s) Oral three times a day  lidocaine   Patch 1 Patch Transdermal daily  pantoprazole    Tablet 40 milliGRAM(s) Oral before breakfast    MEDICATIONS  (PRN):  acetaminophen  Suppository .. 650 milliGRAM(s) Rectal every 6 hours PRN Temp greater or equal to 38C (100.4F), Mild Pain (1 - 3)  haloperidol    Injectable 1 milliGRAM(s) IntraMuscular every 6 hours PRN Agitation  morphine  - Injectable 2 milliGRAM(s) IV Push every 6 hours PRN Severe Pain (7 - 10)  QUEtiapine 12.5 milliGRAM(s) Oral two times a day PRN agitation      Allergies    No Known Allergies    Intolerances        REVIEW OF SYSTEMS: unable to provide    Vital Signs Last 24 Hrs  T(C): 37.1 (02 Jan 2020 13:02), Max: 37.1 (02 Jan 2020 13:02)  T(F): 98.7 (02 Jan 2020 13:02), Max: 98.7 (02 Jan 2020 13:02)  HR: 63 (02 Jan 2020 13:02) (60 - 65)  BP: 104/46 (02 Jan 2020 13:02) (104/46 - 131/69)  BP(mean): --  RR: 19 (02 Jan 2020 13:02) (16 - 19)  SpO2: 97% (02 Jan 2020 13:02) (93% - 97%)    PHYSICAL EXAM:  GENERAL: NAD, well-groomed, well-developed  HEAD:  Atraumatic, Normocephalic  EYES: EOMI, PERRLA, conjunctiva and sclera clear  ENMT: No tonsillar erythema, exudates, or enlargement; Moist mucous membranes, Good dentition, No lesions  NECK: Supple, No JVD, Normal thyroid  NERVOUS SYSTEM:  Alert & awake; Motor Strength 5/5 B/L upper and lower extremities; DTRs 2+ intact and symmetric  CHEST/LUNG: Clear to auscultation bilaterally; No rales, rhonchi, wheezing, or rubs  HEART: Regular rate and rhythm; No murmurs, rubs, or gallops  ABDOMEN: Soft, Nontender, Nondistended; Bowel sounds present  EXTREMITIES:  2+ Peripheral Pulses, No clubbing, cyanosis, or edema  LYMPH: No lymphadenopathy noted  SKIN: No rashes or lesions    LABS:                        7.3    9.89  )-----------( 189      ( 02 Jan 2020 08:32 )             22.6     02 Jan 2020 08:32    149    |  120    |  93     ----------------------------<  115    3.7     |  21     |  1.90     Ca    7.9        02 Jan 2020 08:32    TPro  5.6    /  Alb  2.5    /  TBili  0.6    /  DBili  x      /  AST  36     /  ALT  23     /  AlkPhos  59     02 Jan 2020 08:32        CAPILLARY BLOOD GLUCOSE          RADIOLOGY & ADDITIONAL TESTS:    Notes Reviewed:  [x ] YES  [ ] NO    Care Discussed with Consultants/Other Providers [x ] YES  [ ] NO
Patient is a 89y old  Female who presents with a chief complaint of abnormal labs (2020 07:44)  Patient seen in follow up for TALON.    Pt more awake. Family at bedside. Pt refused labs earlier.     PAST MEDICAL HISTORY:  Anxiety  GERD (gastroesophageal reflux disease)  Retention of urine  Agitation  Neck pain  Hyperlipidemia, unspecified hyperlipidemia type  Essential hypertension  Chronic back pain  CAD (coronary artery disease)  Cataract  Hypothyroid  DVT (deep venous thrombosis), right    MEDICATIONS  (STANDING):  dextrose 5%. 1000 milliLiter(s) (80 mL/Hr) IV Continuous <Continuous>  lidocaine   Patch 1 Patch Transdermal daily  pantoprazole  Injectable 40 milliGRAM(s) IV Push two times a day  piperacillin/tazobactam IVPB.. 3.375 Gram(s) IV Intermittent every 12 hours    MEDICATIONS  (PRN):  acetaminophen  Suppository .. 650 milliGRAM(s) Rectal every 6 hours PRN Temp greater or equal to 38C (100.4F), Mild Pain (1 - 3)  haloperidol    Injectable 1 milliGRAM(s) IntraMuscular every 6 hours PRN Agitation  morphine  - Injectable 2 milliGRAM(s) IV Push every 6 hours PRN Severe Pain (7 - 10)    T(C): 36.8 (20 @ 05:33), Max: 36.8 (19 @ 20:27)  HR: 73 (20 @ 05:33) (69 - 73)  BP: 114/61 (20 @ 05:33) (90/52 - 114/61)  RR: 18 (20 @ 05:33) (16 - 18)  SpO2: 96% (20 @ 05:33) (94% - 97%)  Wt(kg): --  I&O's Detail    31 Dec 2019 07:01  -  2020 07:00  --------------------------------------------------------  IN:    dextrose 5%.: 1560 mL    IV PiggyBack: 100 mL    Oral Fluid: 120 mL    sodium chloride 0.45%: 200 mL  Total IN: 1980 mL    OUT:    Indwelling Catheter - Urethral: 1850 mL  Total OUT: 1850 mL    Total NET: 130 mL          PHYSICAL EXAM:  General: NAD  Respiratory: b/l air entry  Cardiovascular: S1 S2  Gastrointestinal: soft  Extremities:  no edema                          7.1    20.63 )-----------( 177      ( 31 Dec 2019 05:01 )             21.3         154<H>  |  124<H>  |  193<H>  ----------------------------<  111<H>  4.8   |  18<L>  |  3.50<H>    Ca    7.4<L>      31 Dec 2019 05:01    TPro  5.1<L>  /  Alb  2.7<L>  /  TBili  0.6  /  DBili  x   /  AST  39<H>  /  ALT  23  /  AlkPhos  55          LIVER FUNCTIONS - ( 31 Dec 2019 05:01 )  Alb: 2.7 g/dL / Pro: 5.1 g/dL / ALK PHOS: 55 U/L / ALT: 23 U/L / AST: 39 U/L / GGT: x           Urinalysis Basic - ( 30 Dec 2019 18:01 )    Color: Yellow / Appearance: Clear / S.015 / pH: x  Gluc: x / Ketone: Negative  / Bili: Negative / Urobili: Negative   Blood: x / Protein: Negative / Nitrite: Negative   Leuk Esterase: Small / RBC: 3-5 /HPF / WBC 11-25   Sq Epi: x / Non Sq Epi: Few / Bacteria: Few        Sodium, Serum: 154 ( @ 05:01)  Sodium, Serum: 149 ( @ 20:41)  Sodium, Serum: 149 ( @ 11:18)    Creatinine, Serum: 3.50 ( @ 05:01)  Creatinine, Serum: 3.70 ( @ 20:41)  Creatinine, Serum: 3.70 ( @ 11:18)    Potassium, Serum: 4.8 ( @ 05:01)  Potassium, Serum: 5.4 ( @ 20:41)  Potassium, Serum: 6.0 ( @ 11:18)    Hemoglobin: 7.1 ( @ 05:01)  Hemoglobin: 4.9 ( @ 11:18)      < from: US Kidney and Bladder (. @ 09:11) >    EXAM:  US KIDNEYS AND BLADDER                            PROCEDURE DATE:  2019          INTERPRETATION:    History: Acute kidney injury.    Bilateral renal ultrasound.    The kidney 9.6 the left 9 cm. There is no hydronephrosis shadowing calculus or solid renal mass bilaterally. Several bilateral renal cortical cysts largest on the right kidney measuring up to 3.5 cm. Bladder partially distended with a Kumar in situ. Bladder volume 152-cc.    Impression:    No post renal obstruction. Bilateral renal cysts.          RANJIT BOOTHE M.D., ATTENDING RADIOLOGIST  This document has been electronically signed. Dec 31 2019  9:42AM                < end of copied text >
Patient is a 89y old  Female who presents with a chief complaint of abnormal labs (2020 13:41)      INTERVAL /OVERNIGHT EVENTS: more alert and awake    MEDICATIONS  (STANDING):  dextrose 5%. 1000 milliLiter(s) (80 mL/Hr) IV Continuous <Continuous>  lactobacillus acidophilus 1 Tablet(s) Oral three times a day  lidocaine   Patch 1 Patch Transdermal daily  pantoprazole  Injectable 40 milliGRAM(s) IV Push two times a day  piperacillin/tazobactam IVPB.. 3.375 Gram(s) IV Intermittent every 12 hours    MEDICATIONS  (PRN):  acetaminophen  Suppository .. 650 milliGRAM(s) Rectal every 6 hours PRN Temp greater or equal to 38C (100.4F), Mild Pain (1 - 3)  haloperidol    Injectable 1 milliGRAM(s) IntraMuscular every 6 hours PRN Agitation  morphine  - Injectable 2 milliGRAM(s) IV Push every 6 hours PRN Severe Pain (7 - 10)      Allergies    No Known Allergies    Intolerances        REVIEW OF SYSTEMS: unable to obtain      Vital Signs Last 24 Hrs  T(C): 36.5 (2020 13:52), Max: 36.8 (31 Dec 2019 20:27)  T(F): 97.7 (2020 13:52), Max: 98.3 (31 Dec 2019 20:27)  HR: 61 (2020 13:52) (61 - 73)  BP: 104/53 (2020 13:52) (95/59 - 114/61)  BP(mean): --  RR: 18 (2020 13:52) (17 - 18)  SpO2: 95% (2020 13:52) (94% - 96%)    PHYSICAL EXAM:  GENERAL: NAD, well-groomed, well-developed  HEAD:  Atraumatic, Normocephalic  EYES: EOMI, PERRLA, conjunctiva and sclera clear  ENMT: No tonsillar erythema, exudates, or enlargement; Moist mucous membranes, Good dentition, No lesions  NECK: Supple, No JVD, Normal thyroid  NERVOUS SYSTEM:  Alert & awake and confused,  Motor Strength 5/5 B/L upper and lower extremities; DTRs 2+ intact and symmetric  CHEST/LUNG: Clear to auscultation bilaterally; No rales, rhonchi, wheezing, or rubs  HEART: Regular rate and rhythm; No murmurs, rubs, or gallops  ABDOMEN: Soft, Nontender, Nondistended; Bowel sounds present  EXTREMITIES:  2+ Peripheral Pulses, No clubbing, cyanosis, or edema  LYMPH: No lymphadenopathy noted  SKIN: No rashes or lesions    LABS:                        7.4    11.65 )-----------( 187      ( 2020 14:38 )             22.5     2020 14:38    151    |  121    |  131    ----------------------------<  155    3.6     |  21     |  2.40     Ca    7.7        2020 14:38      PT/INR - ( 30 Dec 2019 17:36 )   PT: 12.1 sec;   INR: 1.07 ratio         PTT - ( 30 Dec 2019 17:36 )  PTT:16.6 sec  Urinalysis Basic - ( 30 Dec 2019 18:01 )    Color: Yellow / Appearance: Clear / S.015 / pH: x  Gluc: x / Ketone: Negative  / Bili: Negative / Urobili: Negative   Blood: x / Protein: Negative / Nitrite: Negative   Leuk Esterase: Small / RBC: 3-5 /HPF / WBC 11-25   Sq Epi: x / Non Sq Epi: Few / Bacteria: Few      CAPILLARY BLOOD GLUCOSE          RADIOLOGY & ADDITIONAL TESTS:    Notes Reviewed:  [x ] YES  [ ] NO    Care Discussed with Consultants/Other Providers [x ] YES  [ ] NO
Patient is a 89y old  Female who presents with a chief complaint of abnormal labs (31 Dec 2019 12:58)      INTERVAL /OVERNIGHT EVENTS: more alert and awake today    MEDICATIONS  (STANDING):  dextrose 5%. 1000 milliLiter(s) (80 mL/Hr) IV Continuous <Continuous>  lidocaine   Patch 1 Patch Transdermal daily  pantoprazole  Injectable 40 milliGRAM(s) IV Push two times a day  piperacillin/tazobactam IVPB.. 3.375 Gram(s) IV Intermittent every 12 hours    MEDICATIONS  (PRN):  acetaminophen  Suppository .. 650 milliGRAM(s) Rectal every 6 hours PRN Temp greater or equal to 38C (100.4F), Mild Pain (1 - 3)  haloperidol    Injectable 1 milliGRAM(s) IntraMuscular every 6 hours PRN Agitation  morphine  - Injectable 2 milliGRAM(s) IV Push every 6 hours PRN Severe Pain (7 - 10)      Allergies    No Known Allergies    Intolerances        REVIEW OF SYSTEMS: unable to obtain    Vital Signs Last 24 Hrs  T(C): 36.4 (31 Dec 2019 13:31), Max: 36.6 (31 Dec 2019 06:19)  T(F): 97.6 (31 Dec 2019 13:31), Max: 97.9 (31 Dec 2019 06:19)  HR: 69 (31 Dec 2019 13:31) (63 - 92)  BP: 94/52 (31 Dec 2019 13:31) (85/48 - 104/47)  BP(mean): --  RR: 16 (31 Dec 2019 13:31) (16 - 17)  SpO2: 95% (31 Dec 2019 13:31) (93% - 97%)    PHYSICAL EXAM:  GENERAL: NAD, well-groomed, well-developed  HEAD:  Atraumatic, Normocephalic  EYES: EOMI, PERRLA, conjunctiva and sclera clear  ENMT: No tonsillar erythema, exudates, or enlargement; Moist mucous membranes, Good dentition, No lesions  NECK: Supple, No JVD, Normal thyroid  NERVOUS SYSTEM:  Alert & awake, Motor Strength 5/5 B/L upper and lower extremities; DTRs 2+ intact and symmetric  CHEST/LUNG: Clear to auscultation bilaterally; No rales, rhonchi, wheezing, or rubs  HEART: Regular rate and rhythm; No murmurs, rubs, or gallops  ABDOMEN: Soft, Nontender, Nondistended; Bowel sounds present  EXTREMITIES:  2+ Peripheral Pulses, No clubbing, cyanosis, or edema  LYMPH: No lymphadenopathy noted  SKIN: No rashes or lesions    LABS:                        7.1    20.63 )-----------( 177      ( 31 Dec 2019 05:01 )             21.3     31 Dec 2019 05:01    154    |  124    |  193    ----------------------------<  111    4.8     |  18     |  3.50     Ca    7.4        31 Dec 2019 05:01    TPro  5.1    /  Alb  2.7    /  TBili  0.6    /  DBili  x      /  AST  39     /  ALT  23     /  AlkPhos  55     31 Dec 2019 05:01    PT/INR - ( 30 Dec 2019 17:36 )   PT: 12.1 sec;   INR: 1.07 ratio         PTT - ( 30 Dec 2019 17:36 )  PTT:16.6 sec  Urinalysis Basic - ( 30 Dec 2019 18:01 )    Color: Yellow / Appearance: Clear / S.015 / pH: x  Gluc: x / Ketone: Negative  / Bili: Negative / Urobili: Negative   Blood: x / Protein: Negative / Nitrite: Negative   Leuk Esterase: Small / RBC: 3-5 /HPF / WBC 11-25   Sq Epi: x / Non Sq Epi: Few / Bacteria: Few      CAPILLARY BLOOD GLUCOSE          RADIOLOGY & ADDITIONAL TESTS:    Notes Reviewed:  [x ] YES  [ ] NO    Care Discussed with Consultants/Other Providers [x ] YES  [ ] NO

## 2020-01-03 NOTE — DISCHARGE NOTE PROVIDER - CARE PROVIDER_API CALL
Esau Allen; MBBS)  Internal Medicine  [address] [place] [address]  Phone: [phone]  Fax: [phone]  Follow Up Time:     Salvador Brumfield)  Medicine  [address] [facility], [address] [place] [address]  Phone: [phone]  Fax: [phone]  Follow Up Time:

## 2020-01-03 NOTE — PROGRESS NOTE ADULT - ASSESSMENT
anemia  renal failure  gi bleed    suspect uremic bleeding in setting of renal failure  passed dark stool overnight, f/u am labs  monitor cbc, transfuse prn  cont proton pump inhibitor  diet as tolerated  dw son, no plan for upper gastrointestinal endoscopy; supportive care from gi perspective  pall care following, pt dnr/dni, goc discussions ongoing

## 2020-01-03 NOTE — PROGRESS NOTE ADULT - ASSESSMENT
1.	TALON: Prerenal azotemia, on ARB  2.	Anemia, GI bleed  3.	Shock (Hypovolemic, ? Sepsis)  4.	Hyperkalemia    Improving renal indices. To continue IV hydration. Off ARB. Monitor BP trend. IV abx. ID follow up.   Monitor h/h trend. Transfuse PRBC's PRN. GI follow up. Supportive care. Will follow electrolytes and renal function trend.   Avoid nephrotoxic meds as possible. 1.	TALON: Prerenal azotemia, on ARB  2.	Anemia, GI bleed  3.	Shock (Hypovolemic, ? Sepsis)  4.	Hyperkalemia    Improving renal indices. Will d/c IVF. Off ARB. Monitor BP trend. IV abx. ID follow up. D/c dumont.   Monitor h/h trend. Transfuse PRBC's PRN. GI follow up. Supportive care. Will follow electrolytes and renal function trend.   Avoid nephrotoxic meds as possible. D/c planning.

## 2020-01-03 NOTE — CHART NOTE - NSCHARTNOTEFT_GEN_A_CORE
Do you have Advance Directives (HCP / LV / Organ donation / Documentation of oral advance Directive):   (  x  )  yes    (      )    NO                                                                            Do you have LV - Living will :                                                                                                                                             (    )  yes    (   x   )   No    Do you have HCP - Health Care Proxy:                                                                                                                            (   x  )  yes   (       ) N0    Do you have DNR- Do Not Resuscitate :                                                                                                                           (    x  )  yes  (        )  No    Do you have DNI- Do Not intubate  :                                                                                                                               (   x   )  yes   (       ) No    Do you have MOLST - Medical orders for Life sustaining treatment  :                                                                    (    x  ) yes    (       ) No    Decision Maker :  (     ) Patient     (   x   )  HCA   (     ) Public Health Law Surrogate     (      ) Surrogate  (       ) Guardian    Goals of Care :  (      )   Complete Care     (       ) No Limitations                              (       )   Comfort Care       (       )  Hospice                               (    x  )   Limited medical Intervention / s    Medical Interventions :   (        )   CPR       (   x     )  DNR                                               (        )  Intubation with MV - Mechanical Ventilation  (      ) BIPAP/CPAP    (  x       )   DNI                                               (         )  Artificial Nutrition -  IVF, TPN / PPN, Tube Feeds             (     x    )   No Feeding Tube                                                (     x   ) Use Antibiotics                         (          ) No Antibiotics                                                (    x     ) Blood and Blood Products     (         )   No Blood or Blood products                                                (          )  Dialysis                                    (    x     )  No Dialysis                                                (          )  Medical Management only  (    x     )  No Invasive Interventions or Surgery  Time spent :                        (   x    ) up to 30 minutes                       (           )   more than 30 minutes  GOC reviewed and discussed with multiple family members on multiple occasions

## 2020-01-03 NOTE — DISCHARGE NOTE PROVIDER - NSDCMRMEDTOKEN_GEN_ALL_CORE_FT
acetaminophen 325 mg oral tablet: 2 tab(s) orally every 6 hours, As Needed  allopurinol 100 mg oral tablet: 1 tab(s) orally once a day  amLODIPine 10 mg oral tablet: 1 tab(s) orally once a day  ascorbic acid 500 mg oral tablet: 1 tab(s) orally once a day  aspirin 81 mg oral tablet, chewable: 1 tab(s) orally once a day  atorvastatin 20 mg oral tablet: 1 tab(s) orally once a day (at bedtime)  Colace 100 mg oral capsule: 3 cap(s) orally once a day (at bedtime)  Feosol 325 mg (65 mg elemental iron) oral tablet: 1 tab(s) orally once a day  Teresa-alyssa 8.6 mg oral tablet: 2 tab(s) orally once a day (at bedtime)  lactobacillus acidophilus oral capsule: orally once a day  Levoxyl 137 mcg (0.137 mg) oral tablet: 1 tab(s) orally once a day  lidocaine 5% topical film: Apply topically to affected area once a day  Multiple Vitamins with Minerals oral tablet: 1 tab(s) orally once a day  pantoprazole 40 mg oral delayed release tablet: 1 tab(s) orally once a day (before a meal)  Vantin 100 mg oral tablet: 1 tab(s) orally every 12 hours for 5 days  Zantac 150 oral tablet: 2 tab(s) orally once a day (at bedtime)

## 2020-01-03 NOTE — PROGRESS NOTE ADULT - REASON FOR ADMISSION
abnormal labs

## 2020-01-03 NOTE — PROGRESS NOTE ADULT - PROBLEM SELECTOR PLAN 1
PRBC transfusion PRN  serial CBC, stable H and H  GIB - ? upper
PRBC transfusion PRN  serial CBC, stable H and H  GIB - ? upper, refusing GI work up
anemia, s/p PRBC  sepsis eval, on ZOSYN  ID eval noted  GI cx noted  supportive medical regimen  haldol prn for agitation, morphine prn for pain  oral and skin care  am labs pending  supportive medical regimen  MOLST reviewed and updated, GOC documented, pt is DNR DNI  will follow  prognosis guarded to poor
anemia, s/p PRBC  sepsis eval, on ZOSYN  ID eval noted  GI cx noted  swallow eval noted -   supportive medical regimen  haldol prn for agitation, morphine prn for pain  oral and skin care  am labs pending  supportive medical regimen  MOLST reviewed and updated, GOC documented, pt is DNR DNI  will follow  prognosis guarded to poor
anemia, s/p PRBC  sepsis eval, on ZOSYN  ID eval noted  GI cx noted  swallow eval noted -   supportive medical regimen  haldol prn for agitation, morphine prn for pain, will add Seroquel for prn use  oral and skin care  supportive medical regimen  MOLST reviewed and updated, GOC documented, pt is DNR DNI  will follow  prognosis guarded to poor.
no EGD planned - as per GI notes  ID follow up reviewed  on ABX - sepsis eval -   wbc trending down  Dementia - assist with ADL - supportive medical regimen and care  oral hygiene and skin care  labs and imaging reviewed  pt with occasional agitation -   DNR DNI  eventual DC to SNF  will follow
PRBC transfusion PRN  serial CBC  GIB - ? upper

## 2020-01-03 NOTE — PROGRESS NOTE ADULT - PROVIDER SPECIALTY LIST ADULT
Family Medicine
Gastroenterology
Infectious Disease
Nephrology
Palliative Care
Gastroenterology

## 2020-01-17 ENCOUNTER — OUTPATIENT (OUTPATIENT)
Dept: OUTPATIENT SERVICES | Facility: HOSPITAL | Age: 85
LOS: 1 days | End: 2020-01-17
Payer: MEDICARE

## 2020-01-17 VITALS
TEMPERATURE: 97 F | HEART RATE: 63 BPM | WEIGHT: 160.06 LBS | OXYGEN SATURATION: 99 % | HEIGHT: 64 IN | DIASTOLIC BLOOD PRESSURE: 61 MMHG | RESPIRATION RATE: 16 BRPM | SYSTOLIC BLOOD PRESSURE: 135 MMHG

## 2020-01-17 VITALS
SYSTOLIC BLOOD PRESSURE: 138 MMHG | DIASTOLIC BLOOD PRESSURE: 55 MMHG | TEMPERATURE: 98 F | HEART RATE: 63 BPM | OXYGEN SATURATION: 96 % | RESPIRATION RATE: 16 BRPM

## 2020-01-17 DIAGNOSIS — Z95.0 PRESENCE OF CARDIAC PACEMAKER: Chronic | ICD-10-CM

## 2020-01-17 DIAGNOSIS — E83.111 HEMOCHROMATOSIS DUE TO REPEATED RED BLOOD CELL TRANSFUSIONS: ICD-10-CM

## 2020-01-17 LAB
BLD GP AB SCN SERPL QL: SIGNIFICANT CHANGE UP
HCT VFR BLD CALC: 24.2 % — LOW (ref 34.5–45)
HGB BLD-MCNC: 7.2 G/DL — LOW (ref 11.5–15.5)
MCHC RBC-ENTMCNC: 29.8 GM/DL — LOW (ref 32–36)
MCHC RBC-ENTMCNC: 30 PG — SIGNIFICANT CHANGE UP (ref 27–34)
MCV RBC AUTO: 100.8 FL — HIGH (ref 80–100)
NRBC # BLD: 0 /100 WBCS — SIGNIFICANT CHANGE UP (ref 0–0)
PLATELET # BLD AUTO: 271 K/UL — SIGNIFICANT CHANGE UP (ref 150–400)
RBC # BLD: 2.4 M/UL — LOW (ref 3.8–5.2)
RBC # FLD: 18.1 % — HIGH (ref 10.3–14.5)
WBC # BLD: 5.54 K/UL — SIGNIFICANT CHANGE UP (ref 3.8–10.5)
WBC # FLD AUTO: 5.54 K/UL — SIGNIFICANT CHANGE UP (ref 3.8–10.5)

## 2020-01-17 PROCEDURE — 36430 TRANSFUSION BLD/BLD COMPNT: CPT

## 2020-01-17 PROCEDURE — 86900 BLOOD TYPING SEROLOGIC ABO: CPT

## 2020-01-17 PROCEDURE — 86923 COMPATIBILITY TEST ELECTRIC: CPT

## 2020-01-17 PROCEDURE — 36415 COLL VENOUS BLD VENIPUNCTURE: CPT

## 2020-01-17 PROCEDURE — 86850 RBC ANTIBODY SCREEN: CPT

## 2020-01-17 PROCEDURE — 86901 BLOOD TYPING SEROLOGIC RH(D): CPT

## 2020-01-17 PROCEDURE — P9016: CPT

## 2020-01-17 PROCEDURE — 85027 COMPLETE CBC AUTOMATED: CPT

## 2020-01-17 RX ORDER — ACETAMINOPHEN 500 MG
650 TABLET ORAL ONCE
Refills: 0 | Status: COMPLETED | OUTPATIENT
Start: 2020-01-17 | End: 2020-01-17

## 2020-01-17 RX ORDER — CEFPODOXIME PROXETIL 100 MG
1 TABLET ORAL
Qty: 0 | Refills: 0 | DISCHARGE

## 2020-01-17 RX ADMIN — Medication 650 MILLIGRAM(S): at 10:57

## 2020-03-31 NOTE — PHYSICAL THERAPY INITIAL EVALUATION ADULT - GENERAL OBSERVATIONS, REHAB EVAL
Patient seen today for 1st prenatal visit  She denies any current complaints  1  11 2 weeks-twin gestation secondary to IVF with embryo transfer 20 given BILL of 10/18/20  She had flu shot in the fall  Pap with HPV and GC/chlamydia done today  CF and SMA testing were negative  Patient has follow-up with MFM in 1-2 weeks time for nuchal translucency ultrasound and genetic counseling  To follow-up in 5 weeks time for virtual prenatal visit  2  Twin gestation-mono/DI pregnancy from single embryo transfer on 20  3  Previous   -primary low-transverse  done for persistent category 2 tracing with arrest of dilation 
Pt rec'd in bed, NAD noted

## 2020-05-07 ENCOUNTER — INPATIENT (INPATIENT)
Facility: HOSPITAL | Age: 85
LOS: 3 days | Discharge: HOSPICE | DRG: 951 | End: 2020-05-11
Attending: FAMILY MEDICINE | Admitting: FAMILY MEDICINE
Payer: MEDICARE

## 2020-05-07 ENCOUNTER — EMERGENCY (EMERGENCY)
Facility: HOSPITAL | Age: 85
LOS: 1 days | Discharge: ANOTHER HOSPITAL | End: 2020-05-07
Attending: EMERGENCY MEDICINE | Admitting: EMERGENCY MEDICINE
Payer: MEDICARE

## 2020-05-07 VITALS
OXYGEN SATURATION: 90 % | RESPIRATION RATE: 28 BRPM | TEMPERATURE: 95 F | HEART RATE: 64 BPM | WEIGHT: 130.07 LBS | SYSTOLIC BLOOD PRESSURE: 90 MMHG | HEIGHT: 63 IN | DIASTOLIC BLOOD PRESSURE: 43 MMHG

## 2020-05-07 VITALS
SYSTOLIC BLOOD PRESSURE: 108 MMHG | RESPIRATION RATE: 18 BRPM | DIASTOLIC BLOOD PRESSURE: 45 MMHG | HEART RATE: 70 BPM | OXYGEN SATURATION: 95 %

## 2020-05-07 VITALS
SYSTOLIC BLOOD PRESSURE: 123 MMHG | DIASTOLIC BLOOD PRESSURE: 53 MMHG | HEIGHT: 63 IN | HEART RATE: 98 BPM | OXYGEN SATURATION: 98 % | RESPIRATION RATE: 12 BRPM

## 2020-05-07 DIAGNOSIS — Z95.0 PRESENCE OF CARDIAC PACEMAKER: Chronic | ICD-10-CM

## 2020-05-07 DIAGNOSIS — U07.1 COVID-19: ICD-10-CM

## 2020-05-07 LAB
ALBUMIN SERPL ELPH-MCNC: 1.7 G/DL — LOW (ref 3.3–5)
ALP SERPL-CCNC: 138 U/L — HIGH (ref 30–120)
ALT FLD-CCNC: 17 U/L DA — SIGNIFICANT CHANGE UP (ref 10–60)
ANION GAP SERPL CALC-SCNC: 19 MMOL/L — HIGH (ref 5–17)
APPEARANCE UR: CLEAR — SIGNIFICANT CHANGE UP
AST SERPL-CCNC: 33 U/L — SIGNIFICANT CHANGE UP (ref 10–40)
BACTERIA # UR AUTO: ABNORMAL
BASOPHILS # BLD AUTO: 0 K/UL — SIGNIFICANT CHANGE UP (ref 0–0.2)
BASOPHILS NFR BLD AUTO: 0 % — SIGNIFICANT CHANGE UP (ref 0–2)
BILIRUB SERPL-MCNC: 0.3 MG/DL — SIGNIFICANT CHANGE UP (ref 0.2–1.2)
BILIRUB UR-MCNC: NEGATIVE — SIGNIFICANT CHANGE UP
BLD GP AB SCN SERPL QL: SIGNIFICANT CHANGE UP
BUN SERPL-MCNC: 104 MG/DL — HIGH (ref 7–23)
CALCIUM SERPL-MCNC: 8.6 MG/DL — SIGNIFICANT CHANGE UP (ref 8.4–10.5)
CHLORIDE SERPL-SCNC: 126 MMOL/L — HIGH (ref 96–108)
CO2 SERPL-SCNC: 18 MMOL/L — LOW (ref 22–31)
COLOR SPEC: YELLOW — SIGNIFICANT CHANGE UP
CREAT SERPL-MCNC: 6.06 MG/DL — HIGH (ref 0.5–1.3)
DIFF PNL FLD: NEGATIVE — SIGNIFICANT CHANGE UP
EOSINOPHIL # BLD AUTO: 0.73 K/UL — HIGH (ref 0–0.5)
EOSINOPHIL NFR BLD AUTO: 4 % — SIGNIFICANT CHANGE UP (ref 0–6)
EPI CELLS # UR: SIGNIFICANT CHANGE UP
GLUCOSE SERPL-MCNC: 127 MG/DL — HIGH (ref 70–99)
GLUCOSE UR QL: NEGATIVE MG/DL — SIGNIFICANT CHANGE UP
HCT VFR BLD CALC: 39.3 % — SIGNIFICANT CHANGE UP (ref 34.5–45)
HGB BLD-MCNC: 11.9 G/DL — SIGNIFICANT CHANGE UP (ref 11.5–15.5)
KETONES UR-MCNC: NEGATIVE — SIGNIFICANT CHANGE UP
LACTATE SERPL-SCNC: 2.6 MMOL/L — HIGH (ref 0.7–2)
LACTATE SERPL-SCNC: 2.8 MMOL/L — HIGH (ref 0.7–2)
LEUKOCYTE ESTERASE UR-ACNC: NEGATIVE — SIGNIFICANT CHANGE UP
LYMPHOCYTES # BLD AUTO: 1.1 K/UL — SIGNIFICANT CHANGE UP (ref 1–3.3)
LYMPHOCYTES # BLD AUTO: 6 % — LOW (ref 13–44)
MANUAL SMEAR VERIFICATION: SIGNIFICANT CHANGE UP
MCHC RBC-ENTMCNC: 27.7 PG — SIGNIFICANT CHANGE UP (ref 27–34)
MCHC RBC-ENTMCNC: 30.3 GM/DL — LOW (ref 32–36)
MCV RBC AUTO: 91.6 FL — SIGNIFICANT CHANGE UP (ref 80–100)
MONOCYTES # BLD AUTO: 0.37 K/UL — SIGNIFICANT CHANGE UP (ref 0–0.9)
MONOCYTES NFR BLD AUTO: 2 % — SIGNIFICANT CHANGE UP (ref 2–14)
NEUTROPHILS # BLD AUTO: 16.17 K/UL — HIGH (ref 1.8–7.4)
NEUTROPHILS NFR BLD AUTO: 73 % — SIGNIFICANT CHANGE UP (ref 43–77)
NEUTS BAND # BLD: 15 % — HIGH (ref 0–8)
NITRITE UR-MCNC: NEGATIVE — SIGNIFICANT CHANGE UP
NRBC # BLD: 0 — SIGNIFICANT CHANGE UP
NRBC # BLD: SIGNIFICANT CHANGE UP /100 WBCS (ref 0–0)
PH UR: 5 — SIGNIFICANT CHANGE UP (ref 5–8)
PLAT MORPH BLD: NORMAL — SIGNIFICANT CHANGE UP
PLATELET # BLD AUTO: 241 K/UL — SIGNIFICANT CHANGE UP (ref 150–400)
POTASSIUM SERPL-MCNC: 3.1 MMOL/L — LOW (ref 3.5–5.3)
POTASSIUM SERPL-SCNC: 3.1 MMOL/L — LOW (ref 3.5–5.3)
PROT SERPL-MCNC: 7 G/DL — SIGNIFICANT CHANGE UP (ref 6–8.3)
PROT UR-MCNC: 30 MG/DL
RBC # BLD: 4.29 M/UL — SIGNIFICANT CHANGE UP (ref 3.8–5.2)
RBC # FLD: 23.2 % — HIGH (ref 10.3–14.5)
RBC BLD AUTO: NORMAL — SIGNIFICANT CHANGE UP
SARS-COV-2 RNA SPEC QL NAA+PROBE: DETECTED
SODIUM SERPL-SCNC: 163 MMOL/L — CRITICAL HIGH (ref 135–145)
SP GR SPEC: 1.02 — SIGNIFICANT CHANGE UP (ref 1.01–1.02)
TOXIC GRANULES BLD QL SMEAR: PRESENT — SIGNIFICANT CHANGE UP
UROBILINOGEN FLD QL: NEGATIVE MG/DL — SIGNIFICANT CHANGE UP
WBC # BLD: 18.37 K/UL — HIGH (ref 3.8–10.5)
WBC # FLD AUTO: 18.37 K/UL — HIGH (ref 3.8–10.5)
WBC UR QL: SIGNIFICANT CHANGE UP

## 2020-05-07 PROCEDURE — 70450 CT HEAD/BRAIN W/O DYE: CPT | Mod: 26

## 2020-05-07 PROCEDURE — 99285 EMERGENCY DEPT VISIT HI MDM: CPT | Mod: 25

## 2020-05-07 PROCEDURE — 70450 CT HEAD/BRAIN W/O DYE: CPT

## 2020-05-07 PROCEDURE — 87635 SARS-COV-2 COVID-19 AMP PRB: CPT

## 2020-05-07 PROCEDURE — 12011 RPR F/E/E/N/L/M 2.5 CM/<: CPT

## 2020-05-07 PROCEDURE — 99285 EMERGENCY DEPT VISIT HI MDM: CPT | Mod: CS,25

## 2020-05-07 PROCEDURE — 93005 ELECTROCARDIOGRAM TRACING: CPT

## 2020-05-07 PROCEDURE — 81001 URINALYSIS AUTO W/SCOPE: CPT

## 2020-05-07 PROCEDURE — 99284 EMERGENCY DEPT VISIT MOD MDM: CPT | Mod: CS

## 2020-05-07 PROCEDURE — 85027 COMPLETE CBC AUTOMATED: CPT

## 2020-05-07 PROCEDURE — 36415 COLL VENOUS BLD VENIPUNCTURE: CPT

## 2020-05-07 PROCEDURE — 86901 BLOOD TYPING SEROLOGIC RH(D): CPT

## 2020-05-07 PROCEDURE — 71045 X-RAY EXAM CHEST 1 VIEW: CPT

## 2020-05-07 PROCEDURE — 93010 ELECTROCARDIOGRAM REPORT: CPT

## 2020-05-07 PROCEDURE — 96367 TX/PROPH/DG ADDL SEQ IV INF: CPT | Mod: XU

## 2020-05-07 PROCEDURE — 86900 BLOOD TYPING SEROLOGIC ABO: CPT

## 2020-05-07 PROCEDURE — 87181 SC STD AGAR DILUTION PER AGT: CPT

## 2020-05-07 PROCEDURE — 87086 URINE CULTURE/COLONY COUNT: CPT

## 2020-05-07 PROCEDURE — 86850 RBC ANTIBODY SCREEN: CPT

## 2020-05-07 PROCEDURE — 87150 DNA/RNA AMPLIFIED PROBE: CPT

## 2020-05-07 PROCEDURE — 12011 RPR F/E/E/N/L/M 2.5 CM/<: CPT | Mod: CS

## 2020-05-07 PROCEDURE — 83605 ASSAY OF LACTIC ACID: CPT

## 2020-05-07 PROCEDURE — 71045 X-RAY EXAM CHEST 1 VIEW: CPT | Mod: 26

## 2020-05-07 PROCEDURE — 87040 BLOOD CULTURE FOR BACTERIA: CPT

## 2020-05-07 PROCEDURE — 80053 COMPREHEN METABOLIC PANEL: CPT

## 2020-05-07 PROCEDURE — 96365 THER/PROPH/DIAG IV INF INIT: CPT | Mod: XU

## 2020-05-07 RX ORDER — SODIUM CHLORIDE 9 MG/ML
500 INJECTION, SOLUTION INTRAVENOUS
Refills: 0 | Status: DISCONTINUED | OUTPATIENT
Start: 2020-05-07 | End: 2020-05-11

## 2020-05-07 RX ORDER — ATROPINE SULFATE 1 %
3 DROPS OPHTHALMIC (EYE) EVERY 4 HOURS
Refills: 0 | Status: DISCONTINUED | OUTPATIENT
Start: 2020-05-07 | End: 2020-05-11

## 2020-05-07 RX ORDER — ACETAMINOPHEN 500 MG
650 TABLET ORAL EVERY 6 HOURS
Refills: 0 | Status: DISCONTINUED | OUTPATIENT
Start: 2020-05-07 | End: 2020-05-08

## 2020-05-07 RX ORDER — LEVOTHYROXINE SODIUM 125 MCG
137 TABLET ORAL DAILY
Refills: 0 | Status: DISCONTINUED | OUTPATIENT
Start: 2020-05-07 | End: 2020-05-07

## 2020-05-07 RX ORDER — SODIUM CHLORIDE 9 MG/ML
500 INJECTION INTRAMUSCULAR; INTRAVENOUS; SUBCUTANEOUS ONCE
Refills: 0 | Status: COMPLETED | OUTPATIENT
Start: 2020-05-07 | End: 2020-05-07

## 2020-05-07 RX ORDER — ALLOPURINOL 300 MG
100 TABLET ORAL DAILY
Refills: 0 | Status: DISCONTINUED | OUTPATIENT
Start: 2020-05-07 | End: 2020-05-07

## 2020-05-07 RX ORDER — AMLODIPINE BESYLATE 2.5 MG/1
10 TABLET ORAL DAILY
Refills: 0 | Status: DISCONTINUED | OUTPATIENT
Start: 2020-05-07 | End: 2020-05-07

## 2020-05-07 RX ORDER — MULTIVIT-MIN/FERROUS GLUCONATE 9 MG/15 ML
1 LIQUID (ML) ORAL DAILY
Refills: 0 | Status: DISCONTINUED | OUTPATIENT
Start: 2020-05-07 | End: 2020-05-07

## 2020-05-07 RX ORDER — ASPIRIN/CALCIUM CARB/MAGNESIUM 324 MG
81 TABLET ORAL DAILY
Refills: 0 | Status: DISCONTINUED | OUTPATIENT
Start: 2020-05-07 | End: 2020-05-07

## 2020-05-07 RX ORDER — MORPHINE SULFATE 50 MG/1
2 CAPSULE, EXTENDED RELEASE ORAL ONCE
Refills: 0 | Status: DISCONTINUED | OUTPATIENT
Start: 2020-05-07 | End: 2020-05-07

## 2020-05-07 RX ORDER — MAGNESIUM HYDROXIDE 400 MG/1
30 TABLET, CHEWABLE ORAL DAILY
Refills: 0 | Status: DISCONTINUED | OUTPATIENT
Start: 2020-05-07 | End: 2020-05-08

## 2020-05-07 RX ORDER — FAMOTIDINE 10 MG/ML
20 INJECTION INTRAVENOUS DAILY
Refills: 0 | Status: DISCONTINUED | OUTPATIENT
Start: 2020-05-07 | End: 2020-05-07

## 2020-05-07 RX ORDER — PIPERACILLIN AND TAZOBACTAM 4; .5 G/20ML; G/20ML
3.38 INJECTION, POWDER, LYOPHILIZED, FOR SOLUTION INTRAVENOUS ONCE
Refills: 0 | Status: COMPLETED | OUTPATIENT
Start: 2020-05-07 | End: 2020-05-07

## 2020-05-07 RX ORDER — FERROUS SULFATE 325(65) MG
325 TABLET ORAL DAILY
Refills: 0 | Status: DISCONTINUED | OUTPATIENT
Start: 2020-05-07 | End: 2020-05-07

## 2020-05-07 RX ORDER — HYDROMORPHONE HYDROCHLORIDE 2 MG/ML
1 INJECTION INTRAMUSCULAR; INTRAVENOUS; SUBCUTANEOUS
Refills: 0 | Status: DISCONTINUED | OUTPATIENT
Start: 2020-05-07 | End: 2020-05-11

## 2020-05-07 RX ORDER — SODIUM CHLORIDE 9 MG/ML
1000 INJECTION, SOLUTION INTRAVENOUS
Refills: 0 | Status: DISCONTINUED | OUTPATIENT
Start: 2020-05-07 | End: 2020-05-11

## 2020-05-07 RX ORDER — SENNA PLUS 8.6 MG/1
2 TABLET ORAL AT BEDTIME
Refills: 0 | Status: DISCONTINUED | OUTPATIENT
Start: 2020-05-07 | End: 2020-05-08

## 2020-05-07 RX ORDER — VANCOMYCIN HCL 1 G
1000 VIAL (EA) INTRAVENOUS ONCE
Refills: 0 | Status: COMPLETED | OUTPATIENT
Start: 2020-05-07 | End: 2020-05-07

## 2020-05-07 RX ORDER — ACETAMINOPHEN 500 MG
2 TABLET ORAL
Qty: 0 | Refills: 0 | DISCHARGE

## 2020-05-07 RX ORDER — ASCORBIC ACID 60 MG
500 TABLET,CHEWABLE ORAL DAILY
Refills: 0 | Status: DISCONTINUED | OUTPATIENT
Start: 2020-05-07 | End: 2020-05-07

## 2020-05-07 RX ORDER — ATORVASTATIN CALCIUM 80 MG/1
20 TABLET, FILM COATED ORAL AT BEDTIME
Refills: 0 | Status: DISCONTINUED | OUTPATIENT
Start: 2020-05-07 | End: 2020-05-07

## 2020-05-07 RX ADMIN — SODIUM CHLORIDE 80 MILLILITER(S): 9 INJECTION, SOLUTION INTRAVENOUS at 15:54

## 2020-05-07 RX ADMIN — PIPERACILLIN AND TAZOBACTAM 200 GRAM(S): 4; .5 INJECTION, POWDER, LYOPHILIZED, FOR SOLUTION INTRAVENOUS at 11:18

## 2020-05-07 RX ADMIN — SODIUM CHLORIDE 500 MILLILITER(S): 9 INJECTION, SOLUTION INTRAVENOUS at 11:45

## 2020-05-07 RX ADMIN — PIPERACILLIN AND TAZOBACTAM 3.38 GRAM(S): 4; .5 INJECTION, POWDER, LYOPHILIZED, FOR SOLUTION INTRAVENOUS at 11:48

## 2020-05-07 RX ADMIN — SODIUM CHLORIDE 500 MILLILITER(S): 9 INJECTION INTRAMUSCULAR; INTRAVENOUS; SUBCUTANEOUS at 14:00

## 2020-05-07 RX ADMIN — Medication 250 MILLIGRAM(S): at 12:12

## 2020-05-07 RX ADMIN — SODIUM CHLORIDE 100 MILLILITER(S): 9 INJECTION, SOLUTION INTRAVENOUS at 11:17

## 2020-05-07 RX ADMIN — MORPHINE SULFATE 2 MILLIGRAM(S): 50 CAPSULE, EXTENDED RELEASE ORAL at 13:50

## 2020-05-07 RX ADMIN — Medication 1000 MILLIGRAM(S): at 13:12

## 2020-05-07 NOTE — ED ADULT NURSE NOTE - CHIEF COMPLAINT QUOTE
pt transferred from [facility] for admission, pt dx with covid, s/p fall, subarachnoid hematoma, hypotension, hypothermia, left pleural effusion, DNR/DNI and family declined transfer to [place] for neurosurgery,

## 2020-05-07 NOTE — ED ADULT NURSE NOTE - OBJECTIVE STATEMENT
Received the patient in the Er. Patient is agitated, moving around. Patient is confused. Patient is a transfer from [place] for admission. S/P fall today. Abrasion and hematoma  noticed on left side forehead. Rectal Temp is 94.6. Bear hugger applied. Able to move all extremities.

## 2020-05-07 NOTE — ED PROVIDER NOTE - PROGRESS NOTE DETAILS
discussed case with Dr. Allen and Dr. Villanueva will admit at [facility], Dr. Arrieta from [facility] made aware of case and accepting if CT head negative discussed case with Dr. Allen and Dr. Villanueva will admit at [facility], Dr. Arrieta from [facility] made aware of case and accepting discussed CT results with patient son who is hcp, does not want any surgical intervention, reiterated patient is dnr/dni, offered [place] transfer, son wants patient transferred to [facility] discussed CT results with Dr. Villanueva and Dr. Arrieta, will transfer to [facility] spoke with Dr. Fine, will see patient at [place]

## 2020-05-07 NOTE — ED PROVIDER NOTE - CLINICAL SUMMARY MEDICAL DECISION MAKING FREE TEXT BOX
89F hx dementia, sp unwitnessed fall from LTC, transferred from [facility] ED with SAH, COVID+ pneumonia, given abx and IVF at [facility], DNR/DNI, transferred for admission.  Pt in NAD, PERRL, moving all extremities, nonverbal.  Hypothermic, otherwise stable VS.  Joon Hugger in place, IVF and abx running, neurology recommeding no acute interventions, TBA with palliative care consult.

## 2020-05-07 NOTE — CONSULT NOTE ADULT - ASSESSMENT
1.	TALON on CKD 3: Prerenal azotemia, ? ATN, R/o retention  2.	Hypokalemia  3.	Hypernatremia  4.	COVID +  5.	s/p Fall  6.	Hyperchloremic acidosis    IV hydration. Monitor sodium trend on half NS. Kumar catheter to r/o retention and to monitor urine out put. Potassium supps.   COVID precautions. Supportive care. Overall prognosis poor. Avoid nephrotoxic meds as possible. Avoid ACEI, ARB, NSAIDs and IV contrast.   Will follow electrolytes and renal function trend. Further recommendations pending clinical course. Thank you for the courtesy of this referral.

## 2020-05-07 NOTE — ED PROVIDER NOTE - SECONDARY DIAGNOSIS.
SAH (subarachnoid hemorrhage) Pneumonia of right lung due to infectious organism, unspecified part of lung

## 2020-05-07 NOTE — ED PROVIDER NOTE - OBJECTIVE STATEMENT
89 yo female dementia, anemia, s/p fall as per EMS with forehead laceration/hematoma, sent form excel for evaluation. Pt poor historian secondary to dementia. 89 yo female dementia, anemia, s/p fall as per EMS with forehead laceration/hematoma, sent from [place] for evaluation. Pt poor historian secondary to dementia.  Has MOLST is DNR/DNI 89 yo female dementia, anemia, s/p fall as per EMS with forehead laceration/hematoma, sent from [place] for evaluation. Pt poor historian secondary to dementia.  Has MOLST is DNR/DNI. Rectal temp 95, as per ems was hypotensive to 80's systolic 89 yo female dementia, anemia, s/p fall as per EMS with forehead laceration/hematoma, sent from [place] for evaluation. Pt poor historian secondary to dementia.  Has MOLST is DNR/DNI. Rectal temp 95, as per ems was hypotensive to 80's systolic    son [place]/[facility], [phone]

## 2020-05-07 NOTE — CONSULT NOTE ADULT - SUBJECTIVE AND OBJECTIVE BOX
Patient is a 89y old  Female who presents with a chief complaint of fall.     HPI:  89F transferred from [place] ED for admission s/p unwitnessed fall, ICH, COVID+, from nursing home, DNR/DNI.  Per [facility] ED note, pt family declined  transfer to [place] for NSx intervention.  Pt given vanco/zosyn, fluids prior to arrival to ED.    Renal consult called for TALON. History obtained from chart.       PAST MEDICAL HISTORY:  Anxiety  GERD (gastroesophageal reflux disease)  Retention of urine  Agitation  Neck pain  Hyperlipidemia, unspecified hyperlipidemia type  Essential hypertension  Chronic back pain  CAD (coronary artery disease)  Cataract  Hypothyroid  DVT (deep venous thrombosis), right      PAST SURGICAL HISTORY:  Pacemaker  S/P coronary artery bypass graft x 1      FAMILY HISTORY:      SOCIAL HISTORY: No smoking or alcohol use     Allergies    No Known Allergies    Intolerances      Home Medications:  acetaminophen 325 mg oral tablet: 2 tab(s) orally every 6 hours, As Needed (07 May 2020 09:46)  allopurinol 100 mg oral tablet: 1 tab(s) orally once a day (07 May 2020 09:46)  amLODIPine 10 mg oral tablet: 1 tab(s) orally once a day (07 May 2020 09:46)  ascorbic acid 500 mg oral tablet: 1 tab(s) orally once a day (07 May 2020 09:46)  aspirin 81 mg oral tablet, chewable: 1 tab(s) orally once a day (07 May 2020 09:46)  atorvastatin 20 mg oral tablet: 1 tab(s) orally once a day (at bedtime) (07 May 2020 09:46)  Colace 100 mg oral capsule: 3 cap(s) orally once a day (at bedtime) (07 May 2020 09:46)  Feosol 325 mg (65 mg elemental iron) oral tablet: 1 tab(s) orally once a day (07 May 2020 09:46)  Teresa-alyssa 8.6 mg oral tablet: 2 tab(s) orally once a day (at bedtime) (07 May 2020 09:46)  lactobacillus acidophilus oral capsule: orally once a day (07 May 2020 09:46)  Levoxyl 137 mcg (0.137 mg) oral tablet: 1 tab(s) orally once a day (07 May 2020 09:46)  Multiple Vitamins with Minerals oral tablet: 1 tab(s) orally once a day (07 May 2020 09:46)  pantoprazole 40 mg oral delayed release tablet: 1 tab(s) orally once a day (before a meal) (07 May 2020 09:46)  Zantac 150 oral tablet: 2 tab(s) orally once a day (at bedtime) (07 May 2020 09:46)    MEDICATIONS  (STANDING):  allopurinol 100 milliGRAM(s) Oral daily  amLODIPine   Tablet 10 milliGRAM(s) Oral daily  ascorbic acid 500 milliGRAM(s) Oral daily  aspirin  chewable 81 milliGRAM(s) Oral daily  atorvastatin 20 milliGRAM(s) Oral at bedtime  famotidine    Tablet 20 milliGRAM(s) Oral daily  ferrous    sulfate 325 milliGRAM(s) Oral daily  levothyroxine 137 MICROGram(s) Oral daily  multivitamin/minerals 1 Tablet(s) Oral daily  senna 2 Tablet(s) Oral at bedtime    MEDICATIONS  (PRN):  acetaminophen   Tablet .. 650 milliGRAM(s) Oral every 6 hours PRN Temp greater or equal to 38C (100.4F)      REVIEW OF SYSTEMS:  Pt on COVID precautions. Chart reviewed.     T(F): 94.6 (20 @ 14:00), Max: 95.4 (20 @ 09:29)  HR: 88 (20 @ 14:00) (64 - 98)  BP: 130/60 (20 @ 14:00) (90/43 - 130/60)  RR: 18 (20 @ 14:00) (12 - 28)  SpO2: 100% (20 @ 14:00) (90% - 100%)  Wt(kg): --    PHYSICAL EXAM:  Pt resting in bed.   Pt on COVID precautions. Chart reviewed and previous physical examination noted.           163<HH>  |  126<H>  |  104<H>  ----------------------------<  127<H>  3.1<L>   |  18<L>  |  6.06<H>    Ca    8.6      07 May 2020 10:09    TPro  7.0  /  Alb  1.7<L>  /  TBili  0.3  /  DBili  x   /  AST  33  /  ALT  17  /  AlkPhos  138<H>                            11.9   18.37 )-----------( 241      ( 07 May 2020 10:09 )             39.3       Hematocrit: 39.3 % ( @ 10:09)  Hemoglobin: 11.9 g/dL ( @ 10:09)  Blood Urea Nitrogen, Serum: 104 mg/dL ( @ 10:09)  Calcium, Total Serum: 8.6 mg/dL ( @ 10:09)      Creatinine, Serum: 6.06 ( @ 10:09)  Creatinine, Serum: 1.50 mg/dL (20 @ 08:53)    Urinalysis Basic - ( 07 May 2020 11:24 )    Color: Yellow / Appearance: Clear / S.020 / pH: x  Gluc: x / Ketone: Negative  / Bili: Negative / Urobili: Negative mg/dL   Blood: x / Protein: 30 mg/dL / Nitrite: Negative   Leuk Esterase: Negative / RBC: x / WBC 0-2   Sq Epi: x / Non Sq Epi: Occasional / Bacteria: Few      LIVER FUNCTIONS - ( 07 May 2020 10:09 )  Alb: 1.7 g/dL / Pro: 7.0 g/dL / ALK PHOS: 138 U/L / ALT: 17 U/L DA / AST: 33 U/L / GGT: x             < from: Xray Chest 1 View AP/PA (20 @ 11:14) >    EXAM:  XR CHEST AP OR PA 1V                                  PROCEDURE DATE:  2020          INTERPRETATION:    CLINICAL STATEMENT: Follow-up chest pain.    TECHNIQUE: AP view of the chest.    COMPARISON: 2019    FINDINGS/  IMPRESSION:  Left cardiac device, sternotomy wires again noted.    New bilateral airspace opacities right greater than left.    Trace left pleural effusion. Biapical pleural thickening.    Heart size cannot be accurately assessed in this projection.      ERIN ARGUETA M.D., ATTENDING RADIOLOGIST  This document has been electronically signed. May  7 2020 11:27AM                < end of copied text >

## 2020-05-07 NOTE — H&P ADULT - NSHPLABSRESULTS_GEN_ALL_CORE
163<HH>  |  126<H>  |  104<H>  ----------------------------<  127<H>  3.1<L>   |  18<L>  |  6.06<H>    Ca    8.6      07 May 2020 10:09    TPro  7.0  /  Alb  1.7<L>  /  TBili  0.3  /  DBili  x   /  AST  33  /  ALT  17  /  AlkPhos  138<H>                              11.9   18.37 )-----------( 241      ( 07 May 2020 10:09 )             39.3             LIVER FUNCTIONS - ( 07 May 2020 10:09 )  Alb: 1.7 g/dL / Pro: 7.0 g/dL / ALK PHOS: 138 U/L / ALT: 17 U/L DA / AST: 33 U/L / GGT: x                 Urinalysis Basic - ( 07 May 2020 11:24 )    Color: Yellow / Appearance: Clear / S.020 / pH: x  Gluc: x / Ketone: Negative  / Bili: Negative / Urobili: Negative mg/dL   Blood: x / Protein: 30 mg/dL / Nitrite: Negative   Leuk Esterase: Negative / RBC: x / WBC 0-2   Sq Epi: x / Non Sq Epi: Occasional / Bacteria: Few

## 2020-05-07 NOTE — ED PROVIDER NOTE - PROGRESS NOTE DETAILS
Pt VSS, HR 62, O2 100 on RA, spoke with son [place] to discuss status and care. Spoke with Lou at [facility], discussed COVID positive result.  Lou reported pt was tested there and found to be negative 2 weeks ago.

## 2020-05-07 NOTE — ED PROVIDER NOTE - CARE PLAN
Principal Discharge DX:	COVID-19 virus detected  Secondary Diagnosis:	SAH (subarachnoid hemorrhage)  Secondary Diagnosis:	Pneumonia of right lung due to infectious organism, unspecified part of lung

## 2020-05-07 NOTE — ED ADULT NURSE NOTE - NSIMPLEMENTINTERV_GEN_ALL_ED
Implemented All Universal Safety Interventions:  [place] to call system. Call bell, personal items and telephone within reach. Instruct patient to call for assistance. Room bathroom lighting operational. Non-slip footwear when patient is off stretcher. Physically safe environment: no spills, clutter or unnecessary equipment. Stretcher in lowest position, wheels locked, appropriate side rails in place.

## 2020-05-07 NOTE — CONSULT NOTE ADULT - SUBJECTIVE AND OBJECTIVE BOX
Date/Time Patient Seen:  		  Referring MD:   Data Reviewed	       Patient is a 89y old  Female who presents with a chief complaint of     Subjective/HPI    in bed    h and p reviewed  labs reviewed  spoke with dtr and son    transferred from NH      HPI:  89F transferred from [place] ED for admission s/p unwitnessed fall, ICH, COVID+, from nursing home, DNR/DNI.  Per [facility] ED note, pt family declined  transfer to [place] for NSx intervention.  Pt given vanco/zosyn, fluids prior to arrival to ED.    non smoker  non drinker  retired    family hx - ashd -        PAST MEDICAL & SURGICAL HISTORY:  Anxiety  GERD (gastroesophageal reflux disease)  Retention of urine  Agitation  Neck pain  Hyperlipidemia, unspecified hyperlipidemia type  Essential hypertension  Chronic back pain  CAD (coronary artery disease)  Cataract: right eye  Hypothyroid  DVT (deep venous thrombosis), right  Pacemaker  S/P coronary artery bypass graft x 1        Medication list         MEDICATIONS  (STANDING):  allopurinol 100 milliGRAM(s) Oral daily  amLODIPine   Tablet 10 milliGRAM(s) Oral daily  ascorbic acid 500 milliGRAM(s) Oral daily  aspirin  chewable 81 milliGRAM(s) Oral daily  atorvastatin 20 milliGRAM(s) Oral at bedtime  famotidine    Tablet 20 milliGRAM(s) Oral daily  ferrous    sulfate 325 milliGRAM(s) Oral daily  levothyroxine 137 MICROGram(s) Oral daily  multivitamin/minerals 1 Tablet(s) Oral daily  senna 2 Tablet(s) Oral at bedtime  sodium chloride 0.45%. 1000 milliLiter(s) (80 mL/Hr) IV Continuous <Continuous>    MEDICATIONS  (PRN):  acetaminophen   Tablet .. 650 milliGRAM(s) Oral every 6 hours PRN Temp greater or equal to 38C (100.4F)         Vitals log        ICU Vital Signs Last 24 Hrs  T(C): 34.8 (07 May 2020 14:00), Max: 35.2 (07 May 2020 09:29)  T(F): 94.6 (07 May 2020 14:00), Max: 95.4 (07 May 2020 09:29)  HR: 88 (07 May 2020 14:00) (64 - 98)  BP: 130/60 (07 May 2020 14:00) (90/43 - 130/60)  BP(mean): --  ABP: --  ABP(mean): --  RR: 18 (07 May 2020 14:00) (12 - 28)  SpO2: 100% (07 May 2020 14:00) (90% - 100%)           Input and Output:  I&O's Detail      Lab Data                        11.9   18.37 )-----------( 241      ( 07 May 2020 10:09 )             39.3     05-07    163<HH>  |  126<H>  |  104<H>  ----------------------------<  127<H>  3.1<L>   |  18<L>  |  6.06<H>    Ca    8.6      07 May 2020 10:09    TPro  7.0  /  Alb  1.7<L>  /  TBili  0.3  /  DBili  x   /  AST  33  /  ALT  17  /  AlkPhos  138<H>  05-07            Review of Systems	    frail    Objective     Physical Examination    heart a1a2  lung dec bs    Pertinent Lab findings & Imaging      Kumar:  NO   Adequate UO     I&O's Detail           Discussed with:     Cultures:	        Radiology          EXAM:  CT BRAIN                                  PROCEDURE DATE:  05/07/2020          INTERPRETATION:  Exam Date: 5/7/2020 11:14 AM    CT head without IV contrast    CLINICAL INFORMATION: head injury fall    TECHNIQUE: Contiguous axial 5 mm sections were obtained through the head.       COMPARISON: CT head 12/30/2019    FINDINGS:     There is a small amount of acute subarachnoid hemorrhage within the right posterior parietal lobe. No mass effect is found in the brain.  There is no midline shift or herniation pattern. No hydrocephalus. Generalized cerebral volume loss is present. There is no CT evidence for acute cerebral cortical infarct.  There is periventricular and subcortical white matter hypoattenuation,  most compatible with chronic microvascular ischemic changes.     The visualized portions of the paranasal sinuses and mastoid air cells are clear.    Very small hematoma within the left frontal scalp.        IMPRESSION:       Small amount of acute subarachnoid hemorrhage within the right posterior parietal lobe. No mass effect is found in the brain.    Rest the chronic findings as above.      Critical value:  I discussed the finding of this report with Dr. Colon at 11:23 AM on 5/7/2020.  Critical value policy of the hospital was followed.  Read back and confirmation of receipt of this communication was performed.  This verbal communication supplements the text report of this document.                  FRACISCO NUNEZ M.D., ATTENDING RADIOLOGIST  This document has been electronically signed. May  7 2020 11:31AM                  EXAM:  XR CHEST AP OR PA 1V                                  PROCEDURE DATE:  05/07/2020          INTERPRETATION:    CLINICAL STATEMENT: Follow-up chest pain.    TECHNIQUE: AP view of the chest.    COMPARISON: 12/30/2019    FINDINGS/  IMPRESSION:  Left cardiac device, sternotomy wires again noted.    New bilateral airspace opacities right greater than left.    Trace left pleural effusion. Biapical pleural thickening.    Heart size cannot be accurately assessed in this projection.                      ERIN ARGUETA M.D., ATTENDING RADIOLOGIST  This document has been electronically signed. May  7 2020 11:27AM

## 2020-05-07 NOTE — ED PROVIDER NOTE - OBJECTIVE STATEMENT
89F transferred from [facility] ED for admission s/p unwitnessed fall, ICH, COVID+, from nursing home, DNR/DNI.  Per [facility] ED note, pt family declined transfer to [place] for NSx intervention.  Pt given vanco/zosyn, fluids prior to arrival to ED.

## 2020-05-07 NOTE — H&P ADULT - HISTORY OF PRESENT ILLNESS
89F transferred from [facility] ED for admission s/p unwitnessed fall, ICH, COVID+, from nursing home, DNR/DNI.  Per [facility] ED note, pt family declined transfer to [place] for NSx intervention.  Pt given vanco/zosyn, fluids prior to arrival to ED.  patient is being admitted for comfort care

## 2020-05-07 NOTE — ED ADULT TRIAGE NOTE - CHIEF COMPLAINT QUOTE
pt transferred from [facility] for admission, pt dx with covid, s/p fall, subarachnoid hematoma, hypotension, hypothermia, left pleural effusion, DNR/DNI and family declined transfer to [place] for neurosurgery,

## 2020-05-07 NOTE — ED ADULT NURSE NOTE - NSIMPLEMENTINTERV_GEN_ALL_ED
Implemented All Fall with Harm Risk Interventions:  [place] to call system. Call bell, personal items and telephone within reach. Instruct patient to call for assistance. Room bathroom lighting operational. Non-slip footwear when patient is off stretcher. Physically safe environment: no spills, clutter or unnecessary equipment. Stretcher in lowest position, wheels locked, appropriate side rails in place. Provide visual cue, wrist band, yellow gown, etc. Monitor gait and stability. Monitor for mental status changes and reorient to person, place, and time. Review medications for side effects contributing to fall risk. Reinforce activity limits and safety measures with patient and family. Provide visual clues: red socks.

## 2020-05-07 NOTE — CONSULT NOTE ADULT - SUBJECTIVE AND OBJECTIVE BOX
fall sah arf ams hypernatremia  sah supportive treatment  monitor electrolytes  spoke to family in detail

## 2020-05-07 NOTE — H&P ADULT - NSHPPHYSICALEXAM_GEN_ALL_CORE
· CONSTITUTIONAL: - - -  · Appearance: POOR HYGIENE, UNKEMPT  · Development: well developed  · Distress: MILD  · Manner: appropriate for situation  · Mentation: awake, alert  · Mood: appropriate  · Nourishment: CACHECTIC  · ENMT: Airway patent, Nasal mucosa clear. Mouth with dried blood, Midline uvula.  · EYES: Clear bilaterally, pupils equal, round and reactive to light.  · CARDIAC: - - -  · CARDIAC RHYTHM: regular  · CARDIAC RATE: TACHYCARDIC  · CARDIAC SOUNDS: S1-S2  · CARDIAC PEDAL EDEMA: absent  · CARDIAC CAPI REFILL: less than or equal to 2 seconds  · RESPIRATORY: Breath sounds clear and equal bilaterally.  · GASTROINTESTINAL: Abdomen soft, non-tender, no guarding.  · MUSCULOSKELETAL: Spine appears normal, range of motion is not limited, no muscle or joint tenderness  · NEUROLOGICAL: - - -  · NEURO LOC: alert  CONFUSED  moving all extremities, agitated during exam due to simultaneous cleaning of mouth  · NEURO NECK: normal  · SKIN: WOUNDS  · SKIN COLOR: normal for race  · SKIN TEMP: warm  · SKIN CAP REFILL: less than 2 seconds  · SKIN TURGOR: resilient/elastic  · SKIN WOUND TYPE: BRUSING; multiple bruises in upper and lower extremities  · PSYCHIATRIC: - - -  · PSYCH LOC: alert  · PSYCH MOOD: AGITATED  · PSYCH AFFECT: HEIGHTENED

## 2020-05-07 NOTE — ED PROVIDER NOTE - SECONDARY DIAGNOSIS.
Pneumonia due to infectious organism, unspecified laterality, unspecified part of lung Hypothermia, initial encounter Subarachnoid bleed

## 2020-05-07 NOTE — ED PROVIDER NOTE - CARE PLAN
Principal Discharge DX:	Fall, initial encounter Principal Discharge DX:	COVID-19 virus infection  Secondary Diagnosis:	Pneumonia due to infectious organism, unspecified laterality, unspecified part of lung  Secondary Diagnosis:	Hypothermia, initial encounter Principal Discharge DX:	COVID-19 virus infection  Secondary Diagnosis:	Pneumonia due to infectious organism, unspecified laterality, unspecified part of lung  Secondary Diagnosis:	Hypothermia, initial encounter  Secondary Diagnosis:	Subarachnoid bleed

## 2020-05-07 NOTE — ED PROVIDER NOTE - PHYSICAL EXAMINATION
Gen: demented  Head/eyes: NC/AT  ENT: airway patent  Neck: supple, no tenderness/meningismus/JVD, Trachea midline  Pulm/lung: decreased breath sounds b/l, normal resp effort, no wheeze/stridor/retractions  CV/heart: RRR, no M/R/G, +2 dist pulses (radial, pedal DP/PT, popliteal)  GI/Abd: soft, NT/ND, +BS, no guarding/rebound tenderness  Musculoskeletal: no edema/erythema/cyanosis, FROM in all extremities, no C/T/L spine ttp  Skin: +small <0.5cm forehead laceration with surrounding hematoma  Neuro: demented, moving all extremities

## 2020-05-08 DIAGNOSIS — J12.81 PNEUMONIA DUE TO SARS-ASSOCIATED CORONAVIRUS: ICD-10-CM

## 2020-05-08 DIAGNOSIS — R62.7 ADULT FAILURE TO THRIVE: ICD-10-CM

## 2020-05-08 DIAGNOSIS — N17.9 ACUTE KIDNEY FAILURE, UNSPECIFIED: ICD-10-CM

## 2020-05-08 DIAGNOSIS — Z51.5 ENCOUNTER FOR PALLIATIVE CARE: ICD-10-CM

## 2020-05-08 LAB
CULTURE RESULTS: SIGNIFICANT CHANGE UP
SPECIMEN SOURCE: SIGNIFICANT CHANGE UP

## 2020-05-08 RX ORDER — ACETAMINOPHEN 500 MG
650 TABLET ORAL EVERY 4 HOURS
Refills: 0 | Status: DISCONTINUED | OUTPATIENT
Start: 2020-05-08 | End: 2020-05-11

## 2020-05-08 RX ADMIN — Medication 650 MILLIGRAM(S): at 22:42

## 2020-05-08 RX ADMIN — Medication 10 MILLIGRAM(S): at 22:42

## 2020-05-08 RX ADMIN — SODIUM CHLORIDE 80 MILLILITER(S): 9 INJECTION, SOLUTION INTRAVENOUS at 02:28

## 2020-05-08 RX ADMIN — Medication 1 MILLIGRAM(S): at 02:28

## 2020-05-08 NOTE — CONSULT NOTE ADULT - SUBJECTIVE AND OBJECTIVE BOX
HPI:  89F transferred from [place] ED for admission s/p unwitnessed fall, ICH, COVID+, from nursing home, DNR/DNI.  pt family declined transfer to [place] for NSx intervention.  Pt given vanco/zosyn, fluids prior to arrival to ED.  patient is being admitted for comfort care (07 May 2020 18:40)      PAST MEDICAL & SURGICAL HISTORY:  Anxiety  GERD (gastroesophageal reflux disease)  Retention of urine  Agitation  Neck pain  Hyperlipidemia, unspecified hyperlipidemia type  Essential hypertension  Chronic back pain  CAD (coronary artery disease)  Cataract: right eye  Hypothyroid  DVT (deep venous thrombosis), right  Pacemaker  S/P coronary artery bypass graft x 1      Antimicrobials      Immunological      Other  acetaminophen   Tablet .. 650 milliGRAM(s) Oral every 6 hours PRN  atropine 1% Ophthalmic Solution for SubLingual Use 3 Drop(s) SubLingual every 4 hours PRN  bisacodyl 5 milliGRAM(s) Oral every 12 hours PRN  HYDROmorphone  Injectable 1 milliGRAM(s) IV Push every 1 hour PRN  LORazepam   Injectable 1 milliGRAM(s) IV Push every 30 minutes PRN  magnesium hydroxide Suspension 30 milliLiter(s) Oral daily PRN  senna 2 Tablet(s) Oral at bedtime  sodium chloride 0.45%. 1000 milliLiter(s) IV Continuous <Continuous>      Allergies    No Known Allergies    Intolerances        SOCIAL HISTORY:  Social History:  · Tobacco Usage  Unknown if ever smoked (07 May 2020 18:40)      FAMILY HISTORY:      ROS:    limited    Vital Signs Last 24 Hrs  T(C): 36.2 (08 May 2020 08:00), Max: 36.3 (08 May 2020 04:31)  T(F): 97.1 (08 May 2020 08:00), Max: 97.4 (08 May 2020 04:31)  HR: 62 (08 May 2020 08:00) (60 - 62)  BP: 125/50 (08 May 2020 08:00) (125/50 - 126/50)  BP(mean): --  RR: 16 (08 May 2020 08:00) (16 - 18)  SpO2: 89% (08 May 2020 08:00) (89% - 94%)    PE:    HEENT:  NC, atraumatic  Lungs:  No accessory muscle use, breathing comfortably  Cor:  distant  Abd:  Symmetric, appears normal,   Extrem:  No cyanosis      LABS:                        11.9   18.37 )-----------( 241      ( 07 May 2020 10:09 )             39.3       WBC Count: 18.37 K/uL (20 @ 10:09)          163<HH>  |  126<H>  |  104<H>  ----------------------------<  127<H>  3.1<L>   |  18<L>  |  6.06<H>    Ca    8.6      07 May 2020 10:09    TPro  7.0  /  Alb  1.7<L>  /  TBili  0.3  /  DBili  x   /  AST  33  /  ALT  17  /  AlkPhos  138<H>        Creatinine, Serum: 6.06 mg/dL (20 @ 10:09)      Urinalysis Basic - ( 07 May 2020 11:24 )    Color: Yellow / Appearance: Clear / S.020 / pH: x  Gluc: x / Ketone: Negative  / Bili: Negative / Urobili: Negative mg/dL   Blood: x / Protein: 30 mg/dL / Nitrite: Negative   Leuk Esterase: Negative / RBC: x / WBC 0-2   Sq Epi: x / Non Sq Epi: Occasional / Bacteria: Few            COVID RISK SCORE:  Auto Neutrophil #: 16.17 K/uL (20 @ 10:09)  Auto Lymphocyte #: 1.10 K/uL (20 @ 10:09)    Lactate, Blood: 2.8 mmol/L (20 @ 12:25)  Lactate, Blood: 2.6 mmol/L (20 @ 10:09)    Auto Eosinophil #: 0.73 K/uL (20 @ 10:09)                                MICROBIOLOGY:      RADIOLOGY & ADDITIONAL STUDIES:    --

## 2020-05-08 NOTE — PROGRESS NOTE ADULT - SUBJECTIVE AND OBJECTIVE BOX
Date/Time Patient Seen:  		  Referring MD:   Data Reviewed	       Patient is a 89y old  Female who presents with a chief complaint of fall (07 May 2020 18:40)      Subjective/HPI     PAST MEDICAL & SURGICAL HISTORY:  Anxiety  GERD (gastroesophageal reflux disease)  Retention of urine  Agitation  Neck pain  Hyperlipidemia, unspecified hyperlipidemia type  Essential hypertension  Chronic back pain  CAD (coronary artery disease)  Cataract: right eye  Hypothyroid  DVT (deep venous thrombosis), right  Pacemaker  S/P coronary artery bypass graft x 1        Medication list         MEDICATIONS  (STANDING):  senna 2 Tablet(s) Oral at bedtime  sodium chloride 0.45%. 1000 milliLiter(s) (80 mL/Hr) IV Continuous <Continuous>    MEDICATIONS  (PRN):  acetaminophen   Tablet .. 650 milliGRAM(s) Oral every 6 hours PRN Temp greater or equal to 38C (100.4F)  atropine 1% Ophthalmic Solution for SubLingual Use 3 Drop(s) SubLingual every 4 hours PRN oral secretions  bisacodyl 5 milliGRAM(s) Oral every 12 hours PRN Constipation  HYDROmorphone  Injectable 1 milliGRAM(s) IV Push every 1 hour PRN dyspnea, distress, suffering, pain, palliative measures  LORazepam   Injectable 1 milliGRAM(s) IV Push every 30 minutes PRN dyspnea, distress, suffering, agitation  magnesium hydroxide Suspension 30 milliLiter(s) Oral daily PRN Constipation         Vitals log        ICU Vital Signs Last 24 Hrs  T(C): 36.3 (08 May 2020 04:31), Max: 36.7 (07 May 2020 17:14)  T(F): 97.4 (08 May 2020 04:31), Max: 98.1 (07 May 2020 17:14)  HR: 62 (08 May 2020 08:00) (60 - 98)  BP: 125/50 (08 May 2020 08:00) (96/58 - 130/60)  BP(mean): --  ABP: --  ABP(mean): --  RR: 16 (08 May 2020 08:00) (12 - 22)  SpO2: 89% (08 May 2020 08:00) (89% - 100%)           Input and Output:  I&O's Detail    07 May 2020 07:01  -  08 May 2020 07:00  --------------------------------------------------------  IN:    sodium chloride 0.45%.: 1200 mL  Total IN: 1200 mL    OUT:    Indwelling Catheter - Urethral: 200 mL  Total OUT: 200 mL    Total NET: 1000 mL          Lab Data                        11.9   18.37 )-----------( 241      ( 07 May 2020 10:09 )             39.3     05-07    163<HH>  |  126<H>  |  104<H>  ----------------------------<  127<H>  3.1<L>   |  18<L>  |  6.06<H>    Ca    8.6      07 May 2020 10:09    TPro  7.0  /  Alb  1.7<L>  /  TBili  0.3  /  DBili  x   /  AST  33  /  ALT  17  /  AlkPhos  138<H>  05-07            Review of Systems	      Objective     Physical Examination    heart s1s2  lung dec BS  abd soft  on o2 support  frail  weak      Pertinent Lab findings & Imaging      José:  NO   Adequate UO     I&O's Detail    07 May 2020 07:01  -  08 May 2020 07:00  --------------------------------------------------------  IN:    sodium chloride 0.45%.: 1200 mL  Total IN: 1200 mL    OUT:    Indwelling Catheter - Urethral: 200 mL  Total OUT: 200 mL    Total NET: 1000 mL               Discussed with:     Cultures:	        Radiology

## 2020-05-08 NOTE — ADVANCED PRACTICE NURSE CONSULT - ASSESSMENT
RN assessed patient found to have stage 2 pressure injury: RNs are independent in the identification and the care of a patient with stage one and stage 2 pressure injuries

## 2020-05-08 NOTE — CONSULT NOTE ADULT - PROBLEM SELECTOR RECOMMENDATION 9
SDH - TALON - Frailty - weakness - fall - OP - OA - TALON - CKD - Anemia -   spoke with DTR and SON  pt is DNR DNI  comfort care  hospice referral  family wishes for CMO - and palliation  prognosis POOR
Thank you for consulting us and involving us in the management of this most interesting and challenging case. I certainly appreciate the challenges, stress and sacrifice during these difficult and challenging times.  Please call with any further questions or changes in clinical status for which ID input would be helpful

## 2020-05-08 NOTE — PROGRESS NOTE ADULT - ASSESSMENT
1.	TALON on CKD 3: Prerenal azotemia, ? ATN, R/o retention  2.	Hypokalemia  3.	Hypernatremia  4.	COVID +  5.	s/p Fall  6.	Hyperchloremic acidosis    Events noted. Pt currently on comfort care. Overall prognosis poor. family aware.   Will sign off. Please recall if needed. Thank you.

## 2020-05-08 NOTE — PROGRESS NOTE ADULT - SUBJECTIVE AND OBJECTIVE BOX
Medicine Progress Note    Patient is a 89y old  Female who presents with a chief complaint of fall (08 May 2020 17:43)      SUBJECTIVE / OVERNIGHT EVENTS: lethargic    ADDITIONAL REVIEW OF SYSTEMS:    MEDICATIONS  (STANDING):  acetaminophen  Suppository .. 650 milliGRAM(s) Rectal every 4 hours  bisacodyl Suppository 10 milliGRAM(s) Rectal every 24 hours  sodium chloride 0.45%. 1000 milliLiter(s) (80 mL/Hr) IV Continuous <Continuous>    MEDICATIONS  (PRN):  atropine 1% Ophthalmic Solution for SubLingual Use 3 Drop(s) SubLingual every 4 hours PRN oral secretions  HYDROmorphone  Injectable 1 milliGRAM(s) IV Push every 1 hour PRN dyspnea, distress, suffering, pain, palliative measures  LORazepam   Injectable 1 milliGRAM(s) IV Push every 30 minutes PRN dyspnea, distress, suffering, agitation    CAPILLARY BLOOD GLUCOSE        I&O's Summary    07 May 2020 07:  -  08 May 2020 07:00  --------------------------------------------------------  IN: 1200 mL / OUT: 200 mL / NET: 1000 mL    08 May 2020 07:01  -  08 May 2020 19:59  --------------------------------------------------------  IN: 960 mL / OUT: 0 mL / NET: 960 mL        PHYSICAL EXAM:  Vital Signs Last 24 Hrs  T(C): 36.2 (08 May 2020 08:00), Max: 36.3 (08 May 2020 04:31)  T(F): 97.1 (08 May 2020 08:00), Max: 97.4 (08 May 2020 04:31)  HR: 62 (08 May 2020 08:00) (60 - 62)  BP: 125/50 (08 May 2020 08:00) (125/50 - 126/50)  BP(mean): --  RR: 16 (08 May 2020 08:00) (16 - 18)  SpO2: 89% (08 May 2020 08:00) (89% - 94%)  CONSTITUTIONAL: NAD, well-developed, well-groomed  ENMT: Moist oral mucosa, no pharyngeal injection or exudates; normal dentition  RESPIRATORY: Normal respiratory effort; lungs are clear to auscultation bilaterally  CARDIOVASCULAR: Regular rate and rhythm, normal S1 and S2, no murmur/rub/gallop; No lower extremity edema; Peripheral pulses are 2+ bilaterally  ABDOMEN: Nontender to palpation, normoactive bowel sounds, no rebound/guarding; No hepatosplenomegaly  NEUROLOGY: CN 2-12 are intact and symmetric; no gross sensory deficits   SKIN: No rashes; no palpable lesions    LABS:                        11.9   18.37 )-----------( 241      ( 07 May 2020 10:09 )             39.3     05-07    163<HH>  |  126<H>  |  104<H>  ----------------------------<  127<H>  3.1<L>   |  18<L>  |  6.06<H>    Ca    8.6      07 May 2020 10:09    TPro  7.0  /  Alb  1.7<L>  /  TBili  0.3  /  DBili  x   /  AST  33  /  ALT  17  /  AlkPhos  138<H>  05-07          Urinalysis Basic - ( 07 May 2020 11:24 )    Color: Yellow / Appearance: Clear / S.020 / pH: x  Gluc: x / Ketone: Negative  / Bili: Negative / Urobili: Negative mg/dL   Blood: x / Protein: 30 mg/dL / Nitrite: Negative   Leuk Esterase: Negative / RBC: x / WBC 0-2   Sq Epi: x / Non Sq Epi: Occasional / Bacteria: Few        Culture - Urine (collected 07 May 2020 16:41)  Source: .Urine Clean Catch (Midstream)  Final Report (08 May 2020 15:28):    <10,000 CFU/mL Normal Urogenital Gracie    Culture - Blood (collected 07 May 2020 16:33)  Source: .Blood Blood  Preliminary Report (08 May 2020 17:01):    No growth to date.    Culture - Blood (collected 07 May 2020 16:33)  Source: .Blood Blood  Preliminary Report (08 May 2020 17:01):    No growth to date.      COVID-19 PCR: Detected (07 May 2020 10:09)      RADIOLOGY & ADDITIONAL TESTS:  Imaging from Last 24 Hours:    Electrocardiogram/QTc Interval:    COORDINATION OF CARE:  Care Discussed with Consultants/Other Providers:

## 2020-05-08 NOTE — CONSULT NOTE ADULT - ASSESSMENT
89F transferred from [place] ED for admission s/p unwitnessed fall, ICH, COVID+, from nursing home, DNR/DNI.  pt family declined transfer to [place] for NSx intervention.  Pt given vanco/zosyn, fluids prior to arrival to ED.  patient is being admitted for comfort care    -based on this clinical picture concur with comfort care approach.

## 2020-05-08 NOTE — PROGRESS NOTE ADULT - PROBLEM SELECTOR PLAN 1
covid - [facility] - Frailty - weakness - fall - OP - OA - Ataxic gait - Dementia -   on comfort measures  ct imaging and labs reviewed with children  pt is DNR DNI  goals of care reviewed and documented  prognosis very poor  Hospice referral  assist with all Needs and ADL  Opioids and Benzo regimen available PRN use and Atropine and Artificial Tears PRN and Bowel regimen  will follow  vitals Daily - CMO status

## 2020-05-08 NOTE — PROGRESS NOTE ADULT - SUBJECTIVE AND OBJECTIVE BOX
Neurology follow up note    PETER [id number]      Interval History:    Patient resting in bed     MEDICATIONS    acetaminophen   Tablet .. 650 milliGRAM(s) Oral every 6 hours PRN  atropine 1% Ophthalmic Solution for SubLingual Use 3 Drop(s) SubLingual every 4 hours PRN  bisacodyl 5 milliGRAM(s) Oral every 12 hours PRN  HYDROmorphone  Injectable 1 milliGRAM(s) IV Push every 1 hour PRN  LORazepam   Injectable 1 milliGRAM(s) IV Push every 30 minutes PRN  magnesium hydroxide Suspension 30 milliLiter(s) Oral daily PRN  senna 2 Tablet(s) Oral at bedtime  sodium chloride 0.45%. 1000 milliLiter(s) IV Continuous <Continuous>      Allergies    No Known Allergies    Intolerances        Height (cm): 160.02 (-07 @ 13:25)    Vital Signs Last 24 Hrs  T(C): 36.3 (08 May 2020 04:31), Max: 36.7 (07 May 2020 17:14)  T(F): 97.4 (08 May 2020 04:31), Max: 98.1 (07 May 2020 17:14)  HR: 62 (08 May 2020 08:00) (60 - 98)  BP: 125/50 (08 May 2020 08:00) (102/52 - 130/60)  BP(mean): --  RR: 16 (08 May 2020 08:00) (12 - 18)  SpO2: 89% (08 May 2020 08:00) (89% - 100%)    REVIEW OF SYSTEMS:  Could not be obtained from the patient secondary to the patient being nonverbal.    PHYSICAL EXAMINATION:  VITAL SIGNS:  Temperature 98.5, pulse 98, blood pressure is 123/53, and respirations 18.  HEENT:  Head:  Normocephalic.  Positive left scalp hematoma was noted.  Eyes:  No scleral icterus.  Ears:  Hard to evaluate secondary would not verbalize.  NECK:  Increased tone.  RESPIRATORY:  Decreased breath sounds bilaterally.  CARDIOVASCULAR:  S1 and S2 heard.  ABDOMEN:  Soft and nontender.  EXTREMITIES:  Positive ecchymoses were noted in all four extremities, and bruises.  NEUROLOGIC:  The patient is awake in bed.  Positive on and off blink to visual threat.  The patient would not follow any commands.  Speech; the patient would just moan.  Motor:  I applied stimuli to all four extremities with equal withdrawal of bilateral upper and lower.  The patient is cachectic in appearance.              LABS:  CBC Full  -  ( 07 May 2020 10:09 )  WBC Count : 18.37 K/uL  RBC Count : 4.29 M/uL  Hemoglobin : 11.9 g/dL  Hematocrit : 39.3 %  Platelet Count - Automated : 241 K/uL  Mean Cell Volume : 91.6 fl  Mean Cell Hemoglobin : 27.7 pg  Mean Cell Hemoglobin Concentration : 30.3 gm/dL  Auto Neutrophil # : 16.17 K/uL  Auto Lymphocyte # : 1.10 K/uL  Auto Monocyte # : 0.37 K/uL  Auto Eosinophil # : 0.73 K/uL  Auto Basophil # : 0.00 K/uL  Auto Neutrophil % : 73.0 %  Auto Lymphocyte % : 6.0 %  Auto Monocyte % : 2.0 %  Auto Eosinophil % : 4.0 %  Auto Basophil % : 0.0 %    Urinalysis Basic - ( 07 May 2020 11:24 )    Color: Yellow / Appearance: Clear / S.020 / pH: x  Gluc: x / Ketone: Negative  / Bili: Negative / Urobili: Negative mg/dL   Blood: x / Protein: 30 mg/dL / Nitrite: Negative   Leuk Esterase: Negative / RBC: x / WBC 0-2   Sq Epi: x / Non Sq Epi: Occasional / Bacteria: Few      05-07    163<HH>  |  126<H>  |  104<H>  ----------------------------<  127<H>  3.1<L>   |  18<L>  |  6.06<H>    Ca    8.6      07 May 2020 10:09    TPro  7.0  /  Alb  1.7<L>  /  TBili  0.3  /  DBili  x   /  AST  33  /  ALT  17  /  AlkPhos  138<H>  05-07    Hemoglobin A1C:     LIVER FUNCTIONS - ( 07 May 2020 10:09 )  Alb: 1.7 g/dL / Pro: 7.0 g/dL / ALK PHOS: 138 U/L / ALT: 17 U/L DA / AST: 33 U/L / GGT: x           Vitamin B12         RADIOLOGY      ANALYSIS AND PLAN:  This is a 89-year-old with altered mental status, subarachnoid hemorrhage, and fall.  1.	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  2.	Advanced care directives were discussed with the son, [place], at [phone].  The caregiver was given safety screening and concerns.  I would recommend fall precautions.  3.	on comfort care   4.	will sign off   Greater than 15 minutes of time was spent with the patient, plan of care, reviewing data, speaking to the family and multidisciplinary healthcare team

## 2020-05-08 NOTE — PROGRESS NOTE ADULT - SUBJECTIVE AND OBJECTIVE BOX
Patient is a 89y old  Female who presents with a chief complaint of fall (08 May 2020 09:43)    Patient seen in follow up for TALON.   Events noted. Pt on comfort care.        PAST MEDICAL HISTORY:  Anxiety  GERD (gastroesophageal reflux disease)  Retention of urine  Agitation  Neck pain  Hyperlipidemia, unspecified hyperlipidemia type  Essential hypertension  Chronic back pain  CAD (coronary artery disease)  Cataract  Hypothyroid  DVT (deep venous thrombosis), right    MEDICATIONS  (STANDING):  senna 2 Tablet(s) Oral at bedtime  sodium chloride 0.45%. 1000 milliLiter(s) (80 mL/Hr) IV Continuous <Continuous>    MEDICATIONS  (PRN):  acetaminophen   Tablet .. 650 milliGRAM(s) Oral every 6 hours PRN Temp greater or equal to 38C (100.4F)  atropine 1% Ophthalmic Solution for SubLingual Use 3 Drop(s) SubLingual every 4 hours PRN oral secretions  bisacodyl 5 milliGRAM(s) Oral every 12 hours PRN Constipation  HYDROmorphone  Injectable 1 milliGRAM(s) IV Push every 1 hour PRN dyspnea, distress, suffering, pain, palliative measures  LORazepam   Injectable 1 milliGRAM(s) IV Push every 30 minutes PRN dyspnea, distress, suffering, agitation  magnesium hydroxide Suspension 30 milliLiter(s) Oral daily PRN Constipation    T(C): 36.3 (20 @ 04:31), Max: 36.7 (20 @ 17:14)  HR: 62 (20 @ 08:00) (60 - 98)  BP: 125/50 (20 @ 08:00) (90/43 - 130/60)  RR: 16 (20 @ 08:00) (12 - 28)  SpO2: 89% (20 @ 08:00) (89% - 100%)  Wt(kg): --  I&O's Detail    07 May 2020 07:01  -  08 May 2020 07:00  --------------------------------------------------------  IN:    sodium chloride 0.45%.: 1200 mL  Total IN: 1200 mL    OUT:    Indwelling Catheter - Urethral: 200 mL  Total OUT: 200 mL    Total NET: 1000 mL      08 May 2020 07:01  -  08 May 2020 11:55  --------------------------------------------------------  IN:    sodium chloride 0.45%.: 240 mL  Total IN: 240 mL    OUT:  Total OUT: 0 mL    Total NET: 240 mL          PHYSICAL EXAM:  Pt on COVID precautions. Chart reviewed and previous physical examination noted.                               11.9   18.37 )-----------( 241      ( 07 May 2020 10:09 )             39.3         163<HH>  |  126<H>  |  104<H>  ----------------------------<  127<H>  3.1<L>   |  18<L>  |  6.06<H>    Ca    8.6      07 May 2020 10:09    TPro  7.0  /  Alb  1.7<L>  /  TBili  0.3  /  DBili  x   /  AST  33  /  ALT  17  /  AlkPhos  138<H>  05        LIVER FUNCTIONS - ( 07 May 2020 10:09 )  Alb: 1.7 g/dL / Pro: 7.0 g/dL / ALK PHOS: 138 U/L / ALT: 17 U/L DA / AST: 33 U/L / GGT: x           Urinalysis Basic - ( 07 May 2020 11:24 )    Color: Yellow / Appearance: Clear / S.020 / pH: x  Gluc: x / Ketone: Negative  / Bili: Negative / Urobili: Negative mg/dL   Blood: x / Protein: 30 mg/dL / Nitrite: Negative   Leuk Esterase: Negative / RBC: x / WBC 0-2   Sq Epi: x / Non Sq Epi: Occasional / Bacteria: Few        Sodium, Serum: 163 ( @ 10:09)    Creatinine, Serum: 6.06 ( @ 10:09)    Potassium, Serum: 3.1 ( @ 10:09)    Hemoglobin: 11.9 ( @ 10:09)

## 2020-05-09 RX ORDER — FENTANYL CITRATE 50 UG/ML
1 INJECTION INTRAVENOUS
Refills: 0 | Status: DISCONTINUED | OUTPATIENT
Start: 2020-05-09 | End: 2020-05-11

## 2020-05-09 RX ADMIN — Medication 650 MILLIGRAM(S): at 09:43

## 2020-05-09 RX ADMIN — Medication 10 MILLIGRAM(S): at 22:31

## 2020-05-09 RX ADMIN — Medication 650 MILLIGRAM(S): at 13:09

## 2020-05-09 RX ADMIN — FENTANYL CITRATE 1 PATCH: 50 INJECTION INTRAVENOUS at 22:25

## 2020-05-09 RX ADMIN — Medication 650 MILLIGRAM(S): at 22:30

## 2020-05-09 RX ADMIN — SODIUM CHLORIDE 80 MILLILITER(S): 9 INJECTION, SOLUTION INTRAVENOUS at 01:18

## 2020-05-09 RX ADMIN — Medication 650 MILLIGRAM(S): at 04:51

## 2020-05-09 RX ADMIN — Medication 650 MILLIGRAM(S): at 17:05

## 2020-05-09 RX ADMIN — Medication 650 MILLIGRAM(S): at 01:19

## 2020-05-09 NOTE — PROGRESS NOTE ADULT - SUBJECTIVE AND OBJECTIVE BOX
Date/Time Patient Seen:  		  Referring MD:   Data Reviewed	       Patient is a 89y old  Female who presents with a chief complaint of fall (08 May 2020 19:58)      Subjective/HPI     PAST MEDICAL & SURGICAL HISTORY:  Anxiety  GERD (gastroesophageal reflux disease)  Retention of urine  Agitation  Neck pain  Hyperlipidemia, unspecified hyperlipidemia type  Essential hypertension  Chronic back pain  CAD (coronary artery disease)  Cataract: right eye  Hypothyroid  DVT (deep venous thrombosis), right  Pacemaker  S/P coronary artery bypass graft x 1        Medication list         MEDICATIONS  (STANDING):  acetaminophen  Suppository .. 650 milliGRAM(s) Rectal every 4 hours  bisacodyl Suppository 10 milliGRAM(s) Rectal every 24 hours  sodium chloride 0.45%. 1000 milliLiter(s) (80 mL/Hr) IV Continuous <Continuous>    MEDICATIONS  (PRN):  atropine 1% Ophthalmic Solution for SubLingual Use 3 Drop(s) SubLingual every 4 hours PRN oral secretions  HYDROmorphone  Injectable 1 milliGRAM(s) IV Push every 1 hour PRN dyspnea, distress, suffering, pain, palliative measures  LORazepam   Injectable 1 milliGRAM(s) IV Push every 30 minutes PRN dyspnea, distress, suffering, agitation         Vitals log        ICU Vital Signs Last 24 Hrs  T(C): 36.3 (09 May 2020 07:47), Max: 36.3 (09 May 2020 07:47)  T(F): 97.4 (09 May 2020 07:47), Max: 97.4 (09 May 2020 07:47)  HR: 82 (09 May 2020 07:47) (82 - 82)  BP: 115/47 (09 May 2020 07:47) (115/47 - 115/47)  BP(mean): --  ABP: --  ABP(mean): --  RR: 18 (09 May 2020 07:47) (18 - 18)  SpO2: 76% (09 May 2020 07:47) (76% - 76%)           Input and Output:  I&O's Detail    08 May 2020 07:01  -  09 May 2020 07:00  --------------------------------------------------------  IN:    sodium chloride 0.45%.: 1920 mL  Total IN: 1920 mL    OUT:    Indwelling Catheter - Urethral: 700 mL  Total OUT: 700 mL    Total NET: 1220 mL          Lab Data                  Review of Systems	      Objective     Physical Examination        Pertinent Lab findings & Imaging      José:  NO   Adequate UO     I&O's Detail    08 May 2020 07:01  -  09 May 2020 07:00  --------------------------------------------------------  IN:    sodium chloride 0.45%.: 1920 mL  Total IN: 1920 mL    OUT:    Indwelling Catheter - Urethral: 700 mL  Total OUT: 700 mL    Total NET: 1220 mL               Discussed with:     Cultures:	        Radiology

## 2020-05-09 NOTE — PROGRESS NOTE ADULT - SUBJECTIVE AND OBJECTIVE BOX
Medicine Progress Note    Patient is a 89y old  Female who presents with a chief complaint of fall (09 May 2020 12:00)      SUBJECTIVE / OVERNIGHT EVENTS: alert awake    ADDITIONAL REVIEW OF SYSTEMS:    MEDICATIONS  (STANDING):  acetaminophen  Suppository .. 650 milliGRAM(s) Rectal every 4 hours  bisacodyl Suppository 10 milliGRAM(s) Rectal every 24 hours  sodium chloride 0.45%. 1000 milliLiter(s) (80 mL/Hr) IV Continuous <Continuous>    MEDICATIONS  (PRN):  atropine 1% Ophthalmic Solution for SubLingual Use 3 Drop(s) SubLingual every 4 hours PRN oral secretions  HYDROmorphone  Injectable 1 milliGRAM(s) IV Push every 1 hour PRN dyspnea, distress, suffering, pain, palliative measures  LORazepam   Injectable 1 milliGRAM(s) IV Push every 30 minutes PRN dyspnea, distress, suffering, agitation    CAPILLARY BLOOD GLUCOSE        I&O's Summary    08 May 2020 07:01  -  09 May 2020 07:00  --------------------------------------------------------  IN: 1920 mL / OUT: 700 mL / NET: 1220 mL    09 May 2020 07:01  -  09 May 2020 19:26  --------------------------------------------------------  IN: 0 mL / OUT: 200 mL / NET: -200 mL        PHYSICAL EXAM:  Vital Signs Last 24 Hrs  T(C): 36.3 (09 May 2020 07:47), Max: 36.3 (09 May 2020 07:47)  T(F): 97.4 (09 May 2020 07:47), Max: 97.4 (09 May 2020 07:47)  HR: 82 (09 May 2020 07:47) (82 - 82)  BP: 115/47 (09 May 2020 07:47) (115/47 - 115/47)  BP(mean): --  RR: 18 (09 May 2020 07:47) (18 - 18)  SpO2: 76% (09 May 2020 07:47) (76% - 76%)  CONSTITUTIONAL: NAD, well-developed, well-groomed  ENMT: Moist oral mucosa, no pharyngeal injection or exudates; normal dentition  RESPIRATORY: Normal respiratory effort; lungs are clear to auscultation bilaterally  CARDIOVASCULAR: Regular rate and rhythm, normal S1 and S2, no murmur/rub/gallop; No lower extremity edema; Peripheral pulses are 2+ bilaterally  ABDOMEN: Nontender to palpation, normoactive bowel sounds, no rebound/guarding; No hepatosplenomegaly  NEUROLOGY: CN 2-12 are intact and symmetric; no gross sensory deficits   SKIN: No rashes; no palpable lesions    LABS:                    Culture - Urine (collected 07 May 2020 16:41)  Source: .Urine Clean Catch (Midstream)  Final Report (08 May 2020 15:28):    <10,000 CFU/mL Normal Urogenital Gracie    Culture - Blood (collected 07 May 2020 16:33)  Source: .Blood Blood  Preliminary Report (08 May 2020 17:01):    No growth to date.    Culture - Blood (collected 07 May 2020 16:33)  Source: .Blood Blood  Preliminary Report (08 May 2020 17:01):    No growth to date.      COVID-19 PCR: Detected (07 May 2020 10:09)      RADIOLOGY & ADDITIONAL TESTS:  Imaging from Last 24 Hours:    Electrocardiogram/QTc Interval:    COORDINATION OF CARE:  Care Discussed with Consultants/Other Providers:

## 2020-05-10 ENCOUNTER — TRANSCRIPTION ENCOUNTER (OUTPATIENT)
Age: 85
End: 2020-05-10

## 2020-05-10 DIAGNOSIS — G93.41 METABOLIC ENCEPHALOPATHY: ICD-10-CM

## 2020-05-10 RX ORDER — FENTANYL CITRATE 50 UG/ML
1 INJECTION INTRAVENOUS
Qty: 0 | Refills: 0 | DISCHARGE
Start: 2020-05-10

## 2020-05-10 RX ORDER — HYDROMORPHONE HYDROCHLORIDE 2 MG/ML
0 INJECTION INTRAMUSCULAR; INTRAVENOUS; SUBCUTANEOUS
Qty: 0 | Refills: 0 | DISCHARGE
Start: 2020-05-10

## 2020-05-10 RX ORDER — ASCORBIC ACID 60 MG
1 TABLET,CHEWABLE ORAL
Qty: 0 | Refills: 0 | DISCHARGE

## 2020-05-10 RX ORDER — ATORVASTATIN CALCIUM 80 MG/1
1 TABLET, FILM COATED ORAL
Qty: 0 | Refills: 0 | DISCHARGE

## 2020-05-10 RX ORDER — ASPIRIN/CALCIUM CARB/MAGNESIUM 324 MG
1 TABLET ORAL
Qty: 0 | Refills: 0 | DISCHARGE

## 2020-05-10 RX ORDER — AMLODIPINE BESYLATE 2.5 MG/1
1 TABLET ORAL
Qty: 0 | Refills: 0 | DISCHARGE

## 2020-05-10 RX ORDER — SENNA PLUS 8.6 MG/1
2 TABLET ORAL
Qty: 0 | Refills: 0 | DISCHARGE

## 2020-05-10 RX ORDER — RANITIDINE HYDROCHLORIDE 150 MG/1
2 TABLET, FILM COATED ORAL
Qty: 0 | Refills: 0 | DISCHARGE

## 2020-05-10 RX ORDER — ALLOPURINOL 300 MG
1 TABLET ORAL
Qty: 0 | Refills: 0 | DISCHARGE

## 2020-05-10 RX ORDER — DOCUSATE SODIUM 100 MG
3 CAPSULE ORAL
Qty: 0 | Refills: 0 | DISCHARGE

## 2020-05-10 RX ORDER — LEVOTHYROXINE SODIUM 125 MCG
1 TABLET ORAL
Qty: 0 | Refills: 0 | DISCHARGE

## 2020-05-10 RX ORDER — MULTIVIT-MIN/FERROUS GLUCONATE 9 MG/15 ML
1 LIQUID (ML) ORAL
Qty: 0 | Refills: 0 | DISCHARGE

## 2020-05-10 RX ORDER — ATROPINE SULFATE 1 %
0 DROPS OPHTHALMIC (EYE)
Qty: 0 | Refills: 0 | DISCHARGE
Start: 2020-05-10

## 2020-05-10 RX ORDER — FERROUS SULFATE 325(65) MG
1 TABLET ORAL
Qty: 0 | Refills: 0 | DISCHARGE

## 2020-05-10 RX ADMIN — Medication 650 MILLIGRAM(S): at 11:41

## 2020-05-10 RX ADMIN — FENTANYL CITRATE 1 PATCH: 50 INJECTION INTRAVENOUS at 07:30

## 2020-05-10 RX ADMIN — Medication 10 MILLIGRAM(S): at 23:11

## 2020-05-10 RX ADMIN — Medication 650 MILLIGRAM(S): at 23:11

## 2020-05-10 RX ADMIN — Medication 650 MILLIGRAM(S): at 17:47

## 2020-05-10 RX ADMIN — HYDROMORPHONE HYDROCHLORIDE 1 MILLIGRAM(S): 2 INJECTION INTRAMUSCULAR; INTRAVENOUS; SUBCUTANEOUS at 00:35

## 2020-05-10 RX ADMIN — Medication 650 MILLIGRAM(S): at 14:55

## 2020-05-10 RX ADMIN — Medication 650 MILLIGRAM(S): at 05:41

## 2020-05-10 NOTE — DISCHARGE NOTE PROVIDER - HOSPITAL COURSE
admitted for fall with TALON and FTT - failure to thrive    family opted for palliative / comfort care    seen by palliative MD    transfer to hospice when bed available

## 2020-05-10 NOTE — DISCHARGE NOTE PROVIDER - NSDCMRMEDTOKEN_GEN_ALL_CORE_FT
acetaminophen 325 mg oral tablet: 2 tab(s) orally every 6 hours, As Needed  atropine 1% ophthalmic solution:  to each affected eye   bisacodyl 10 mg rectal suppository: 1 suppository(ies) rectal every 24 hours  fentaNYL 12 mcg/hr transdermal film, extended release: 1 patch transdermal every 72 hours  HYDROmorphone:   LORazepam:

## 2020-05-10 NOTE — PROGRESS NOTE ADULT - PROBLEM SELECTOR PLAN 1
spoke with Hospice MD this am - discussed Dx and Hospice eligibility -   pt started on Fentanyl Patch in addition to current regimen   covid - SAH - Frailty - weakness - fall - OP - OA - Ataxic gait - Dementia -   on comfort measures  ct imaging and labs reviewed with children  pt is DNR DNI  goals of care reviewed and documented  prognosis very poor  Hospice referral  assist with all Needs and ADL  Opioids and Benzo regimen available PRN use and Atropine and Artificial Tears PRN and Bowel regimen  will follow  vitals Daily - CMO status.

## 2020-05-10 NOTE — PROGRESS NOTE ADULT - SUBJECTIVE AND OBJECTIVE BOX
Medicine Progress Note    Patient is a 89y old  Female who presents with a chief complaint of fall (10 May 2020 10:45)      SUBJECTIVE / OVERNIGHT EVENTS: alert awake, conversive today    ADDITIONAL REVIEW OF SYSTEMS:    MEDICATIONS  (STANDING):  acetaminophen  Suppository .. 650 milliGRAM(s) Rectal every 4 hours  bisacodyl Suppository 10 milliGRAM(s) Rectal every 24 hours  fentaNYL   Patch  12 MICROgram(s)/Hr 1 Patch Transdermal every 72 hours  sodium chloride 0.45%. 1000 milliLiter(s) (80 mL/Hr) IV Continuous <Continuous>    MEDICATIONS  (PRN):  atropine 1% Ophthalmic Solution for SubLingual Use 3 Drop(s) SubLingual every 4 hours PRN oral secretions  HYDROmorphone  Injectable 1 milliGRAM(s) IV Push every 1 hour PRN dyspnea, distress, suffering, pain, palliative measures  LORazepam   Injectable 1 milliGRAM(s) IV Push every 30 minutes PRN dyspnea, distress, suffering, agitation    CAPILLARY BLOOD GLUCOSE        I&O's Summary    09 May 2020 07:01  -  10 May 2020 07:00  --------------------------------------------------------  IN: 400 mL / OUT: 300 mL / NET: 100 mL    10 May 2020 07:01  -  10 May 2020 19:49  --------------------------------------------------------  IN: 960 mL / OUT: 500 mL / NET: 460 mL        PHYSICAL EXAM:  Vital Signs Last 24 Hrs  T(C): 36.4 (10 May 2020 08:21), Max: 36.4 (10 May 2020 08:21)  T(F): 97.6 (10 May 2020 08:21), Max: 97.6 (10 May 2020 08:21)  HR: 62 (10 May 2020 08:21) (62 - 62)  BP: 123/62 (10 May 2020 08:21) (123/62 - 123/62)  BP(mean): --  RR: 18 (10 May 2020 08:21) (18 - 18)  SpO2: 96% (10 May 2020 08:21) (96% - 96%)  CONSTITUTIONAL: NAD, well-developed, well-groomed  ENMT: Moist oral mucosa, no pharyngeal injection or exudates; normal dentition  RESPIRATORY: Normal respiratory effort; lungs are clear to auscultation bilaterally  CARDIOVASCULAR: Regular rate and rhythm, normal S1 and S2, no murmur/rub/gallop; No lower extremity edema; Peripheral pulses are 2+ bilaterally  ABDOMEN: Nontender to palpation, normoactive bowel sounds, no rebound/guarding; No hepatosplenomegaly  NEUROLOGY: CN 2-12 are intact and symmetric; no gross sensory deficits   SKIN: No rashes; no palpable lesions    LABS:                    COVID-19 PCR: Detected (07 May 2020 10:09)      RADIOLOGY & ADDITIONAL TESTS:  Imaging from Last 24 Hours:    Electrocardiogram/QTc Interval:    COORDINATION OF CARE:  Care Discussed with Consultants/Other Providers:

## 2020-05-10 NOTE — DISCHARGE NOTE PROVIDER - NSDCCPCAREPLAN_GEN_ALL_CORE_FT
PRINCIPAL DISCHARGE DIAGNOSIS  Diagnosis: COVID-19 virus detected  Assessment and Plan of Treatment: follow up with hospice MD      SECONDARY DISCHARGE DIAGNOSES  Diagnosis: Pneumonia of right lung due to infectious organism, unspecified part of lung  Assessment and Plan of Treatment:     Diagnosis: SAH (subarachnoid hemorrhage)  Assessment and Plan of Treatment:

## 2020-05-10 NOTE — PROGRESS NOTE ADULT - SUBJECTIVE AND OBJECTIVE BOX
Date/Time Patient Seen:  		  Referring MD:   Data Reviewed	       Patient is a 89y old  Female who presents with a chief complaint of fall (09 May 2020 19:25)      Subjective/HPI     PAST MEDICAL & SURGICAL HISTORY:  Anxiety  GERD (gastroesophageal reflux disease)  Retention of urine  Agitation  Neck pain  Hyperlipidemia, unspecified hyperlipidemia type  Essential hypertension  Chronic back pain  CAD (coronary artery disease)  Cataract: right eye  Hypothyroid  DVT (deep venous thrombosis), right  Pacemaker  S/P coronary artery bypass graft x 1        Medication list         MEDICATIONS  (STANDING):  acetaminophen  Suppository .. 650 milliGRAM(s) Rectal every 4 hours  bisacodyl Suppository 10 milliGRAM(s) Rectal every 24 hours  fentaNYL   Patch  12 MICROgram(s)/Hr 1 Patch Transdermal every 72 hours  sodium chloride 0.45%. 1000 milliLiter(s) (80 mL/Hr) IV Continuous <Continuous>    MEDICATIONS  (PRN):  atropine 1% Ophthalmic Solution for SubLingual Use 3 Drop(s) SubLingual every 4 hours PRN oral secretions  HYDROmorphone  Injectable 1 milliGRAM(s) IV Push every 1 hour PRN dyspnea, distress, suffering, pain, palliative measures  LORazepam   Injectable 1 milliGRAM(s) IV Push every 30 minutes PRN dyspnea, distress, suffering, agitation         Vitals log        ICU Vital Signs Last 24 Hrs  T(C): 36.4 (10 May 2020 08:21), Max: 36.4 (10 May 2020 08:21)  T(F): 97.6 (10 May 2020 08:21), Max: 97.6 (10 May 2020 08:21)  HR: 62 (10 May 2020 08:21) (62 - 62)  BP: 123/62 (10 May 2020 08:21) (123/62 - 123/62)  BP(mean): --  ABP: --  ABP(mean): --  RR: 18 (10 May 2020 08:21) (18 - 18)  SpO2: 96% (10 May 2020 08:21) (96% - 96%)           Input and Output:  I&O's Detail    09 May 2020 07:01  -  10 May 2020 07:00  --------------------------------------------------------  IN:    sodium chloride 0.45%.: 400 mL  Total IN: 400 mL    OUT:    Indwelling Catheter - Urethral: 300 mL  Total OUT: 300 mL    Total NET: 100 mL      10 May 2020 07:01  -  10 May 2020 10:46  --------------------------------------------------------  IN:    sodium chloride 0.45%.: 320 mL  Total IN: 320 mL    OUT:  Total OUT: 0 mL    Total NET: 320 mL          Lab Data                  Review of Systems	      Objective     Physical Examination    heart s1s2  lung dec BS  abd soft  head nc  head at      Pertinent Lab findings & Imaging      José:  NO   Adequate UO     I&O's Detail    09 May 2020 07:01  -  10 May 2020 07:00  --------------------------------------------------------  IN:    sodium chloride 0.45%.: 400 mL  Total IN: 400 mL    OUT:    Indwelling Catheter - Urethral: 300 mL  Total OUT: 300 mL    Total NET: 100 mL      10 May 2020 07:01  -  10 May 2020 10:46  --------------------------------------------------------  IN:    sodium chloride 0.45%.: 320 mL  Total IN: 320 mL    OUT:  Total OUT: 0 mL    Total NET: 320 mL               Discussed with:     Cultures:	        Radiology

## 2020-05-10 NOTE — CHART NOTE - NSCHARTNOTEFT_GEN_A_CORE
Do you have Advance Directives (HCP / LV / Organ donation / Documentation of oral advance Directive):   (  x  )  yes    (      )    NO                                                                            Do you have LV - Living will :                                                                                                                                             (    )  yes    (   x   )   No    Do you have HCP - Health Care Proxy:                                                                                                                            (  x   )  yes   (       ) N0    Do you have DNR- Do Not Resuscitate :                                                                                                                           (  x    )  yes  (        )  No    Do you have DNI- Do Not intubate  :                                                                                                                               (   x   )  yes   (       ) No    Do you have MOLST - Medical orders for Life sustaining treatment  :                                                                    (   x   ) yes    (       ) No    Decision Maker :  (     ) Patient     (    x  )  HCA   (     ) Public Health Law Surrogate     (      ) Surrogate  (       ) Guardian    Goals of Care :  (      )   Complete Care     (       ) No Limitations                              (    x   )   Comfort Care       (     x  )  Hospice                               (    x  )   Limited medical Intervention / s    Medical Interventions :   (        )   CPR       (     x   )  DNR                                               (        )  Intubation with MV - Mechanical Ventilation  (      ) BIPAP/CPAP    (     x    )   DNI                                               (         )  Artificial Nutrition -  IVF, TPN / PPN, Tube Feeds             (     x    )   No Feeding Tube                                                (        ) Use Antibiotics                         (      x    ) No Antibiotics                                                (         ) Blood and Blood Products     (    x     )   No Blood or Blood products                                                (          )  Dialysis                                    (   x      )  No Dialysis                                                (          )  Medical Management only  (     x    )  No Invasive Interventions or Surgery  Time spent :                        (       ) upto 30 minutes                       (     x      )   more than 30 minutes  GOC reviewed and discussed

## 2020-05-11 ENCOUNTER — TRANSCRIPTION ENCOUNTER (OUTPATIENT)
Age: 85
End: 2020-05-11

## 2020-05-11 LAB — GRAM STN FLD: SIGNIFICANT CHANGE UP

## 2020-05-11 RX ADMIN — HYDROMORPHONE HYDROCHLORIDE 1 MILLIGRAM(S): 2 INJECTION INTRAMUSCULAR; INTRAVENOUS; SUBCUTANEOUS at 12:45

## 2020-05-11 RX ADMIN — Medication 650 MILLIGRAM(S): at 05:00

## 2020-05-11 RX ADMIN — FENTANYL CITRATE 1 PATCH: 50 INJECTION INTRAVENOUS at 00:06

## 2020-05-11 RX ADMIN — FENTANYL CITRATE 1 PATCH: 50 INJECTION INTRAVENOUS at 06:52

## 2020-05-11 RX ADMIN — Medication 650 MILLIGRAM(S): at 09:26

## 2020-05-11 RX ADMIN — HYDROMORPHONE HYDROCHLORIDE 1 MILLIGRAM(S): 2 INJECTION INTRAMUSCULAR; INTRAVENOUS; SUBCUTANEOUS at 01:49

## 2020-05-11 RX ADMIN — SODIUM CHLORIDE 80 MILLILITER(S): 9 INJECTION, SOLUTION INTRAVENOUS at 01:48

## 2020-05-11 RX ADMIN — SODIUM CHLORIDE 50 MILLILITER(S): 9 INJECTION, SOLUTION INTRAVENOUS at 11:11

## 2020-05-11 NOTE — CHART NOTE - NSCHARTNOTEFT_GEN_A_CORE
Nutrition Initial Assessment    Nutrition Consult Received: Yes [   ]  No [ X  ]    Reason for Initial Nutrition Assessment: pureed diet    Source of Information: Unable to conduct a fact to face interview due to limited contact restrictions related to pt's medical condition and isolation precautions. Information obtained from the EMR.    Admitting Diagnosis: 89y Female admitted for COVID-19 virus detected    PAST MEDICAL & SURGICAL HISTORY:  Anxiety  GERD (gastroesophageal reflux disease)  Retention of urine  Agitation  Neck pain  Hyperlipidemia, unspecified hyperlipidemia type  Essential hypertension  Chronic back pain  CAD (coronary artery disease)  Cataract: right eye  Hypothyroid  DVT (deep venous thrombosis), right  Pacemaker  S/P coronary artery bypass graft x 1      Subjective Information:       GI Issues: none noted, +BM 5/11    Current Nutrition Order: Pureed  PO Intake:   Good (%) [   ]    Fair (50-75%) [   ]    Poor (<50%) [   ]    Skin Integrity:    Labs: reviewed, now new labs since 5/7          Medications:  MEDICATIONS  (STANDING):  acetaminophen  Suppository .. 650 milliGRAM(s) Rectal every 4 hours  bisacodyl Suppository 10 milliGRAM(s) Rectal every 24 hours  fentaNYL   Patch  12 MICROgram(s)/Hr 1 Patch Transdermal every 72 hours  sodium chloride 0.45%. 1000 milliLiter(s) (50 mL/Hr) IV Continuous <Continuous>    MEDICATIONS  (PRN):  atropine 1% Ophthalmic Solution for SubLingual Use 3 Drop(s) SubLingual every 4 hours PRN oral secretions  HYDROmorphone  Injectable 1 milliGRAM(s) IV Push every 1 hour PRN dyspnea, distress, suffering, pain, palliative measures  LORazepam   Injectable 1 milliGRAM(s) IV Push every 30 minutes PRN dyspnea, distress, suffering, agitation    Admitted Anthropometrics:    Height (cm): 160.02 (05-07-20 @ 13:25), 160.02 (05-07-20 @ 09:44)  Weight (kg): 59 (05-07-20 @ 09:44)  BMI (kg/m2): 23 (05-07-20 @ 13:25), 23 (05-07-20 @ 09:44)    Nutrition Focused Physical Exam: Unable to complete due to limited isolation contact precautions at this time.     Estimated Energy Needs (__ kcal/kg- ___ kcal/kg):   Estimated Protein Needs (__ g/kg- ___ g/kg):   Based on weight of:    [ X ] Nutrition Diagnosis:  [ X ] No active nutrition diagnosis at this time  [ X ] Current medical condition precludes nutrition intervention    Goal:    Nutrition Interventions:     Recommendations:      RD to follow-up per protocol. Nutrition Initial Assessment    Nutrition Consult Received: Yes [   ]  No [ X  ]    Reason for Initial Nutrition Assessment: pureed diet    Source of Information: Unable to conduct a fact to face interview due to limited contact restrictions related to pt's medical condition and isolation precautions. Information obtained from the EMR and phone call with pt's son South.    Admitting Diagnosis: 89y Female admitted for COVID-19 virus detected    PAST MEDICAL & SURGICAL HISTORY:  Anxiety  GERD (gastroesophageal reflux disease)  Retention of urine  Agitation  Neck pain  Hyperlipidemia, unspecified hyperlipidemia type  Essential hypertension  Chronic back pain  CAD (coronary artery disease)  Cataract: right eye  Hypothyroid  DVT (deep venous thrombosis), right  Pacemaker  S/P coronary artery bypass graft x 1      Subjective Information: Pt s/p fall, COVID+. Admitted for end of life care. On IVF, no new labs. Pt is comfort measures only. Spoke to son South ([phone]) re: patient's preferences. +BM 5/11.       GI Issues: none noted, +BM 5/11    Current Nutrition Order: Pureed  PO Intake:   Good (%) [   ]    Fair (50-75%) [   ]    Poor (<50%) [ X ]    Skin Integrity: stage 2 sacrum pressure injury    Labs: reviewed, now new labs since 5/7        Medications:  MEDICATIONS  (STANDING):  acetaminophen  Suppository .. 650 milliGRAM(s) Rectal every 4 hours  bisacodyl Suppository 10 milliGRAM(s) Rectal every 24 hours  fentaNYL   Patch  12 MICROgram(s)/Hr 1 Patch Transdermal every 72 hours  sodium chloride 0.45%. 1000 milliLiter(s) (50 mL/Hr) IV Continuous <Continuous>    MEDICATIONS  (PRN):  atropine 1% Ophthalmic Solution for SubLingual Use 3 Drop(s) SubLingual every 4 hours PRN oral secretions  HYDROmorphone  Injectable 1 milliGRAM(s) IV Push every 1 hour PRN dyspnea, distress, suffering, pain, palliative measures  LORazepam   Injectable 1 milliGRAM(s) IV Push every 30 minutes PRN dyspnea, distress, suffering, agitation    Admitted Anthropometrics:    Height (cm): 160.02 (05-07-20 @ 13:25), 160.02 (05-07-20 @ 09:44)  Weight (kg): 59 (05-07-20 @ 09:44)  BMI (kg/m2): 23 (05-07-20 @ 13:25), 23 (05-07-20 @ 09:44)    Nutrition Focused Physical Exam: Unable to complete due to limited isolation contact precautions at this time.     Estimated Energy Needs: 0092-6426 kcal (25-30 kcal/kg)  Estimated Protein Needs: 66-79 grams of protein (1.0-1.2 g/kg)  Based on weight of: 65.9 kg    [ X ] Nutrition Diagnosis: Inadequate energy intake related to acute viral illness in setting of end of life care as evidenced by pt admitted poor po intake/appetite.   [  ] No active nutrition diagnosis at this time  [ X ] Current medical condition precludes nutrition intervention    Goal: Pt to consume >75% of meals/snacks/supplements to meet estimated needs.     Nutrition Interventions:     Recommendations:  1) Pureed diet as per MD's order. Will provide patient's preferences per son (lactose free ice cream, Ensure). Diet office made aware. Supportive care, spoon feed patient.  2) Add Ensure Enlive BID. Pending order placed to MD.  3) MOLST reviewed: pt is DNI/DNR, no tube feeding. Palliative following as pt appropriate for hospice.    RD to follow-up per protocol. Nutrition Initial Assessment    Nutrition Consult Received: Yes [   ]  No [ X  ]    Reason for Initial Nutrition Assessment: pureed diet    Source of Information: Unable to conduct a fact to face interview due to limited contact restrictions related to pt's medical condition and isolation precautions. Information obtained from the EMR and phone call with pt's son South.    Admitting Diagnosis: 89y Female admitted for COVID-19 virus detected    PAST MEDICAL & SURGICAL HISTORY:  Anxiety  GERD (gastroesophageal reflux disease)  Retention of urine  Agitation  Neck pain  Hyperlipidemia, unspecified hyperlipidemia type  Essential hypertension  Chronic back pain  CAD (coronary artery disease)  Cataract: right eye  Hypothyroid  DVT (deep venous thrombosis), right  Pacemaker  S/P coronary artery bypass graft x 1      Subjective Information: Pt s/p fall, COVID+. Admitted for end of life care. On IVF, no new labs. Pt is comfort measures only. Spoke to son South ([phone]) re: patient's preferences. Estimated ht/ht 63 inches and 145 pounds (UBW, likely less now) c/w old RD notes. +BM 5/11.       GI Issues: none noted, +BM 5/11    Current Nutrition Order: Pureed  PO Intake:   Good (%) [   ]    Fair (50-75%) [   ]    Poor (<50%) [ X ]    Skin Integrity: stage 2 sacrum pressure injury    Labs: reviewed, now new labs since 5/7        Medications:  MEDICATIONS  (STANDING):  acetaminophen  Suppository .. 650 milliGRAM(s) Rectal every 4 hours  bisacodyl Suppository 10 milliGRAM(s) Rectal every 24 hours  fentaNYL   Patch  12 MICROgram(s)/Hr 1 Patch Transdermal every 72 hours  sodium chloride 0.45%. 1000 milliLiter(s) (50 mL/Hr) IV Continuous <Continuous>    MEDICATIONS  (PRN):  atropine 1% Ophthalmic Solution for SubLingual Use 3 Drop(s) SubLingual every 4 hours PRN oral secretions  HYDROmorphone  Injectable 1 milliGRAM(s) IV Push every 1 hour PRN dyspnea, distress, suffering, pain, palliative measures  LORazepam   Injectable 1 milliGRAM(s) IV Push every 30 minutes PRN dyspnea, distress, suffering, agitation    Admitted Anthropometrics:    Height (cm): 160.02 (05-07-20 @ 13:25), 160.02 (05-07-20 @ 09:44)  Weight (kg): 59 (05-07-20 @ 09:44)  BMI (kg/m2): 23 (05-07-20 @ 13:25), 23 (05-07-20 @ 09:44)    Nutrition Focused Physical Exam: Unable to complete due to limited isolation contact precautions at this time.     Estimated Energy Needs: 8170-8508 kcal (25-30 kcal/kg)  Estimated Protein Needs: 66-79 grams of protein (1.0-1.2 g/kg)  Based on weight of: 65.9 kg or 145 based on pt's UBW    [ X ] Nutrition Diagnosis: Inadequate energy intake related to acute viral illness in setting of end of life care as evidenced by pt admitted poor po intake/appetite.   [  ] No active nutrition diagnosis at this time  [ X ] Current medical condition precludes nutrition intervention    Goal: Pt to consume >75% of meals/snacks/supplements to meet estimated needs.     Nutrition Interventions:     Recommendations:  1) Pureed diet as per MD's order. Will provide patient's preferences per son (lactose free ice cream, Ensure). Diet office made aware. Supportive care, spoon feed patient.  2) Add Ensure Enlive BID. Pending order placed to MD.  3) MOLST reviewed: pt is DNI/DNR, no tube feeding. Palliative following as pt appropriate for hospice.    RD to follow-up per protocol.

## 2020-05-11 NOTE — PROGRESS NOTE ADULT - SUBJECTIVE AND OBJECTIVE BOX
Date/Time Patient Seen:  		  Referring MD:   Data Reviewed	       Patient is a 89y old  Female who presents with a chief complaint of fall (10 May 2020 20:00)      Subjective/HPI     PAST MEDICAL & SURGICAL HISTORY:  Anxiety  GERD (gastroesophageal reflux disease)  Retention of urine  Agitation  Neck pain  Hyperlipidemia, unspecified hyperlipidemia type  Essential hypertension  Chronic back pain  CAD (coronary artery disease)  Cataract: right eye  Hypothyroid  DVT (deep venous thrombosis), right  Pacemaker  S/P coronary artery bypass graft x 1        Medication list         MEDICATIONS  (STANDING):  acetaminophen  Suppository .. 650 milliGRAM(s) Rectal every 4 hours  bisacodyl Suppository 10 milliGRAM(s) Rectal every 24 hours  fentaNYL   Patch  12 MICROgram(s)/Hr 1 Patch Transdermal every 72 hours  sodium chloride 0.45%. 1000 milliLiter(s) (50 mL/Hr) IV Continuous <Continuous>    MEDICATIONS  (PRN):  atropine 1% Ophthalmic Solution for SubLingual Use 3 Drop(s) SubLingual every 4 hours PRN oral secretions  HYDROmorphone  Injectable 1 milliGRAM(s) IV Push every 1 hour PRN dyspnea, distress, suffering, pain, palliative measures  LORazepam   Injectable 1 milliGRAM(s) IV Push every 30 minutes PRN dyspnea, distress, suffering, agitation         Vitals log        ICU Vital Signs Last 24 Hrs  T(C): 36.2 (11 May 2020 07:55), Max: 36.2 (11 May 2020 07:55)  T(F): 97.2 (11 May 2020 07:55), Max: 97.2 (11 May 2020 07:55)  HR: 62 (11 May 2020 07:55) (62 - 62)  BP: 127/57 (11 May 2020 07:55) (127/57 - 127/57)  BP(mean): --  ABP: --  ABP(mean): --  RR: 18 (11 May 2020 07:55) (18 - 18)  SpO2: 98% (11 May 2020 07:55) (98% - 98%)           Input and Output:  I&O's Detail    10 May 2020 07:01  -  11 May 2020 07:00  --------------------------------------------------------  IN:    sodium chloride 0.45%.: 1920 mL  Total IN: 1920 mL    OUT:    Indwelling Catheter - Urethral: 650 mL  Total OUT: 650 mL    Total NET: 1270 mL      11 May 2020 07:01  -  11 May 2020 10:25  --------------------------------------------------------  IN:    sodium chloride 0.45%.: 240 mL  Total IN: 240 mL    OUT:  Total OUT: 0 mL    Total NET: 240 mL          Lab Data                  Review of Systems	      Objective     Physical Examination    heart s1s2  lung dec BS  abd soft  on IVF      Pertinent Lab findings & Imaging      José:  NO   Adequate UO     I&O's Detail    10 May 2020 07:01  -  11 May 2020 07:00  --------------------------------------------------------  IN:    sodium chloride 0.45%.: 1920 mL  Total IN: 1920 mL    OUT:    Indwelling Catheter - Urethral: 650 mL  Total OUT: 650 mL    Total NET: 1270 mL      11 May 2020 07:01  -  11 May 2020 10:25  --------------------------------------------------------  IN:    sodium chloride 0.45%.: 240 mL  Total IN: 240 mL    OUT:  Total OUT: 0 mL    Total NET: 240 mL               Discussed with:     Cultures:	        Radiology

## 2020-05-11 NOTE — GOALS OF CARE CONVERSATION - ADVANCED CARE PLANNING - CONVERSATION DETAILS
12:35pm     Discussion with Yoko from [facility]. Pts dtr Brii is her HCP and is on board for hospice care. She will refer
[facility] :  Pt approved  for inpatient hospice care at the [facility] . [facility] consents signed .Pt's daughter Brii in agreement to have Pt moved to the [facility] today .  Adelso BENAVIDES at [facility]  made aware .  Lana Saldaña RN .
spoke with son and dtr  pt is comfort care and hospice referral  dnr dni  prognosis POOR  comfort measures status
Spoke with pts son/HCP [place] he consented to update her molst for DNR DNI no feed tube

## 2020-05-11 NOTE — PROGRESS NOTE ADULT - PROBLEM SELECTOR PLAN 1
Accepted for Inpatient Hospice - pending bed availability  on Fentanyl Patch in addition to current regimen   covid - SAH - Frailty - weakness - fall - OP - OA - Ataxic gait - Dementia -   on comfort measures  ct imaging and labs reviewed with children  pt is DNR DNI  goals of care reviewed and documented  prognosis very poor  Hospice referral  assist with all Needs and ADL  Opioids and Benzo regimen available PRN use and Atropine and Artificial Tears PRN and Bowel regimen  will follow  vitals Daily - CMO status.

## 2020-05-11 NOTE — GOALS OF CARE CONVERSATION - ADVANCED CARE PLANNING - NS PRO AD PATIENT TYPE
Do Not Resuscitate (DNR)
Do Not Resuscitate (DNR)
Health Care Proxy (HCP)/Medical Orders for Life-Sustaining Treatment (MOLST)/Do Not Resuscitate (DNR)
Medical Orders for Life-Sustaining Treatment (MOLST)/Health Care Proxy (HCP)/Do Not Resuscitate (DNR)

## 2020-05-11 NOTE — PROGRESS NOTE ADULT - SUBJECTIVE AND OBJECTIVE BOX
Medicine Progress Note    Patient is a 89y old  Female who presents with a chief complaint of fall (10 May 2020 10:45)      SUBJECTIVE / OVERNIGHT EVENTS: alert awake,     ADDITIONAL REVIEW OF SYSTEMS:    T(C): 36.2 (05-11-20 @ 07:55), Max: 36.2 (05-11-20 @ 07:55)  HR: 62 (05-11-20 @ 07:55) (62 - 62)  BP: 127/57 (05-11-20 @ 07:55) (127/57 - 127/57)  RR: 18 (05-11-20 @ 07:55) (18 - 18)  SpO2: 98% (05-11-20 @ 07:55) (98% - 98%)    MEDICATIONS  (STANDING):  acetaminophen  Suppository .. 650 milliGRAM(s) Rectal every 4 hours  bisacodyl Suppository 10 milliGRAM(s) Rectal every 24 hours  fentaNYL   Patch  12 MICROgram(s)/Hr 1 Patch Transdermal every 72 hours  sodium chloride 0.45%. 1000 milliLiter(s) (50 mL/Hr) IV Continuous <Continuous>    MEDICATIONS  (PRN):  atropine 1% Ophthalmic Solution for SubLingual Use 3 Drop(s) SubLingual every 4 hours PRN oral secretions  HYDROmorphone  Injectable 1 milliGRAM(s) IV Push every 1 hour PRN dyspnea, distress, suffering, pain, palliative measures  LORazepam   Injectable 1 milliGRAM(s) IV Push every 30 minutes PRN dyspnea, distress, suffering, agitation          I&O's Summary    09 May 2020 07:01  -  10 May 2020 07:00  --------------------------------------------------------  IN: 400 mL / OUT: 300 mL / NET: 100 mL    10 May 2020 07:01  -  10 May 2020 19:49  --------------------------------------------------------  IN: 960 mL / OUT: 500 mL / NET: 460 mL        PHYSICAL EXAM:  CONSTITUTIONAL: NAD, well-developed, well-groomed  RESPIRATORY: Normal respiratory effort; lungs are clear to auscultation bilaterally  CARDIOVASCULAR: Regular rate and rhythm, normal S1 and S2, no murmur/rub/gallop; No lower extremity edema; Peripheral pulses are 2+ bilaterally  ABDOMEN: Nontender to palpation, normoactive bowel sounds, no rebound/guarding; No hepatosplenomegaly  NEUROLOGY: CN 2-12 are intact and symmetric; no gross sensory deficits   SKIN: No rashes; no palpable lesions    LABS:                    COVID-19 PCR: Detected (07 May 2020 10:09)      RADIOLOGY & ADDITIONAL TESTS:  Imaging from Last 24 Hours:    Electrocardiogram/QTc Interval:    COORDINATION OF CARE:  Care Discussed with Consultants/Other Providers:

## 2020-05-11 NOTE — DISCHARGE NOTE NURSING/CASE MANAGEMENT/SOCIAL WORK - PATIENT PORTAL LINK FT
You can access the FollowMyHealth Patient Portal offered by [facility] by registering at the following website: [email or web address]. By joining [employer]’s FollowMyHealth portal, you will also be able to view your health information using other applications (apps) compatible with our system.

## 2020-05-11 NOTE — GOALS OF CARE CONVERSATION - ADVANCED CARE PLANNING - CONVERSATION/DISCUSSION
Hospice Referral
Prognosis/MOLST Discussed/Treatment Options/Diagnosis/Hospice Referral
MOLST Discussed

## 2020-05-11 NOTE — DISCHARGE NOTE NURSING/CASE MANAGEMENT/SOCIAL WORK - NSDCPEPTSTRK_GEN_ALL_CORE
Call 911 for stroke/Stroke support groups for patients, families, and friends/Prescribed medications/Risk factors for stroke/Stroke education booklet/Need for follow up after discharge/Stroke warning signs and symptoms/Signs and symptoms of stroke

## 2020-05-12 VITALS
RESPIRATION RATE: 18 BRPM | SYSTOLIC BLOOD PRESSURE: 108 MMHG | HEART RATE: 78 BPM | DIASTOLIC BLOOD PRESSURE: 63 MMHG | OXYGEN SATURATION: 94 % | TEMPERATURE: 98 F

## 2020-05-12 LAB
-  CANDIDA GLABRATA: SIGNIFICANT CHANGE UP
CULTURE RESULTS: SIGNIFICANT CHANGE UP
METHOD TYPE: SIGNIFICANT CHANGE UP
SPECIMEN SOURCE: SIGNIFICANT CHANGE UP

## 2020-05-12 NOTE — ED POST DISCHARGE NOTE - RESULT SUMMARY
pt +blood culture. pt admitted to dr matute at [facility]. comfort care only, being placed in hospice

## 2020-05-12 NOTE — ED CLERICAL - CLERICAL COMMENTS
Kaitlin from [facility] notified me the Pts Bld C&s + for yeast like cells in aerobic bottle.Pt was transferred to [place]. Notified Dr Lee of results. Pt is on hospice.

## 2020-05-16 LAB
-  5-FLUCYTOSINE: SIGNIFICANT CHANGE UP
-  AMPHOTERICIN B: SIGNIFICANT CHANGE UP
-  ANIDULAFUNGIN: SIGNIFICANT CHANGE UP
-  CASPOFUNGIN: SIGNIFICANT CHANGE UP
-  FLUCONAZOLE: SIGNIFICANT CHANGE UP
-  INTERPRETATION: SIGNIFICANT CHANGE UP
-  ITRACONAZOLE: SIGNIFICANT CHANGE UP
-  MICAFUNGIN: SIGNIFICANT CHANGE UP
-  POSACONAZOLE: SIGNIFICANT CHANGE UP
-  VORICONAZOLE: SIGNIFICANT CHANGE UP
CULTURE RESULTS: SIGNIFICANT CHANGE UP
METHOD TYPE: SIGNIFICANT CHANGE UP
ORGANISM # SPEC MICROSCOPIC CNT: SIGNIFICANT CHANGE UP
SPECIMEN SOURCE: SIGNIFICANT CHANGE UP

## 2020-05-28 PROCEDURE — 99285 EMERGENCY DEPT VISIT HI MDM: CPT | Mod: 25

## 2021-01-22 NOTE — PATIENT PROFILE ADULT. - FUNCTIONAL SCREEN CURRENT LEVEL: TOILETING, MLM
• This problem is chronic, likely secondary to significant myofascial restriction of the entire body likely from cumulative injuries/overuse over years. No red flag symptoms to suggest further immediate workup. There is an associated musculoskeletal component with somatic dysfunction specifically in the lumbar and thoracic spine most significantly.  • OMT was performed in order to improve ROM and decrease pain.  • The treatment was tolerated well without adverse events with subjective improvement in symptoms.  • Recommendations include stretches (handout given), consider yoga (guided online video given), adequate hydration and analgesia PRN. Discussed he can consider seeing Dr Bhat (chiro) here if he wishes and/or follow up with me in 4 weeks.    
(2) assistive person

## 2021-02-08 NOTE — ED ADULT NURSE NOTE - CAS EDN INTEG ASSESS
WDL Albendazole Counseling:  I discussed with the patient the risks of albendazole including but not limited to cytopenia, kidney damage, nausea/vomiting and severe allergy.  The patient understands that this medication is being used in an off-label manner.

## 2021-02-17 NOTE — PHYSICAL THERAPY INITIAL EVALUATION ADULT - LEVEL OF INDEPENDENCE: SIT/STAND, REHAB EVAL
[facility]   Progress Note  PCP: Coverage for Dr Cox      Subjective:  _  Patient still very nauseous, had NG inserted and vomited during my visit.    Objective:   Vital Signs (last 24 hrs)_____ Last Charted___________Minimum____________ Maximum____________  Temp    98.8  (APR 13 19:36) 98.1  (APR 13 08:31) 98.8  (APR 13 19:36)  Heart Rate   87  (APR 13 19:36) 87  (APR 13 19:36) H 105 (APR 13 08:31)  Resp Rate       16  (APR 13 19:36) 16  (APR 13 19:36) 18  (APR 13 08:31)  SBP    133  (APR 13 19:36) 125  (APR 13 08:31) 133  (APR 13 19:36)  DBP    64  (APR 13 19:36) 64  (APR 13 19:36) 70  (APR 13 08:31)    General:  Patient is alert and  uncomfortable  HEENT:  Pupils are equal and reactive, NG in, vomiting  RS:  Good air entry bilaterally, no crackles or wheezes heard, no conversational dyspnea  CVS:  S1, S2 regular, no murmurs or rubs heard.  no peripheral edema  GI:  Abdomen is soft and nontender, + distended, no hepatosplenomegaly noted  Musculoskeletal:  No overt joint deformities or effusions  Skin- no ulcers or rashes noted  CNS:  Speech appears normal, Motor examination- moves all extremities,  weak overall     Labs (Last four charted values)  WBC                  H 12.4 (APR 11) 6.5 (APR 06)   Hgb                  13.8 (APR 11) 12.4 (APR 06)   Hct                  42 (APR 11) 37 (APR 06)   Plt                  323 (APR 11) 262 (APR 06)   Na                   137 (APR 11) 138 (APR 06)   K                    4.7 (APR 11) 3.6 (APR 06)   CO2                  H 30 (APR 11) H 31 (APR 06)   Cl                   L 96 (APR 11) 100 (APR 06)   Cr                   0.82 (APR 11) 0.89 (APR 06)   BUN                  12 (APR 11) 12 (APR 06)   Glucose              H 141 (APR 11) H 115 (APR 06)   Mg                   2.3 (APR 11)   Ca                   9.5 (APR 11) 8.8 (APR 06)   PT                   H 11.9 (APR 11)   INR                  H 1.2 (APR 11)     Assessment/ Plan:    Obstructing Duodenal  mass-  Biospy pending, noted with liver mets on CT, liver biopsy done and results pending.  Will send out CA 19-9 and CEA to determine if this is pancreatic vs colon  Nausea and vomiting- most likely due to the obstruction.  Surgery consulted, NG placed.  Continue NPO,  Nutrition- anticipate possible TPN once central line in   Hypertension- Continue Hydralazine as needed   Osteoarthritis- multiple joints- Patient unable to tolerate po so will use IV Tylenol  GERD - IV PPI    Careplan- will wait for recommendations from surgery and biopsy results for further treatment planning.  Patient appears to have metastatic disease so anticipate involving oncology as well         Preston Hernandez M.D.  Tel:  [phone]       independent

## 2021-08-16 NOTE — ED ADULT NURSE NOTE - NSHISCREENINGQ1_ED_A_ED
Verner Monas is a 46year old female Acupuncture Therapy. Complaints:  1. Neck and shoulder tightness and pain  2. Stress  3. Bilateral thumb pain     Response to last TX: Neck pain improved by 50% and is feeling much better.   Her bilateral thumb pain
No

## 2022-01-12 NOTE — PHYSICAL THERAPY INITIAL EVALUATION ADULT - SKIN WDL, MLM
WDL except Detail Level: Detailed Depth Of Biopsy: dermis Was A Bandage Applied: Yes Size Of Lesion In Cm: 0.4 X Size Of Lesion In Cm: 0 Biopsy Type: H and E Biopsy Method: Dermablade Anesthesia Type: 1% lidocaine with epinephrine Anesthesia Volume In Cc (Will Not Render If 0): 0.5 Hemostasis: Aluminum Chloride Wound Care: Aquaphor Dressing: bandage Destruction After The Procedure: No Type Of Destruction Used: Curettage Curettage Text: The wound bed was treated with curettage after the biopsy was performed. Cryotherapy Text: The wound bed was treated with cryotherapy after the biopsy was performed. Electrodesiccation Text: The wound bed was treated with electrodesiccation after the biopsy was performed. Electrodesiccation And Curettage Text: The wound bed was treated with electrodesiccation and curettage after the biopsy was performed. Silver Nitrate Text: The wound bed was treated with silver nitrate after the biopsy was performed. Lab: 6 Path Notes (To The Dermatopathologist): R/O DN Consent: Written consent was obtained and risks were reviewed including but not limited to scarring, infection, bleeding, scabbing, incomplete removal, nerve damage and allergy to anesthesia. Post-Care Instructions: I reviewed with the patient in detail post-care instructions. Patient is to keep the biopsy site dry overnight, and then apply bacitracin twice daily until healed. Patient may apply hydrogen peroxide soaks to remove any crusting. Notification Instructions: Patient will be notified of biopsy results. However, patient instructed to call the office if not contacted within 2 weeks. Billing Type: Third-Party Bill Information: Selecting Yes will display possible errors in your note based on the variables you have selected. This validation is only offered as a suggestion for you. PLEASE NOTE THAT THE VALIDATION TEXT WILL BE REMOVED WHEN YOU FINALIZE YOUR NOTE. IF YOU WANT TO FAX A PRELIMINARY NOTE YOU WILL NEED TO TOGGLE THIS TO 'NO' IF YOU DO NOT WANT IT IN YOUR FAXED NOTE.

## 2022-01-19 NOTE — H&P ADULT. - GASTROINTESTINAL DETAILS
nontender Calcipotriene Pregnancy And Lactation Text: This medication has not been proven safe during pregnancy. It is unknown if this medication is excreted in breast milk.

## 2022-04-05 NOTE — PHYSICAL THERAPY INITIAL EVALUATION ADULT - GAIT TRAINING, PT EVAL
no Patient will ambulate x 25 feet with RW with min A x 1 in 1 week in order to maintain mobility on unit at home

## 2022-05-31 NOTE — H&P ADULT - NSICDXPASTMEDICALHX_GEN_ALL_CORE_FT
Patient transported to [id number] Mary Judd RN  05/31/22 1678 PAST MEDICAL HISTORY:  Agitation     Anxiety     CAD (coronary artery disease)     Cataract right eye    Chronic back pain     DVT (deep venous thrombosis), right     Essential hypertension     GERD (gastroesophageal reflux disease)     Hyperlipidemia, unspecified hyperlipidemia type     Hypothyroid     Neck pain     Retention of urine

## 2022-06-03 NOTE — ED PROVIDER NOTE - CPE EDP [facility] NORM
[facility] Hospitalists Progress Note       Date Of Service: 06/03/2022    Reason for F/U: ETOH withdrawal     Subjective   Patient was seen and examined today.  Patient is generally doing good no new issues overnight.  Denies any chest pain or shortness of breath.  Denies any fever denies any chills no abdominal pain nausea or vomiting.    ROS:   As per subjective otherwise negative.      Assessment and Plan   Today's Update and Plan of care changes:   Patient this morning was seen walking in the hallway comfortable on room air.  VQ scan negative for PE, venous Doppler negative for DVT, chest x-ray with no acute findings.  Patient currently off oxygen.  Patient still interested in going to inpatient rehab unfortunately we are not can to be able to send him today so we will be waiting until Monday for the transfer.  Continue with the CIWA protocol and monitoring.  Adjust insulin to achieve better control of his diabetes.  Patient will be transferring to the detox unit.  Repeat labs in a.m.    Admission plan of care:  # Alcohol use d/o w/ h/o complicated withdrawal including DTs:  # Acute alcohol withdrawal:  -- chemical dependency service consult  -- counseled on cessation of EtOH intake  -- quantify withdrawal symptoms w/ CIWA scoring  -- PRN IV Ativan for elevated CIWA scores  -- daily thiamine, folic acid     # Polysubstance abuse including cocaine use, marijuana use:  -- chemical dependency service consult  -- counseled on cessation     # Transaminitis due to alcohol abuse:  -- no further evaluation planned     # IDDM w/ hyperglycemia:  -- check HgbA1c  -- home regimen: Lantus 44 units nightly, glipizide 10mg BID, pioglitazone 15mg daily  -- holding home glipizide, pioglitazone  -- use basal/bolus regimen in hospital: Lantus 30 units nightly, Admelog 8 units TIDAC w/ add'l SSI     # HTN:  -- continue home metoprolol     # Anxiety; depression:  -- continue home Seroquel     # GERD:  -- continue home PPI     # Obesity:  BMI 36.9.  --  on lifestyle modifications w/ goal of weight loss     FEN: diabetic diet, no IVF  DVT PPX: SC Lovenox  DISPO: inpatient level of care     Objective     Visit Vitals  BP (!) 136/92   Pulse (!) 110   Temp 97.9 °F (36.6 °C) (Oral)   Resp 18   Ht 5' 11\" (1.803 m)   Wt 116.1 kg (255 lb 15.3 oz)   SpO2 95%   BMI 35.70 kg/m²      Exam:  Patient is alert, oriented in no apparent distress  Head and neck:atraumatic conjunctivae clear.  Neck supple.  Lungs: Normal respiratory efforts.  There is no wheezing or crackles.   Heart: RRR no M.   Abd: S/NT/ND  Skin: No rash, no Jaundice  Neuro: intact, no focal deficits.  Moving all extremities.  Ext: no swelling or Calf tenderness  Psych: Appropriate mode and affect.     Current Medications:  • buprenorphine-naLOXone  1 each Sublingual BID   • insulin glargine  44 Units Subcutaneous Nightly   • insulin lispro  10 Units Subcutaneous TID WC   • sodium chloride (PF)  2 mL Intracatheter 2 times per day   • enoxaparin  40 mg Subcutaneous Daily   • Magnesium Standard Replacement Protocol   Does not apply See Admin Instructions   • Phosphorus Standard Replacement Protocol   Does not apply See Admin Instructions   • gabapentin  300 mg Oral 3 times per day   • metoPROLOL tartrate  25 mg Oral BID   • pantoprazole  40 mg Oral Daily   • QUEtiapine  100 mg Oral Nightly   • insulin lispro   Subcutaneous TID WC   • insulin lispro   Subcutaneous Nightly   • naLOXone  1 each Nasal Once       Continuous Infusions:      PRN Meds:.  • naLOXone (NARCAN) injection 0.4 mg  0.4 mg Intravenous PRN For total dose see MAR   • sodium chloride 0.9 % flush bag 25 mL  25 mL Intravenous PRN For total dose see MAR   • sodium chloride (NORMAL SALINE) 0.9 % bolus 500 mL  500 mL Intravenous PRN For total dose see MAR   • sodium chloride 0.9 % flush bag 25 mL  25 mL Intravenous PRN For total dose see MAR   • acetaminophen (TYLENOL) tablet 650 mg  650 mg Oral Q4H PRN For total dose see MAR   •  LORazepam (ATIVAN) injection 2 mg  2 mg Intravenous Q1H PRN For total dose see MAR    Or   • LORazepam (ATIVAN) injection 3 mg  3 mg Intravenous Q1H PRN For total dose see MAR    Or   • LORazepam (ATIVAN) injection 4 mg  4 mg Intravenous Q1H PRN For total dose see MAR   • dextrose 50 % injection 25 g  25 g Intravenous PRN For total dose see MAR   • dextrose 50 % injection 12.5 g  12.5 g Intravenous PRN For total dose see MAR   • glucagon (GLUCAGEN) injection 1 mg  1 mg Intramuscular PRN For total dose see MAR   • dextrose (GLUTOSE) 40 % gel 15 g  15 g Oral PRN For total dose see MAR   • dextrose (GLUTOSE) 40 % gel 30 g  30 g Oral PRN For total dose see MAR   • ondansetron (ZOFRAN) injection 4 mg  4 mg Intravenous Q6H PRN For total dose see MAR   • polyethylene glycol (MIRALAX) packet 17 g  17 g Oral Daily PRN For total dose see MAR   • cloNIDine (CATAPRES) tablet 0.1 mg  0.1 mg Oral Q8H PRN For total dose see MAR   • dicyclomine (BENTYL) capsule 10 mg  10 mg Oral Q6H PRN For total dose see MAR   • cyclobenzaprine (FLEXERIL) tablet 10 mg  10 mg Oral TID PRN For total dose see MAR          Labs   Recent Labs   Lab 06/02/22  0503 06/01/22  0501 05/31/22  0939 05/30/22  0510 05/29/22  0517 05/28/22  2034   SODIUM 138  --  138 141   < > 140   POTASSIUM 3.8  --  3.8 3.8   < > 3.7   CHLORIDE 106  --  105 108   < > 105   CO2 24  --  27 26   < > 23   BUN 13  --  9 10   < > 9   CREATININE 0.68  --  0.64* 0.66*   < > 0.71   GLUCOSE 227*  --  291* 222*   < > 249*   CALCIUM 8.6  --  8.7 8.6   < > 9.1   ALBUMIN  --   --   --  3.2*  --  3.4*   AST  --   --   --  38*  --  62*   MG 1.8 1.9 1.7 1.7   < >  --     < > = values in this interval not displayed.     Recent Labs   Lab 06/02/22  0503 05/31/22  0939 05/30/22  0510   WBC 6.8 6.1 5.9   HGB 15.5 15.1 15.0   HCT 46.0 44.1 45.7    184 177     No results found     Recent Labs   Lab 06/02/22  2044 06/03/22  0724 06/03/22  0726 06/03/22  1158   GLUCOSE BEDSIDE 326* 314* 322*  216*        Cultures  Microbiology Results     None               Patient Active Problem List   Diagnosis   • Hx of cholecystectomy   • Diabetes mellitus (CMS/HCC)   • Alcohol withdrawal (CMS/HCC)   • Alcohol intoxication (CMS/HCC)   • Hypertension   • Obesity (BMI 30.0-34.9)   • Alcohol abuse   • Alcohol use disorder, severe, dependence (CMS/HCC)   • Hyponatremia   • Anxiety and depression   • Alcohol use disorder, moderate, dependence (CMS/HCC)       Code Status    Code Status: Full Resuscitation    Consults:       I spent greater than 35 minutes coordinating care, reviewing patient's diagnostic studies, examining patient, reviewing pertinent notes, collaborating with RNs/physicians/APNs/PAs involved in patient's care, determining management plan, and discussing plan with patient at bedside.     Joyce Ascencio MD  [facility]    This note was created using voice recognition technology. It may include inadvertent transcriptional errors. Any such errors should be contextually interpreted and should not be taken to alter the content or the meaning.   Note to Patient: The 21st Century Cures Act makes medical notes like these available to patients in the interest of transparency. However, be advised this is a medical document. It is intended as peer to peer communication. It is written in medical language and may contain abbreviations or verbiage that are unfamiliar. It may appear blunt or direct. Medical documents are intended to carry relevant information, facts as evident, and the clinical opinion of the practitioner.    normal...

## 2022-08-19 NOTE — DISCHARGE NOTE NURSING/CASE MANAGEMENT/SOCIAL WORK - PATIENT PORTAL LINK FT
VASCULAR SURGERY  Patient continues to progress well.  Reviewed Dr. Aguilera's notes and recs regarding cardiac risk assessment. Hgb slowly rising.  Patient wishes to proceed with BKA.  Scheduled for Tuesday.  Preop labs including updated COVID ordered.  Thank you.   You can access the FollowMyHealth Patient Portal offered by [facility] by registering at the following website: [email or web address]. By joining [employer]’s FollowMyHealth portal, you will also be able to view your health information using other applications (apps) compatible with our system.

## 2022-10-17 NOTE — H&P ADULT - NSHPSOCIALHISTORY_GEN_ALL_CORE
Social hx:  lives with family Griseofulvin Counseling:  I discussed with the patient the risks of griseofulvin including but not limited to photosensitivity, cytopenia, liver damage, nausea/vomiting and severe allergy.  The patient understands that this medication is best absorbed when taken with a fatty meal (e.g., ice cream or french fries).

## 2022-10-28 NOTE — ED ADULT NURSE NOTE - NS ED PATIENT SAFETY CONCERN
Patient's NP/ruddy appointment was bumped to 11/28 from 11/16.  Last night he found a lump on his chest and is concerned that he needs to be seen sooner about this.    Please advise if patient's appointment can be moved sooner to establish care and discuss the new lump in his chest.   Unable to assess due to medical condition

## 2023-01-13 NOTE — ED PROVIDER NOTE - NSUNITLOCATION_ED_A_ED
NOTIFICATION RETURN TO WORK / SCHOOL    1/15/2023    Mr. Nga Perry  [id number] [facility] [id number]      To Whom It May Concern:    Nga Perry was hospitalized from January 13 and released on January 15, 2023. He may return to work on Tuesday January 17, 2023. If there are questions or concerns, please have the patient contact our office.         Sincerely,      _____________________________  RONNIE Phoenxi   Medical Social Worker
ED

## 2023-08-24 NOTE — PHYSICAL THERAPY INITIAL EVALUATION ADULT - ASSISTIVE DEVICE:SIT/SUPINE, REHAB EVAL
Pt is asking if the [facility] papers are ready for her to sign and fill out her portion? Pt would like to come in on 8-25-23 about noon to get that done. Please call to confirm she can do that. bed rails

## 2023-09-08 NOTE — H&P ADULT - PROBLEM SELECTOR PLAN 4
Received provider signed Settlement Capacity Statement form and faxed to [employer], [phone], right fax confirmed at 6:07 am today, 9/8/2023.  Copy to  and abstracting.  Called and spoke to the patient and let her know that the form was signed and faxed. Patient would like a copy mailed to her house. I confirmed her address, this will go out in tomorrow's mail. Patient understands.  Tania Ramirez MA  [facility]   Primary Care     -con't synthroid

## 2024-03-25 NOTE — PATIENT PROFILE ADULT - NSPROEDAREADYLEARNOTH_GEN_A_NUR
Detail Level: Detailed
Quality 226: Preventive Care And Screening: Tobacco Use: Screening And Cessation Intervention: Patient screened for tobacco use and is an ex/non-smoker
none

## 2024-05-16 NOTE — ED PROVIDER NOTE - NS ED MD DISPO ADMITTING SERVICE
Please Approve or Refuse.  Send to Pharmacy per Pt's Request:      Next Visit Date:  7/15/2024   Last Visit Date: 4/1/2024    Hemoglobin A1C (%)   Date Value   04/01/2024 9.6             ( goal A1C is < 7)   BP Readings from Last 3 Encounters:   04/01/24 138/80          (goal 120/80)  BUN   Date Value Ref Range Status   04/03/2024 19 8 - 23 mg/dL Final     Creatinine   Date Value Ref Range Status   04/03/2024 0.7 0.5 - 0.9 mg/dL Final     Potassium   Date Value Ref Range Status   04/03/2024 4.2 3.7 - 5.3 mmol/L Final           
MED

## 2024-05-23 NOTE — ED PROVIDER NOTE - NS ED MD DISPO DISCHARGE CCDA
Stop insulin.   Stay on glipizide 10 mg twice daily.   If you still have low glucose levels < 100 often or have to snack to keep sugars up, decrease the glipizide to 5 mg twice daily. If still dropping, stop glipizide.   Increase ozempic to 2 mg weekly.   Continue jardiance.     Call the office/message if glucose levels are > 200 often or < 100 often.   
Patient/Caregiver provided printed discharge information.

## 2024-06-25 NOTE — DIETITIAN INITIAL EVALUATION ADULT. - PROBLEM SELECTOR PLAN 1
PLAN  Requesting PT order to eval/treat neck/L knee pain since current order only includes chronic lumbar pain.    Beginning/End Dates of Progress Note Reporting Period:    to 06/25/2024    Referring Provider:  Dorinda Vazquez    
panculture and empiric abx

## 2025-01-07 NOTE — H&P ADULT - NSHPPOADEEPVENOUSTHROMB_GEN_A_CORE
Encounters:   01/07/25 (!) 164.7 kg (363 lb)   12/03/24 (!) 169.6 kg (374 lb)   10/28/24 (!) 168.3 kg (371 lb)     Physical Exam:  Resp 18   Ht 1.702 m (5' 7\")   Wt (!) 164.7 kg (363 lb)   BMI 56.85 kg/m²   Wt Readings from Last 3 Encounters:   01/07/25 (!) 164.7 kg (363 lb)   12/03/24 (!) 169.6 kg (374 lb)   10/28/24 (!) 168.3 kg (371 lb)     Physical Exam  Constitutional:       General: She is not in acute distress.     Appearance: She is well-developed. She is obese.   HENT:      Head: Normocephalic and atraumatic.   Neck:      Vascular: No carotid bruit or JVD.   Cardiovascular:      Rate and Rhythm: Normal rate and regular rhythm.      Heart sounds: No murmur heard.     No friction rub. No gallop.      Comments: 1/6 systolic murmur best heard at the left sternal border.  Pulmonary:      Breath sounds: No wheezing or rales.      Comments: Decreased air entry bilaterally with no wheezing or crackles.  Chest:      Chest wall: No tenderness.   Abdominal:      General: Bowel sounds are normal. There is no distension.      Palpations: Abdomen is soft. There is no mass.      Tenderness: There is no abdominal tenderness.   Musculoskeletal:      Cervical back: Neck supple.      Right lower leg: Edema present.      Left lower leg: Edema present.      Comments: Obese legs with 1+ edema.   Skin:     General: Skin is warm and dry.   Neurological:      Mental Status: She is alert and oriented to person, place, and time.          Laboratory Tests:  Lab Results   Component Value Date    CREATININE 0.7 12/03/2024    BUN 13 12/03/2024     12/03/2024    K 3.8 12/03/2024     (H) 12/03/2024    CO2 25 12/03/2024     No results found for: \"MG\"  Lab Results   Component Value Date    WBC 8.6 10/28/2024    HGB 11.0 (L) 10/28/2024    HCT 37.4 10/28/2024    MCV 81.8 10/28/2024     10/28/2024     Lab Results   Component Value Date    ALT 16 12/03/2024    AST 17 12/03/2024    ALKPHOS 78 12/03/2024    BILITOT 0.7 
no

## 2025-05-13 NOTE — ED ADULT NURSE NOTE - NS ED NURSE LEVEL OF CONSCIOUSNESS ORIENTATION
Disoriented Detail Level: Generalized General Sunscreen Counseling: I recommended a broad spectrum sunscreen with a SPF of 30 or higher.  I explained that SPF 30 sunscreens block approximately 97 percent of the sun's harmful rays.  Sunscreens should be applied at least 15 minutes prior to expected sun exposure and then every 2 hours after that as long as sun exposure continues. If swimming or exercising sunscreen should be reapplied every 45 minutes to an hour after getting wet or sweating.  One ounce, or the equivalent of a shot glass full of sunscreen, is adequate to protect the skin not covered by a bathing suit. I also recommended a lip balm with a sunscreen as well. Sun protective clothing, sunglasses, and a broad brimmed hat can be used in lieu of sunscreen but must be worn the entire time you are exposed to the sun's rays.

## 2025-06-10 NOTE — DISCHARGE NOTE PROVIDER - PROVIDER RX CONTACT NUMBER
[facility] NUTRITION SUPPORT-- FOLLOW UP NOTE      24 hour events/subjective:   Patient seen and examined at bedside, chart reviewed and events noted and I's and O's reviewed.  Patient denies chest pain, shortness of breath, nausea, vomiting, dizziness, chills at time of visit.  Patient tolerating FLD.  Patient's VSS and no other acute overnight events noted.   Patient continues on 1/2 TPN and glucose trend is within appropriate range; diet advanced to regular and patient tolerating enteral intake.       ROS:  Except as noted above, all other systems reviewed and are negative           Diet:  Diet, Regular:   Consistent Carbohydrate No Snacks (CSTCHO) (06-10-25 @ 09:15)      PAST HISTORY  --------------------------------------------------------------------------------  PAST MEDICAL & SURGICAL HISTORY:  Glaucoma      HTN (hypertension)      Type II diabetes mellitus      Multiple thyroid nodules      Hyperthyroidism      Obese      Ovarian cyst      Gastroparesis      Iron deficiency anemia      H/O: hysterectomy  "a long time ago when I was in my 30's"      H/O       History of thyroidectomy      History of breast surgery        No significant changes to PMH, PSH, FHx, SHx, unless otherwise noted    ALLERGIES & MEDICATIONS  --------------------------------------------------------------------------------  Allergies    No Known Allergies    Intolerances      Standing Inpatient Medications  acetaminophen     Tablet .. 975 milliGRAM(s) Oral every 6 hours  amLODIPine   Tablet 10 milliGRAM(s) Oral daily  chlorhexidine 2% Cloths 1 Application(s) Topical <User Schedule>  dextrose 5%. 1000 milliLiter(s) IV Continuous <Continuous>  dextrose 5%. 1000 milliLiter(s) IV Continuous <Continuous>  dextrose 50% Injectable 25 Gram(s) IV Push once  dextrose 50% Injectable 12.5 Gram(s) IV Push once  dextrose 50% Injectable 25 Gram(s) IV Push once  dextrose 50% Injectable 25 Gram(s) IV Push once  glucagon  Injectable 1 milliGRAM(s) IntraMuscular once  heparin   Injectable 5000 Unit(s) SubCutaneous every 8 hours  insulin lispro (ADMELOG) corrective regimen sliding scale   SubCutaneous three times a day before meals  levothyroxine Injectable 75 MICROGram(s) IV Push at bedtime  pantoprazole  Injectable 40 milliGRAM(s) IV Push two times a day  Parenteral Nutrition - Adult 1 Each TPN Continuous <Continuous>    PRN Inpatient Medications        VITALS/PHYSICAL EXAM  --------------------------------------------------------------------------------  T(C): 36.3 (06-10-25 @ 09:45), Max: 37 (06-10-25 @ 05:03)  HR: 80 (06-10-25 @ 09:45) (68 - 80)  BP: 124/74 (06-10-25 @ 09:45) (110/66 - 153/83)  RR: 18 (06-10-25 @ 09:45) (18 - 18)  SpO2: 100% (06-10-25 @ 09:45) (99% - 100%)  Wt(kg): --      25 @ 07:01  -  06-10-25 @ 07:00  --------------------------------------------------------  IN: 922.4 mL / OUT: 2250 mL / NET: -1327.6 mL    06-10-25 @ 07:01  -  06-10-25 @ 12:30  --------------------------------------------------------  IN: 300 mL / OUT: 250 mL / NET: 50 mL      I&O's Detail    2025 07:  -  10 Omar 2025 07:00  --------------------------------------------------------  IN:    Fat Emulsion (Fish Oil &amp; Plant Based) 20% Infusion: 10.4 mL    IV PiggyBack: 100 mL    Oral Fluid: 480 mL    TPN (Total Parenteral Nutrition): 332 mL  Total IN: 922.4 mL    OUT:    Voided (mL): 2250 mL  Total OUT: 2250 mL    Total NET: -1327.6 mL      10 Omar 2025 07:01  -  10 Omar 2025 12:30  --------------------------------------------------------  IN:    Oral Fluid: 300 mL  Total IN: 300 mL    OUT:    Voided (mL): 250 mL  Total OUT: 250 mL    Total NET: 50 mL          Physical Exam:  Gen: NAD, well-appearing  Neck: trachea midline no noted JVD  Chest: non labored breathing equal chest expansion b/l  Abd: soft, nontender/nondistended  UE: WWP no c/c/e PICC dressing CDI  LE: WWP no c/c/e  Neuro: AOx3  Psych: Normal affect and mood  Skin: Warm, without rashes      LABS/STUDIES  --------------------------------------------------------------------------------              8.2    7.25  >-----------<  409      [06-10-25 @ 06:52]              24.7     137  |  103  |  30  ----------------------------<  102      [06-10-25 @ 06:52]  4.4   |  22  |  0.87        Ca     9.1     [06-10-25 @ 06:52]      iCa    1.29     [06-10 @ 06:51]      Mg     2.1     [06-10-25 @ 06:52]      Phos  4.2     [06-10-25 @ 06:52]    TPro  5.9  /  Alb  3.2  /  TBili  0.2  /  DBili  x   /  AST  17  /  ALT  10  /  AlkPhos  48  [25 @ 07:44]          Ca ionized  Creatinine Trend:  POC glucoseGlucose: 102 mg/dL (06-10-25 @ 06:52)  CAPILLARY BLOOD GLUCOSE      POCT Blood Glucose.: 103 mg/dL (10 Omar 2025 12:19)  POCT Blood Glucose.: 159 mg/dL (10 Omar 2025 07:47)  POCT Blood Glucose.: 122 mg/dL (2025 21:22)  POCT Blood Glucose.: 95 mg/dL (2025 16:21)    PrealbuminPrealbumin, Serum: 27 mg/dL (06-10-25 @ 06:52)  Prealbumin, Serum: 19 mg/dL (25 @ 07:10)  Prealbumin, Serum: 19 mg/dL (25 @ 02:27)  Prealbumin, Serum: 17 mg/dL (25 @ 03:53)  Prealbumin, Serum: 18 mg/dL (25 @ 06:56)    Triglycerides     [phone]